# Patient Record
Sex: FEMALE | Race: WHITE | NOT HISPANIC OR LATINO | Employment: UNEMPLOYED | URBAN - METROPOLITAN AREA
[De-identification: names, ages, dates, MRNs, and addresses within clinical notes are randomized per-mention and may not be internally consistent; named-entity substitution may affect disease eponyms.]

---

## 2017-06-14 ENCOUNTER — OFFICE VISIT (OUTPATIENT)
Dept: FAMILY MEDICINE | Facility: CLINIC | Age: 52
End: 2017-06-14

## 2017-06-14 VITALS
DIASTOLIC BLOOD PRESSURE: 102 MMHG | HEART RATE: 75 BPM | BODY MASS INDEX: 37.5 KG/M2 | SYSTOLIC BLOOD PRESSURE: 146 MMHG | WEIGHT: 210 LBS | OXYGEN SATURATION: 97 %

## 2017-06-14 DIAGNOSIS — I10 BENIGN ESSENTIAL HYPERTENSION: ICD-10-CM

## 2017-06-14 DIAGNOSIS — F41.1 GAD (GENERALIZED ANXIETY DISORDER): ICD-10-CM

## 2017-06-14 DIAGNOSIS — F33.1 MODERATE EPISODE OF RECURRENT MAJOR DEPRESSIVE DISORDER (H): Primary | ICD-10-CM

## 2017-06-14 PROCEDURE — 99214 OFFICE O/P EST MOD 30 MIN: CPT | Performed by: FAMILY MEDICINE

## 2017-06-14 RX ORDER — VENLAFAXINE HYDROCHLORIDE 75 MG/1
75 CAPSULE, EXTENDED RELEASE ORAL DAILY
Qty: 30 CAPSULE | Refills: 1 | Status: SHIPPED | OUTPATIENT
Start: 2017-06-14 | End: 2017-08-10

## 2017-06-14 RX ORDER — LISINOPRIL 5 MG/1
5 TABLET ORAL DAILY
Qty: 30 TABLET | Refills: 1 | Status: SHIPPED | OUTPATIENT
Start: 2017-06-14 | End: 2017-06-30

## 2017-06-14 NOTE — PATIENT INSTRUCTIONS
After being on Venlafaxine for 1-2 weeks, then you may add:  Magnesium 400 mg daily  B complex vitamin daily  5- mg daily

## 2017-06-14 NOTE — MR AVS SNAPSHOT
"              After Visit Summary   6/14/2017    Salina Khan    MRN: [id number]           Patient Information     Date Of Birth          1965        Visit Information        Provider Department      6/14/2017 4:00 PM Melody Barnard MD [facility]        Today's Diagnoses     Moderate episode of recurrent major depressive disorder (H)    -  1    JASON (generalized anxiety disorder)        Benign essential hypertension          Care Instructions    After being on Venlafaxine for 1-2 weeks, then you may add:  Magnesium 400 mg daily  B complex vitamin daily  5- mg daily          Follow-ups after your visit        Your next 10 appointments already scheduled     Jun 30, 2017  8:30 AM CDT   PHYSICAL with Melody Barnard MD   [facility]    [address] [place] [address] [phone]              Who to contact     If you have questions or need follow up information about today's clinic visit or your schedule please contact [facility] directly at [phone].  Normal or non-critical lab and imaging results will be communicated to you by MobiKwikhart, letter or phone within 4 business days after the clinic has received the results. If you do not hear from us within 7 days, please contact the clinic through Lumenpulset or phone. If you have a critical or abnormal lab result, we will notify you by phone as soon as possible.  Submit refill requests through PEVESA or call your pharmacy and they will forward the refill request to us. Please allow 3 business days for your refill to be completed.          Additional Information About Your Visit        MobiKwikhart Information     PEVESA lets you send messages to your doctor, view your test results, renew your prescriptions, schedule appointments and more. To sign up, go to [email or web address] . Click on \"Log in\" on the left side of the screen, which will take you to the Welcome " "page. Then click on \"Sign up Now\" on the right side of the page.     You will be asked to enter the access code listed below, as well as some personal information. Please follow the directions to create your username and password.     Your access code is: [email or web address]  Expires: 2017  4:43 PM     Your access code will  in 90 days. If you need help or a new code, please call your [facility] clinic or [phone].        Care EveryWhere ID     This is your Care EveryWhere ID. This could be used by other organizations to access your [facility] medical records  [id number]        Your Vitals Were     Pulse Pulse Oximetry BMI (Body Mass Index)             75 97% 37.5 kg/m2          Blood Pressure from Last 3 Encounters:   17 (!) 140/115   10/27/15 122/82   06/03/15 121/86    Weight from Last 3 Encounters:   17 95.3 kg (210 lb)   10/27/15 87.4 kg (192 lb 9.6 oz)   06/03/15 88.5 kg (195 lb)              Today, you had the following     No orders found for display         Today's Medication Changes          These changes are accurate as of: 17  4:43 PM.  If you have any questions, ask your nurse or doctor.               Start taking these medicines.        Dose/Directions    lisinopril 5 MG tablet   Commonly known as:  PRINIVIL/ZESTRIL   Used for:  Benign essential hypertension   Started by:  Melody Barnard MD        Dose:  5 mg   Take 1 tablet (5 mg) by mouth daily   Quantity:  30 tablet   Refills:  1       venlafaxine 75 MG 24 hr capsule   Commonly known as:  EFFEXOR-XR   Used for:  Moderate episode of recurrent major depressive disorder (H), JASON (generalized anxiety disorder)   Started by:  Melody Barnard MD        Dose:  75 mg   Take 1 capsule (75 mg) by mouth daily   Quantity:  30 capsule   Refills:  1            Where to get your medicines      These medications were sent to [facility] - [place], [place] - [address]  [address] [place] [address]     Phone:  " [phone]     lisinopril 5 MG tablet    venlafaxine 75 MG 24 hr capsule                Primary Care Provider Office Phone # Fax #    Melody Barnard -[phone]       [facility] [address]  [facility] [place][note 2] [address]        Thank you!     Thank you for choosing [facility]  for your care. Our goal is always to provide you with excellent care. Hearing back from our patients is one way we can continue to improve our services. Please take a few minutes to complete the written survey that you may receive in the mail after your visit with us. Thank you!             Your Updated Medication List - Protect others around you: Learn how to safely use, store and throw away your medicines at www.disposemymeds.org.          This list is accurate as of: 6/14/17  4:43 PM.  Always use your most recent med list.                   Brand Name Dispense Instructions for use    calcium citrate 950 MG tablet    CALCITRATE     Take 1 tablet by mouth 3 times daily       cyanocobalamin 1000 MCG tablet    vitamin  B-12     Take 1,000 mcg by mouth daily       IRON SUPPLEMENT PO      Take by mouth daily       lisinopril 5 MG tablet    PRINIVIL/ZESTRIL    30 tablet    Take 1 tablet (5 mg) by mouth daily       MULTI-VITAMIN PO      Take 1 tablet by mouth       valACYclovir 1000 mg tablet    VALTREX    20 tablet    Take 1 tablet (1,000 mg) by mouth 2 times daily       venlafaxine 75 MG 24 hr capsule    EFFEXOR-XR    30 capsule    Take 1 capsule (75 mg) by mouth daily       vitamin D 2000 UNITS Caps      Take by mouth 2 times daily

## 2017-06-14 NOTE — PROGRESS NOTES
Problem(s) Oriented visit        SUBJECTIVE:                                                    Salina Khan is a 51 year old female who presents to clinic today for depression. She has taken Wellbutrin in the past. Right now she feels both depressed and anxious. She has occasional panic attacks. She is working full time as a . Her  recently quit his job and has not yet found another.They are going to marriage counseling, and she is calling to make an appointment with a new individual counselor. Her marriage counselor recommended that she be seen for anxiety and depression. She is not suicidal but feels as though if she were dead the stress would finally be gone. She has no plan for harming herself.      Problem list, Medication list, Allergies, and Medical/Social/Surgical histories reviewed in EPIC and updated as appropriate.   Additional history: as documented    ROS:  5 point ROS completed and negative except noted above, including Gen, CV, Resp, GI, MS  See PHQ and Burns forms for depression and anxiety related symptoms.    Histories:   Patient Active Problem List   Diagnosis     Gestational diabetes     H/O: depression     Health Care Home     Past Surgical History:   Procedure Laterality Date     C/SECTION, CLASSICAL      , Classical     TUBAL LIGATION         Social History   Substance Use Topics     Smoking status: Never Smoker     Smokeless tobacco: Never Used     Alcohol use 3.0 oz/week     5 Standard drinks or equivalent per week     Family History   Problem Relation Age of Onset     Congenital Anomalies Father      cystic fibrosis  age 70's     Hypertension Mother      DIABETES Father            OBJECTIVE:                                                    BP (!) 146/102  Pulse 75  Wt 95.3 kg (210 lb)  SpO2 97%  BMI 37.5 kg/m2  Body mass index is 37.5 kg/(m^2).   GENERAL APPEARANCE: Alert, no acute distress  NEURO: Alert, oriented, speech and mentation normal  PSYCH:  mentation appears normal, affect and mood normal   Labs Resulted Today:   Results for orders placed or performed in visit on 10/27/15   Pap IG HPV hr and lr ([facility])   Result Value Ref Range    DIAGNOSIS: Comment     Specimen adequacy: Comment     Clinician Provided ICD10 Comment     Performed by: Comment     QC reviewed by: Comment     . .     Note: Comment     Test Methodology: Comment     HPV High Risk Negative Negative    HPV, low-risk Negative Negative    Narrative    [facility]formed at:  [facility] - [facility]  [address] [place], [place]  [id number]  : Shama Scott MD, Phone:  [phone]  Performed at:  02 - [facility] [address] [place], [place]  [id number]  : Shama Scott MD, Phone:  [phone]  Specimen Comment: Source.............Cervical  Specimen Comment: LMP / Prev Treat...[id number];None  Specimen Comment: No. of containers..01 CYTYC Thin Prep Vial   Comp. Metabolic Panel (14) ([facility])   Result Value Ref Range    Glucose 92 65 - 99 mg/dL    Urea Nitrogen 13 6 - 24 mg/dL    Creatinine 0.65 0.57 - 1.00 mg/dL    eGFR If NonAfricn Am 103 >59 mL/min/1.73    eGFR If Africn Am 119 >59 mL/min/1.73    BUN/Creatinine Ratio 20 9 - 23    Sodium 140 134 - 144 mmol/L    Potassium 4.5 3.5 - 5.2 mmol/L    Chloride 98 97 - 108 mmol/L    Total CO2 28 18 - 28 mmol/L    Calcium 8.6 (L) 8.7 - 10.2 mg/dL    Protein Total 6.3 6.0 - 8.5 g/dL    Albumin 3.7 3.5 - 5.5 g/dL    Globulin, Total 2.6 1.5 - 4.5 g/dL    A/G Ratio 1.4 1.1 - 2.5    Bilirubin Total 0.4 0.0 - 1.2 mg/dL    Alkaline Phosphatase 89 39 - 117 IU/L    AST 18 0 - 40 IU/L    ALT 16 0 - 32 IU/L    Narrative    Performed at:  01 - [facility] [address], [place], [place]  [id number]  : Tomas Edmonds MD, Phone:  [phone]   Lipid Panel ([facility])   Result Value Ref Range    Cholesterol 149 100 - 199 mg/dL    Triglycerides 91 0 - 149 mg/dL    HDL Cholesterol 70 >39 mg/dL    VLDL  Cholesterol Drew 18 5 - 40 mg/dL    LDL Cholesterol Calculated 61 0 - 99 mg/dL    LDL/HDL Ratio 0.9 0.0 - 3.2 ratio units    Narrative    Performed at:  01 - [facility] [address], [place], [place]  [id number]  : Tomas Edmonds MD, Phone:  [phone]   CBC with Diff/Plt ([facility])   Result Value Ref Range    WBC x10/cmm 3.6 (A) 3.8 - 11.0 x10/cmm    % Lymphocytes 40.0 20.5 - 51.1 %    % Monocytes 7.2 1.7 - 9.3 %    % Granulocytes 52.8 42.2 - 75.2 %    RBC x10/cmm 4.13 3.7 - 5.2 x10/cmm    Hemoglobin 12.6 11.8 - 15.5 g/dl    Hematocrit 37.6 35 - 46 %    MCV 91.0 80 - 100 fL    MCH 30.5 27.0 - 31.0 pg    MCHC 33.5 33.0 - 37.0 g/dL    Platelet Count 205 140 - 450 K/uL     ASSESSMENT/PLAN:                                                        Salina was seen today for depression.    Diagnoses and all orders for this visit:    Moderate episode of recurrent major depressive disorder (H)  -     venlafaxine (EFFEXOR-XR) 75 MG 24 hr capsule; Take 1 capsule (75 mg) by mouth daily  Recheck in 3-4 weeks.    JASON (generalized anxiety disorder)  -     venlafaxine (EFFEXOR-XR) 75 MG 24 hr capsule; Take 1 capsule (75 mg) by mouth daily    Benign essential hypertension  -     lisinopril (PRINIVIL/ZESTRIL) 5 MG tablet; Take 1 tablet (5 mg) by mouth daily  She has been on lisinopril in the past prior to losing a significant amount of weight. She is put back on a small amount.  Recheck at her physical in 3 weeks.     Patient Instructions   After being on Venlafaxine for 1-2 weeks, then you may add:  Magnesium 400 mg daily  B complex vitamin daily  5- mg daily    25 minutes face to face time spent with patient, >50% in counseling.    The following health maintenance items are reviewed in Epic and correct as of today:  Health Maintenance   Topic Date Due     HEPATITIS C SCREENING  08/06/1982     MAMMO SCREEN Q2 YR (SYSTEM ASSIGNED)  08/06/2004     COLON CANCER SCREEN (SYSTEM ASSIGNED)  08/06/2014     JASON QUESTIONNAIRE  6 MONTHS  04/27/2016     INFLUENZA VACCINE (SYSTEM ASSIGNED)  09/01/2017     PAP SCREENING Q3 YR (SYSTEM ASSIGNED)  10/27/2018     TETANUS IMMUNIZATION (SYSTEM ASSIGNED)  01/26/2020     LIPID SCREEN Q5 YR FEMALE (SYSTEM ASSIGNED)  10/27/2020       Melody Barnard MD  [facility]  [facility]  [phone]    For any issues my office # is [phone]

## 2017-06-16 ASSESSMENT — PATIENT HEALTH QUESTIONNAIRE - PHQ9: SUM OF ALL RESPONSES TO PHQ QUESTIONS 1-9: 16

## 2017-06-30 ENCOUNTER — OFFICE VISIT (OUTPATIENT)
Dept: FAMILY MEDICINE | Facility: CLINIC | Age: 52
End: 2017-06-30

## 2017-06-30 VITALS
DIASTOLIC BLOOD PRESSURE: 88 MMHG | WEIGHT: 207 LBS | HEIGHT: 63 IN | BODY MASS INDEX: 36.68 KG/M2 | OXYGEN SATURATION: 96 % | SYSTOLIC BLOOD PRESSURE: 138 MMHG | HEART RATE: 77 BPM

## 2017-06-30 DIAGNOSIS — Z12.31 ENCOUNTER FOR SCREENING MAMMOGRAM FOR BREAST CANCER: ICD-10-CM

## 2017-06-30 DIAGNOSIS — Z00.00 ROUTINE GENERAL MEDICAL EXAMINATION AT A HEALTH CARE FACILITY: Primary | ICD-10-CM

## 2017-06-30 DIAGNOSIS — B37.31 VULVAR CANDIDIASIS: ICD-10-CM

## 2017-06-30 DIAGNOSIS — B00.9 HERPES SIMPLEX VIRUS INFECTION: ICD-10-CM

## 2017-06-30 DIAGNOSIS — I10 BENIGN ESSENTIAL HYPERTENSION: ICD-10-CM

## 2017-06-30 DIAGNOSIS — F33.41 RECURRENT MAJOR DEPRESSIVE DISORDER, IN PARTIAL REMISSION (H): ICD-10-CM

## 2017-06-30 DIAGNOSIS — E55.9 VITAMIN D DEFICIENCY: ICD-10-CM

## 2017-06-30 DIAGNOSIS — Z12.11 SPECIAL SCREENING FOR MALIGNANT NEOPLASMS, COLON: ICD-10-CM

## 2017-06-30 DIAGNOSIS — E66.09 NON MORBID OBESITY DUE TO EXCESS CALORIES: ICD-10-CM

## 2017-06-30 DIAGNOSIS — F41.1 GAD (GENERALIZED ANXIETY DISORDER): ICD-10-CM

## 2017-06-30 PROBLEM — E66.01 MORBID OBESITY DUE TO EXCESS CALORIES (H): Status: ACTIVE | Noted: 2017-06-30

## 2017-06-30 PROBLEM — E66.01 MORBID OBESITY DUE TO EXCESS CALORIES (H): Status: RESOLVED | Noted: 2017-06-30 | Resolved: 2017-06-30

## 2017-06-30 PROCEDURE — 36415 COLL VENOUS BLD VENIPUNCTURE: CPT | Performed by: FAMILY MEDICINE

## 2017-06-30 PROCEDURE — 80048 BASIC METABOLIC PNL TOTAL CA: CPT | Mod: 90 | Performed by: FAMILY MEDICINE

## 2017-06-30 PROCEDURE — 80061 LIPID PANEL: CPT | Mod: 90 | Performed by: FAMILY MEDICINE

## 2017-06-30 PROCEDURE — 82306 VITAMIN D 25 HYDROXY: CPT | Mod: 90 | Performed by: FAMILY MEDICINE

## 2017-06-30 PROCEDURE — 99396 PREV VISIT EST AGE 40-64: CPT | Performed by: FAMILY MEDICINE

## 2017-06-30 PROCEDURE — 99213 OFFICE O/P EST LOW 20 MIN: CPT | Mod: 25 | Performed by: FAMILY MEDICINE

## 2017-06-30 RX ORDER — FLUCONAZOLE 150 MG/1
TABLET ORAL
Qty: 2 TABLET | Refills: 0 | Status: SHIPPED | OUTPATIENT
Start: 2017-06-30 | End: 2018-12-05

## 2017-06-30 RX ORDER — LISINOPRIL 10 MG/1
10 TABLET ORAL DAILY
Qty: 90 TABLET | Refills: 3 | Status: SHIPPED | OUTPATIENT
Start: 2017-06-30 | End: 2018-07-18

## 2017-06-30 RX ORDER — ACETAMINOPHEN 500 MG
1000 TABLET ORAL EVERY 6 HOURS PRN
COMMUNITY
End: 2020-10-16

## 2017-06-30 RX ORDER — VALACYCLOVIR HYDROCHLORIDE 1 G/1
1000 TABLET, FILM COATED ORAL 2 TIMES DAILY
Qty: 20 TABLET | Refills: 5 | Status: SHIPPED | OUTPATIENT
Start: 2017-06-30 | End: 2017-09-28

## 2017-06-30 ASSESSMENT — ANXIETY QUESTIONNAIRES
2. NOT BEING ABLE TO STOP OR CONTROL WORRYING: SEVERAL DAYS
7. FEELING AFRAID AS IF SOMETHING AWFUL MIGHT HAPPEN: SEVERAL DAYS
6. BECOMING EASILY ANNOYED OR IRRITABLE: NOT AT ALL
IF YOU CHECKED OFF ANY PROBLEMS ON THIS QUESTIONNAIRE, HOW DIFFICULT HAVE THESE PROBLEMS MADE IT FOR YOU TO DO YOUR WORK, TAKE CARE OF THINGS AT HOME, OR GET ALONG WITH OTHER PEOPLE: SOMEWHAT DIFFICULT
3. WORRYING TOO MUCH ABOUT DIFFERENT THINGS: SEVERAL DAYS
5. BEING SO RESTLESS THAT IT IS HARD TO SIT STILL: NOT AT ALL
GAD7 TOTAL SCORE: 5
1. FEELING NERVOUS, ANXIOUS, OR ON EDGE: SEVERAL DAYS

## 2017-06-30 ASSESSMENT — PATIENT HEALTH QUESTIONNAIRE - PHQ9: 5. POOR APPETITE OR OVEREATING: SEVERAL DAYS

## 2017-06-30 NOTE — MR AVS SNAPSHOT
After Visit Summary   6/30/2017    Salina Khan    MRN: [id number]           Patient Information     Date Of Birth          1965        Visit Information        Provider Department      6/30/2017 8:30 AM Melody Barnard MD [facility]        Today's Diagnoses     Routine general medical examination at a health care facility    -  1    Recurrent major depressive disorder, in partial remission (H)        Benign essential hypertension        Vitamin D deficiency        Non morbid obesity due to excess calories        JASON (generalized anxiety disorder)        Vulvar candidiasis        Special screening for malignant neoplasms, colon        Encounter for screening mammogram for breast cancer          Care Instructions      Preventive Health Recommendations  Female Ages 50 - 64    Yearly exam: See your health care provider every year in order to  o Review health changes.   o Discuss preventive care.    o Review your medicines if your doctor has prescribed any.      Get a Pap test every three years (unless you have an abnormal result and your provider advises testing more often).    If you get Pap tests with HPV test, you only need to test every 5 years, unless you have an abnormal result.     You do not need a Pap test if your uterus was removed (hysterectomy) and you have not had cancer.    You should be tested each year for STDs (sexually transmitted diseases) if you're at risk.     Have a mammogram every 1 to 2 years.    Have a colonoscopy at age 50, or have a yearly FIT test (stool test). These exams screen for colon cancer.      Have a cholesterol test every 5 years, or more often if advised.    Have a diabetes test (fasting glucose) every three years. If you are at risk for diabetes, you should have this test more often.     If you are at risk for osteoporosis (brittle bone disease), think about having a bone density scan (DEXA).    Shots: Get a flu shot each year. Get a  tetanus shot every 10 years.    Nutrition:     Eat at least 5 servings of fruits and vegetables each day.    Eat whole-grain bread, whole-wheat pasta and brown rice instead of white grains and rice.    Talk to your provider about Calcium and Vitamin D.     Lifestyle    Exercise at least 150 minutes a week (30 minutes a day, 5 days a week). This will help you control your weight and prevent disease.    Limit alcohol to one drink per day.    No smoking.     Wear sunscreen to prevent skin cancer.     See your dentist every six months for an exam and cleaning.    See your eye doctor every 1 to 2 years.            Follow-ups after your visit        Additional Services     GASTROENTEROLOGY ADULT REF CONSULT ONLY       Preferred Location: [facility] [phone]      Please be aware that coverage of these services is subject to the terms and limitations of your health insurance plan.  Call member services at your health plan with any benefit or coverage questions.  Any procedures must be performed at a [facility] facility OR coordinated by your clinic's referral office.    Please bring the following with you to your appointment:    (1) Any X-Rays, CTs or MRIs which have been performed.  Contact the facility where they were done to arrange for  prior to your scheduled appointment.    (2) List of current medications   (3) This referral request   (4) Any documents/labs given to you for this referral                  Future tests that were ordered for you today     Open Future Orders        Priority Expected Expires Ordered    MAMMO -  Screening Digital Bilateral ([facility]) Routine  6/30/2018 6/30/2017            Who to contact     If you have questions or need follow up information about today's clinic visit or your schedule please contact [facility] directly at [phone].  Normal or non-critical lab and imaging results will be communicated to you by MyChart, letter or phone within 4 business  "days after the clinic has received the results. If you do not hear from us within 7 days, please contact the clinic through Vision Critical or phone. If you have a critical or abnormal lab result, we will notify you by phone as soon as possible.  Submit refill requests through Vision Critical or call your pharmacy and they will forward the refill request to us. Please allow 3 business days for your refill to be completed.          Additional Information About Your Visit        Vision Critical Information     Vision Critical lets you send messages to your doctor, view your test results, renew your prescriptions, schedule appointments and more. To sign up, go to [email or web address] . Click on \"Log in\" on the left side of the screen, which will take you to the Welcome page. Then click on \"Sign up Now\" on the right side of the page.     You will be asked to enter the access code listed below, as well as some personal information. Please follow the directions to create your username and password.     Your access code is: [email or web address]  Expires: 2017  4:43 PM     Your access code will  in 90 days. If you need help or a new code, please call your [facility] clinic or [phone].        Care EveryWhere ID     This is your Care EveryWhere ID. This could be used by other organizations to access your [facility] medical records  [id number]        Your Vitals Were     Pulse Height Last Period Pulse Oximetry BMI (Body Mass Index)       77 1.588 m (5' 2.5\") 10/09/2015 (Approximate) 96% 37.26 kg/m2        Blood Pressure from Last 3 Encounters:   17 138/88   17 (!) 146/102   10/27/15 122/82    Weight from Last 3 Encounters:   17 93.9 kg (207 lb)   17 95.3 kg (210 lb)   10/27/15 87.4 kg (192 lb 9.6 oz)              We Performed the Following     Basic Metabolic Panel (8) ([facility])     GASTROENTEROLOGY ADULT REF CONSULT ONLY     Lipid Panel ([facility])     Vitamin D  25-Hydroxy ([facility])          Today's Medication Changes        "   These changes are accurate as of: 6/30/17 11:57 AM.  If you have any questions, ask your nurse or doctor.               Start taking these medicines.        Dose/Directions    fluconazole 150 MG tablet   Commonly known as:  DIFLUCAN   Used for:  Vulvar candidiasis   Started by:  Melody Barnard MD        One po weekly as needed   Quantity:  2 tablet   Refills:  0         These medicines have changed or have updated prescriptions.        Dose/Directions    lisinopril 10 MG tablet   Commonly known as:  PRINIVIL/ZESTRIL   This may have changed:    - medication strength  - how much to take   Used for:  Benign essential hypertension   Changed by:  Melody Barnard MD        Dose:  10 mg   Take 1 tablet (10 mg) by mouth daily   Quantity:  90 tablet   Refills:  3            Where to get your medicines      These medications were sent to [facility] [address] [place] [address] - [address] AT [facility] [place]  [address], [facility] [address]     Phone:  [phone]     fluconazole 150 MG tablet    lisinopril 10 MG tablet                Primary Care Provider Office Phone # Fax #    Melody Barnard [phone]       [facility] [address], located within [facility] [address]        Equal Access to Services     FAHAD SOTOMAYOR AH: Hadii pascual ku hadasho Soomaali, waaxda luqadaha, qaybta kaalmada adeegyada, waxay idiin hayaan ambreen sanabria. So [facility] [phone].    ATENCIÓN: Si habla español, tiene a riley disposición servicios gratuitos de asistencia lingüística. Llame al [phone].    We comply with applicable federal civil rights laws and [place] laws. We do not discriminate on the basis of race, color, national origin, age, disability sex, sexual orientation or gender identity.            Thank you!     Thank you for choosing [facility]  for your care. Our goal is always to provide you with excellent care. Hearing back  from our patients is one way we can continue to improve our services. Please take a few minutes to complete the written survey that you may receive in the mail after your visit with us. Thank you!             Your Updated Medication List - Protect others around you: Learn how to safely use, store and throw away your medicines at www.disposemymeds.org.          This list is accurate as of: 6/30/17 11:57 AM.  Always use your most recent med list.                   Brand Name Dispense Instructions for use Diagnosis    acetaminophen 500 MG tablet    TYLENOL     Take 1,000 mg by mouth        calcium citrate 950 MG tablet    CALCITRATE     Take 1 tablet by mouth 3 times daily        cyanocobalamin 1000 MCG tablet    vitamin  B-12     Take 1,000 mcg by mouth daily        fluconazole 150 MG tablet    DIFLUCAN    2 tablet    One po weekly as needed    Vulvar candidiasis       IRON SUPPLEMENT PO      Take by mouth daily        lisinopril 10 MG tablet    PRINIVIL/ZESTRIL    90 tablet    Take 1 tablet (10 mg) by mouth daily    Benign essential hypertension       MULTI-VITAMIN PO      Take 1 tablet by mouth        valACYclovir 1000 mg tablet    VALTREX    20 tablet    Take 1 tablet (1,000 mg) by mouth 2 times daily    Herpes simplex virus infection       venlafaxine 75 MG 24 hr capsule    EFFEXOR-XR    30 capsule    Take 1 capsule (75 mg) by mouth daily    Moderate episode of recurrent major depressive disorder (H), JASON (generalized anxiety disorder)       vitamin D 2000 UNITS Caps      Take by mouth 2 times daily

## 2017-06-30 NOTE — LETTER
[facility] [address] [place]  [address]  Phone: [phone]    July 5, 2017      Salina Khan  [address] [facility] [address]              Dear Salina,    Results are great, please continue all same medications. Renew medications for one year.         Sincerely,     Melody Barnard M.D.    Results for orders placed or performed in visit on 06/30/17   Lipid Panel ([facility])   Result Value Ref Range    Cholesterol 169 100 - 199 mg/dL    Triglycerides 112 0 - 149 mg/dL    HDL Cholesterol 63 >39 mg/dL    VLDL Cholesterol Drew 22 5 - 40 mg/dL    LDL Cholesterol Calculated 84 0 - 99 mg/dL    LDL/HDL Ratio 1.3 0.0 - 3.2 ratio units    Narrative    Performed at:  01 - [facility] [address], [place], [place]  [id number]  : Tomas Edmonds MD, Phone:  [phone]   Basic Metabolic Panel (8) ([facility])   Result Value Ref Range    Glucose 91 65 - 99 mg/dL    Urea Nitrogen 13 6 - 24 mg/dL    Creatinine 0.74 0.57 - 1.00 mg/dL    eGFR If NonAfricn Am 93 >59 mL/min/1.73    eGFR If Africn Am 108 >59 mL/min/1.73    BUN/Creatinine Ratio 18 9 - 23    Sodium 144 134 - 144 mmol/L    Potassium 4.3 3.5 - 5.2 mmol/L    Chloride 100 96 - 106 mmol/L    Total CO2 30 (H) 18 - 28 mmol/L    Calcium 9.1 8.7 - 10.2 mg/dL    Narrative    Performed at:  01 - [facility] [address], [place], [place]  [id number]  : Tomas Edmonds MD, Phone:  [phone]   Vitamin D  25-Hydroxy ([facility])   Result Value Ref Range    Vitamin D,25-Hydroxy 37.0 30.0 - 100.0 ng/mL    Narrative    Performed at:  01 - [facility] [address], [place], [place]  [id number]  : Tomas Edmonds MD, Phone:  [phone]

## 2017-06-30 NOTE — PROGRESS NOTES
SUBJECTIVE:   CC: Salina Khan is an 51 year old woman who presents for preventive health visit.     Healthy Habits:    Do you get at least three servings of calcium containing foods daily (dairy, green leafy vegetables, etc.)? yes    Amount of exercise or daily activities, outside of work: 2 day(s) per week    Problems taking medications regularly No    Medication side effects: Yes , headaches    Have you had an eye exam in the past two years? yes    Do you see a dentist twice per year? yes    Do you have sleep apnea, excessive snoring or daytime drowsiness?no        Hypertension Follow-up      Outpatient blood pressures are not being checked.    Low Salt Diet: low salt    Depression and Anxiety Follow-Up    Status since last visit: Improved since starting medication 2 weeks ago.    Other associated symptoms:None    Complicating factors:     Significant life event: Yes-  Several job changes in the past few years, death of father-in-law     Current substance abuse: None      Recurrent Genital Herpes  Two outbreaks since diagnosis 2 years ago. Has used Valtrex with good effect.      PHQ-9 SCORE 10/27/2015 6/15/2017   Total Score 7 16     JASON-7 SCORE 10/27/2015   Total Score 15       PHQ-9  English  PHQ-9   Any Language  GAD7    Today's PHQ-2 Score:   PHQ-2 ( 1999 Pf[employer]) 6/30/2017 10/27/2015   Q1: Little interest or pleasure in doing things 1 1   Q2: Feeling down, depressed or hopeless 1 1   PHQ-2 Score 2 2       Abuse: Current or Past(Physical, Sexual or Emotional)- No  Do you feel safe in your environment - Yes    Social History   Substance Use Topics     Smoking status: Never Smoker     Smokeless tobacco: Never Used     Alcohol use 3.0 oz/week     5 Standard drinks or equivalent per week     The patient does not drink >3 drinks per day nor >7 drinks per week.    Reviewed orders with patient.  Reviewed health maintenance and updated orders accordingly - Yes  Labs reviewed in EPIC  BP Readings from Last 3  Encounters:   17 138/88   17 (!) 146/102   10/27/15 122/82    Wt Readings from Last 3 Encounters:   17 93.9 kg (207 lb)   17 95.3 kg (210 lb)   10/27/15 87.4 kg (192 lb 9.6 oz)                  Patient Active Problem List   Diagnosis     Gestational diabetes     H/O: depression     Health Care Home     Recurrent major depressive disorder, in partial remission (H)     Benign essential hypertension     Morbid obesity due to excess calories (H)     Past Surgical History:   Procedure Laterality Date     C/SECTION, CLASSICAL      , Classical     TUBAL LIGATION         Social History   Substance Use Topics     Smoking status: Never Smoker     Smokeless tobacco: Never Used     Alcohol use 3.0 oz/week     5 Standard drinks or equivalent per week     Family History   Problem Relation Age of Onset     Congenital Anomalies Father      cystic fibrosis  age 70's     Hypertension Mother      DIABETES Father          Current Outpatient Prescriptions   Medication Sig Dispense Refill     acetaminophen (TYLENOL) 500 MG tablet Take 1,000 mg by mouth       lisinopril (PRINIVIL/ZESTRIL) 10 MG tablet Take 1 tablet (10 mg) by mouth daily 90 tablet 3     venlafaxine (EFFEXOR-XR) 75 MG 24 hr capsule Take 1 capsule (75 mg) by mouth daily 30 capsule 1     valACYclovir (VALTREX) 1000 mg tablet Take 1 tablet (1,000 mg) by mouth 2 times daily 20 tablet 0     Cholecalciferol (VITAMIN D) 2000 UNITS CAPS Take by mouth 2 times daily       cyanocobalamin (VITAMIN  B-12) 1000 MCG tablet Take 1,000 mcg by mouth daily       Ferrous Sulfate (IRON SUPPLEMENT PO) Take by mouth daily       calcium citrate (CALCITRATE) 950 MG tablet Take 1 tablet by mouth 3 times daily       Multiple Vitamin (MULTI-VITAMIN PO) Take 1 tablet by mouth        [DISCONTINUED] lisinopril (PRINIVIL/ZESTRIL) 5 MG tablet Take 1 tablet (5 mg) by mouth daily 30 tablet 1     Allergies   Allergen Reactions     Erythromycin Swelling     Other  reaction(s): Edema  topical EES: eye swelling  Eye drops        Nsaids Other (See Comments)     Other reaction(s): Other - Describe In Comment Field  Patient had bariatric surgery. Should not ever take NSAIDS due to high risk for gastric ulcers.   Avoid because of gastric by pass surgery     Recent Labs   Lab Test  10/27/15   0939  14   1510  13   1456  10/23/12   1108  10/23/12   1106   LDL  61   --    --   99   --    HDL  70   --    --   42   --    TRIG  91   --    --   151*   --    ALT  16   --   17   --   23   CR  0.65  0.80  0.69   --   0.74   POTASSIUM  4.5  3.8  4.3   --   4.3        Patient over age 50, mutual decision to screen reflected in health maintenance.    Pertinent mammograms are reviewed under the imaging tab.  History of abnormal Pap smear: NO - age 30- 65 PAP every 3 years recommended    Reviewed and updated as needed this visit by clinical staff  Tobacco  Allergies  Meds  Problems         Reviewed and updated as needed this visit by Provider  Problems        Past Medical History:   Diagnosis Date     Gestational diabetes       x 1     H/O: depression 3789-6927      Past Surgical History:   Procedure Laterality Date     C/SECTION, CLASSICAL      , Classical     TUBAL LIGATION       Obstetric History       T0      L2     SAB0   TAB0   Ectopic0   Multiple0   Live Births0       # Outcome Date GA Lbr Dontrell/2nd Weight Sex Delivery Anes PTL Lv   2 Para            1 Para                   ROS:  C: NEGATIVE for fever, chills, change in weight  I: NEGATIVE for worrisome rashes, moles or lesions  E: NEGATIVE for vision changes or irritation  ENT: NEGATIVE for ear, mouth and throat problems  R: NEGATIVE for significant cough or SOB  B: NEGATIVE for masses, tenderness or discharge  CV: NEGATIVE for chest pain, palpitations or peripheral edema  GI: NEGATIVE for nausea, abdominal pain, heartburn, or change in bowel habits   menopausal female: complains of itching in the  "labial area, no vaginal discharge  M: NEGATIVE for significant arthralgias or myalgia  N: NEGATIVE for weakness, dizziness or paresthesias  P: NEGATIVE for changes in mood or affect     OBJECTIVE:   /88  Pulse 77  Ht 1.588 m (5' 2.5\")  Wt 93.9 kg (207 lb)  LMP 10/09/2015 (Approximate)  SpO2 96%  BMI 37.26 kg/m2  EXAM:  GENERAL APPEARANCE: healthy, alert and no distress  EYES: Eyes grossly normal to inspection, PERRL and conjunctivae and sclerae normal  HENT: ear canals and TM's normal, nose and mouth without ulcers or lesions, oropharynx clear and oral mucous membranes moist  NECK: no adenopathy, no asymmetry, masses, or scars and thyroid normal to palpation  RESP: lungs clear to auscultation - no rales, rhonchi or wheezes  BREAST: normal without masses, tenderness or nipple discharge and no palpable axillary masses or adenopathy  CV: regular rate and rhythm, normal S1 S2, no S3 or S4, no murmur, click or rub, no peripheral edema and peripheral pulses strong  ABDOMEN: soft, nontender, no hepatosplenomegaly, no masses and bowel sounds normal   (female): normal urethral meatus, vaginal mucosal atrophy noted, normal cervix, adnexae, and uterus without masses. Labia minora and majora are edematous and inflamed, no change to vaginal mucosa, no discharge.  MS: no musculoskeletal defects are noted and gait is age appropriate without ataxia  SKIN: no suspicious lesions or rashes  NEURO: Normal strength and tone, sensory exam grossly normal, mentation intact and speech normal  PSYCH: mentation appears normal and affect normal/bright    ASSESSMENT/PLAN:   Salina was seen today for physical.    Diagnoses and all orders for this visit:    Routine general medical examination at a health care facility    Recurrent major depressive disorder, in partial remission (H)/JASON (generalized anxiety disorder)  Improved on venlafaxine, recheck in 2-4 weeks at which time we should see the full response to the recently started " "medication.    Benign essential hypertension  -     lisinopril (PRINIVIL/ZESTRIL) 10 MG tablet; Take 1 tablet (10 mg) by mouth daily, dose is increased for better control. Recommend exercise, weight loss, and limit salt.  -     Basic Metabolic Panel (8) ([facility])    Vitamin D deficiency  -     Vitamin D  25-Hydroxy ([facility])  Continue Vitamin D supplement.    Non morbid obesity due to excess calories  -     Lipid Panel ([facility])    Vulvar candidiasis  -     fluconazole (DIFLUCAN) 150 MG tablet; One po weekly as needed  Recommend cotton underwear and keeping the area dry.    Special screening for malignant neoplasms, colon  -     GASTROENTEROLOGY ADULT REF CONSULT ONLY    Encounter for screening mammogram for breast cancer  -     MAMMO -  Screening Digital Bilateral (FUTURE/[facility]); Future        COUNSELING:   Reviewed preventive health counseling, as reflected in patient instructions       Regular exercise       Healthy diet/nutrition       Vision screening       Hearing screening       Osteoporosis Prevention/Bone Health       Colon cancer screening         reports that she has never smoked. She has never used smokeless tobacco.    Estimated body mass index is 37.26 kg/(m^2) as calculated from the following:    Height as of this encounter: 1.588 m (5' 2.5\").    Weight as of this encounter: 93.9 kg (207 lb).   Weight management plan: Discussed healthy diet and exercise guidelines and patient will follow up in 12 months in clinic to re-evaluate.    Counseling Resources:  ATP IV Guidelines  Pooled Cohorts Equation Calculator  Breast Cancer Risk Calculator  FRAX Risk Assessment  ICSI Preventive Guidelines  Dietary Guidelines for Americans, 2010  USDA's MyPlate  ASA Prophylaxis  Lung CA Screening    Melody Barnard MD  [facility]  "

## 2017-07-01 LAB
BUN SERPL-MCNC: 13 MG/DL (ref 6–24)
BUN/CREATININE RATIO: 18 (ref 9–23)
CALCIUM SERPL-MCNC: 9.1 MG/DL (ref 8.7–10.2)
CHLORIDE SERPLBLD-SCNC: 100 MMOL/L (ref 96–106)
CHOLEST SERPL-MCNC: 169 MG/DL (ref 100–199)
CREAT SERPL-MCNC: 0.74 MG/DL (ref 0.57–1)
EGFR IF AFRICN AM: 108 ML/MIN/1.73
EGFR IF NONAFRICN AM: 93 ML/MIN/1.73
GLUCOSE SERPL-MCNC: 91 MG/DL (ref 65–99)
HDLC SERPL-MCNC: 63 MG/DL
LDL/HDL RATIO: 1.3 RATIO UNITS (ref 0–3.2)
LDLC SERPL CALC-MCNC: 84 MG/DL (ref 0–99)
POTASSIUM SERPL-SCNC: 4.3 MMOL/L (ref 3.5–5.2)
SODIUM SERPL-SCNC: 144 MMOL/L (ref 134–144)
TOTAL CO2: 30 MMOL/L (ref 18–28)
TRIGL SERPL-MCNC: 112 MG/DL (ref 0–149)
VITAMIN D, 25-HYDROXY: 37 NG/ML (ref 30–100)
VLDLC SERPL CALC-MCNC: 22 MG/DL (ref 5–40)

## 2017-07-01 ASSESSMENT — ANXIETY QUESTIONNAIRES: GAD7 TOTAL SCORE: 5

## 2017-07-01 ASSESSMENT — PATIENT HEALTH QUESTIONNAIRE - PHQ9: SUM OF ALL RESPONSES TO PHQ QUESTIONS 1-9: 7

## 2017-08-10 DIAGNOSIS — F41.1 GAD (GENERALIZED ANXIETY DISORDER): ICD-10-CM

## 2017-08-10 DIAGNOSIS — F33.1 MODERATE EPISODE OF RECURRENT MAJOR DEPRESSIVE DISORDER (H): ICD-10-CM

## 2017-08-10 RX ORDER — VENLAFAXINE HYDROCHLORIDE 75 MG/1
75 CAPSULE, EXTENDED RELEASE ORAL DAILY
Qty: 30 CAPSULE | Refills: 0 | Status: SHIPPED | OUTPATIENT
Start: 2017-08-10 | End: 2017-09-07

## 2017-09-07 ENCOUNTER — TELEPHONE (OUTPATIENT)
Dept: FAMILY MEDICINE | Facility: CLINIC | Age: 52
End: 2017-09-07

## 2017-09-07 DIAGNOSIS — F33.1 MODERATE EPISODE OF RECURRENT MAJOR DEPRESSIVE DISORDER (H): ICD-10-CM

## 2017-09-07 DIAGNOSIS — F41.1 GAD (GENERALIZED ANXIETY DISORDER): ICD-10-CM

## 2017-09-07 RX ORDER — VENLAFAXINE HYDROCHLORIDE 75 MG/1
75 CAPSULE, EXTENDED RELEASE ORAL DAILY
Qty: 30 CAPSULE | Refills: 0 | OUTPATIENT
Start: 2017-09-07

## 2017-09-08 RX ORDER — VENLAFAXINE HYDROCHLORIDE 75 MG/1
75 CAPSULE, EXTENDED RELEASE ORAL DAILY
Qty: 30 CAPSULE | Refills: 0 | Status: SHIPPED | OUTPATIENT
Start: 2017-09-08 | End: 2017-09-28

## 2017-09-28 ENCOUNTER — OFFICE VISIT (OUTPATIENT)
Dept: FAMILY MEDICINE | Facility: CLINIC | Age: 52
End: 2017-09-28

## 2017-09-28 VITALS
WEIGHT: 216 LBS | BODY MASS INDEX: 38.88 KG/M2 | RESPIRATION RATE: 16 BRPM | OXYGEN SATURATION: 97 % | DIASTOLIC BLOOD PRESSURE: 84 MMHG | HEART RATE: 80 BPM | SYSTOLIC BLOOD PRESSURE: 160 MMHG

## 2017-09-28 DIAGNOSIS — B00.9 HERPES SIMPLEX VIRUS INFECTION: ICD-10-CM

## 2017-09-28 DIAGNOSIS — F41.1 GAD (GENERALIZED ANXIETY DISORDER): ICD-10-CM

## 2017-09-28 DIAGNOSIS — I10 BENIGN ESSENTIAL HYPERTENSION: ICD-10-CM

## 2017-09-28 DIAGNOSIS — F33.42 RECURRENT MAJOR DEPRESSIVE DISORDER, IN FULL REMISSION (H): Primary | ICD-10-CM

## 2017-09-28 DIAGNOSIS — F33.1 MODERATE EPISODE OF RECURRENT MAJOR DEPRESSIVE DISORDER (H): ICD-10-CM

## 2017-09-28 PROBLEM — F33.41 RECURRENT MAJOR DEPRESSIVE DISORDER, IN PARTIAL REMISSION (H): Status: RESOLVED | Noted: 2017-06-30 | Resolved: 2017-09-28

## 2017-09-28 PROCEDURE — 99214 OFFICE O/P EST MOD 30 MIN: CPT | Performed by: FAMILY MEDICINE

## 2017-09-28 RX ORDER — VENLAFAXINE HYDROCHLORIDE 75 MG/1
75 CAPSULE, EXTENDED RELEASE ORAL DAILY
Qty: 90 CAPSULE | Refills: 3 | Status: SHIPPED | OUTPATIENT
Start: 2017-09-28 | End: 2018-10-15

## 2017-09-28 RX ORDER — VALACYCLOVIR HYDROCHLORIDE 1 G/1
1000 TABLET, FILM COATED ORAL 2 TIMES DAILY
Qty: 20 TABLET | Refills: 5 | Status: ON HOLD | OUTPATIENT
Start: 2017-09-28 | End: 2018-06-25

## 2017-09-28 ASSESSMENT — ANXIETY QUESTIONNAIRES
1. FEELING NERVOUS, ANXIOUS, OR ON EDGE: NOT AT ALL
IF YOU CHECKED OFF ANY PROBLEMS ON THIS QUESTIONNAIRE, HOW DIFFICULT HAVE THESE PROBLEMS MADE IT FOR YOU TO DO YOUR WORK, TAKE CARE OF THINGS AT HOME, OR GET ALONG WITH OTHER PEOPLE: NOT DIFFICULT AT ALL
3. WORRYING TOO MUCH ABOUT DIFFERENT THINGS: NOT AT ALL
7. FEELING AFRAID AS IF SOMETHING AWFUL MIGHT HAPPEN: NOT AT ALL
6. BECOMING EASILY ANNOYED OR IRRITABLE: NOT AT ALL
2. NOT BEING ABLE TO STOP OR CONTROL WORRYING: NOT AT ALL
GAD7 TOTAL SCORE: 0
5. BEING SO RESTLESS THAT IT IS HARD TO SIT STILL: NOT AT ALL

## 2017-09-28 ASSESSMENT — PATIENT HEALTH QUESTIONNAIRE - PHQ9
SUM OF ALL RESPONSES TO PHQ QUESTIONS 1-9: 0
5. POOR APPETITE OR OVEREATING: NOT AT ALL

## 2017-09-28 NOTE — PROGRESS NOTES
Problem(s) Oriented visit        SUBJECTIVE:                                                    Salina Khan is a 52 year old female who presents to clinic today for recheck of moods. She notices improvement since being on venlafaxine. She does notice that it makes her sleepy so she does better if she takes it at night. She is very happy to just feel normal. She does not typically check her blood pressure at home, but does have a cuff. No headache. She feels the dose is adequate.       Problem list, Medication list, Allergies, and Medical/Social/Surgical histories reviewed in EPIC and updated as appropriate.   Additional history: as documented    ROS:  5 point ROS completed and negative except noted above, including Gen, CV, Resp, GI, MS  See [place] Anxiety Checklist and PHQ-9 for full details of mood related symptoms.    Histories:   Patient Active Problem List   Diagnosis     Gestational diabetes     Health Care Home     Benign essential hypertension     Non morbid obesity due to excess calories     Recurrent major depressive disorder, in full remission (H)     Past Surgical History:   Procedure Laterality Date     C/SECTION, CLASSICAL      , Classical     TUBAL LIGATION         Social History   Substance Use Topics     Smoking status: Never Smoker     Smokeless tobacco: Never Used     Alcohol use 3.0 oz/week     5 Standard drinks or equivalent per week     Family History   Problem Relation Age of Onset     Congenital Anomalies Father      cystic fibrosis  age 70's     Hypertension Mother      DIABETES Father            OBJECTIVE:                                                    /84  Pulse 80  Resp 16  Wt 98 kg (216 lb)  LMP 10/09/2015 (Approximate)  SpO2 97%  BMI 38.88 kg/m2  Body mass index is 38.88 kg/(m^2).   GENERAL APPEARANCE: Alert, no acute distress  HENT: Ears and TMs normal, oral mucosa and posterior oropharynx normal  NECK: No adenopathy,masses or thyromegaly  RESP: lungs  clear to auscultation   CV: normal rate, regular rhythm, no murmur or gallop  MS: extremities normal, no peripheral edema  NEURO: Alert, oriented, speech and mentation normal  PSYCH: mentation appears normal, affect and mood normal   Labs Resulted Today:   Results for orders placed or performed in visit on 06/30/17   Lipid Panel ([facility])   Result Value Ref Range    Cholesterol 169 100 - 199 mg/dL    Triglycerides 112 0 - 149 mg/dL    HDL Cholesterol 63 >39 mg/dL    VLDL Cholesterol Drew 22 5 - 40 mg/dL    LDL Cholesterol Calculated 84 0 - 99 mg/dL    LDL/HDL Ratio 1.3 0.0 - 3.2 ratio units    Narrative    Performed at:  01 - [facility] [address], [place], [place]  [id number]  : Tomas Edmonds MD, Phone:  [phone]   Basic Metabolic Panel (8) ([facility])   Result Value Ref Range    Glucose 91 65 - 99 mg/dL    Urea Nitrogen 13 6 - 24 mg/dL    Creatinine 0.74 0.57 - 1.00 mg/dL    eGFR If NonAfricn Am 93 >59 mL/min/1.73    eGFR If Africn Am 108 >59 mL/min/1.73    BUN/Creatinine Ratio 18 9 - 23    Sodium 144 134 - 144 mmol/L    Potassium 4.3 3.5 - 5.2 mmol/L    Chloride 100 96 - 106 mmol/L    Total CO2 30 (H) 18 - 28 mmol/L    Calcium 9.1 8.7 - 10.2 mg/dL    Narrative    Performed at:  01 - [facility] [address], [place], [place]  [id number]  : Tomas Edmonds MD, Phone:  [phone]   Vitamin D  25-Hydroxy ([facility])   Result Value Ref Range    Vitamin D,25-Hydroxy 37.0 30.0 - 100.0 ng/mL    Narrative    Performed at:  01 - [facility] [address], [place], [place]  [id number]  : Tomas Edmonds MD, Phone:  [phone]     ASSESSMENT/PLAN:                                                        Salina was seen today for depression.    Diagnoses and all orders for this visit:    Recurrent major depressive disorder, in full remission (H)  -     venlafaxine (EFFEXOR-XR) 75 MG 24 hr capsule; Take 1 capsule (75 mg) by mouth daily  Doing great, continue venlafaxine 57 mg  daily.    JASON (generalized anxiety disorder)  -     venlafaxine (EFFEXOR-XR) 75 MG 24 hr capsule; Take 1 capsule (75 mg) by mouth daily    Benign essential hypertension  She will continue her typical daily dose of lisinopril, but also check her blood pressures over the next few weeks and report the readings to us. Need to make sure the venlafaxine did not cause blood pressure elevation.    Herpes simplex virus infection  -     valACYclovir (VALTREX) 1000 mg tablet; Take 1 tablet (1,000 mg) by mouth 2 times daily      There are no Patient Instructions on file for this visit.    The following health maintenance items are reviewed in Epic and correct as of today:  Health Maintenance   Topic Date Due     HEPATITIS C SCREENING  08/06/1982     MAMMO SCREEN Q2 YR (SYSTEM ASSIGNED)  08/06/2014     COLON CANCER SCREEN (SYSTEM ASSIGNED)  08/06/2014     INFLUENZA VACCINE (SYSTEM ASSIGNED)  09/01/2017     JASON QUESTIONNAIRE 6 MONTHS  12/30/2017     PAP SCREENING Q3 YR (SYSTEM ASSIGNED)  10/27/2018     TETANUS IMMUNIZATION (SYSTEM ASSIGNED)  01/26/2020     LIPID SCREEN Q5 YR FEMALE (SYSTEM ASSIGNED)  06/30/2022       Melody Barnard MD  [facility]  Family Practice  [facility]  [phone]    For any issues my office # is [phone]

## 2017-09-28 NOTE — MR AVS SNAPSHOT
"              After Visit Summary   9/28/2017    Salina Khan    MRN: [id number]           Patient Information     Date Of Birth          1965        Visit Information        Provider Department      9/28/2017 3:30 PM Melody Barnard MD [facility]        Today's Diagnoses     Recurrent major depressive disorder, in full remission (H)    -  1    Moderate episode of recurrent major depressive disorder (H)        JASON (generalized anxiety disorder)        Benign essential hypertension        Herpes simplex virus infection           Follow-ups after your visit        Who to contact     If you have questions or need follow up information about today's clinic visit or your schedule please contact [facility] directly at [phone].  Normal or non-critical lab and imaging results will be communicated to you by [email or web address], letter or phone within 4 business days after the clinic has received the results. If you do not hear from us within 7 days, please contact the clinic through [email or web address] or phone. If you have a critical or abnormal lab result, we will notify you by phone as soon as possible.  Submit refill requests through [facility] or call your pharmacy and they will forward the refill request to us. Please allow 3 business days for your refill to be completed.          Additional Information About Your Visit        MyChart Information     [facility] lets you send messages to your doctor, view your test results, renew your prescriptions, schedule appointments and more. To sign up, go to [email or web address] . Click on \"Log in\" on the left side of the screen, which will take you to the Welcome page. Then click on \"Sign up Now\" on the right side of the page.     You will be asked to enter the access code listed below, as well as some personal information. Please follow the directions to create your username and password.     Your access code is: [email or web address]  Expires: 12/27/2017 11:15 PM   "   Your access code will  in 90 days. If you need help or a new code, please call your [facility] clinic or [phone].        Care EveryWhere ID     This is your Care EveryWhere ID. This could be used by other organizations to access your [facility] medical records  [id number]        Your Vitals Were     Pulse Respirations Last Period Pulse Oximetry BMI (Body Mass Index)       80 16 10/09/2015 (Approximate) 97% 38.88 kg/m2        Blood Pressure from Last 3 Encounters:   17 160/84   17 138/88   17 (!) 146/102    Weight from Last 3 Encounters:   17 98 kg (216 lb)   17 93.9 kg (207 lb)   17 95.3 kg (210 lb)              Today, you had the following     No orders found for display         Where to get your medicines      These medications were sent to [facility] [address] [place] [address] [address] AT [facility] [place]  [address], [facility] [address]     Phone:  [phone]     valACYclovir 1000 mg tablet    venlafaxine 75 MG 24 hr capsule          Primary Care Provider Office Phone # Fax #    Melody Gracie Barnard [phone]       [facility] [address]  [facility] [address]        Equal Access to Services     PAULINE SOTOMAYOR AH: Hadii aad ku hadasho Soomaali, waaxda luqadaha, qaybta kaalmada adeegyada, waxay jack haydonaldo sanabria. So [facility] [phone].    ATENCIÓN: Si habla español, tiene a riley disposición servicios gratuitos de asistencia lingüística. Llame al [phone].    We comply with applicable federal civil rights laws and [place] laws. We do not discriminate on the basis of race, color, national origin, age, disability sex, sexual orientation or gender identity.            Thank you!     Thank you for choosing [facility]  for your care. Our goal is always to provide you with excellent care. Hearing back from our patients is one way we can continue to  improve our services. Please take a few minutes to complete the written survey that you may receive in the mail after your visit with us. Thank you!             Your Updated Medication List - Protect others around you: Learn how to safely use, store and throw away your medicines at www.disposemymeds.org.          This list is accurate as of: 9/28/17 11:15 PM.  Always use your most recent med list.                   Brand Name Dispense Instructions for use Diagnosis    acetaminophen 500 MG tablet    TYLENOL     Take 1,000 mg by mouth        calcium citrate 950 MG tablet    CALCITRATE     Take 1 tablet by mouth 3 times daily        cyanocobalamin 1000 MCG tablet    vitamin  B-12     Take 1,000 mcg by mouth daily        fluconazole 150 MG tablet    DIFLUCAN    2 tablet    One po weekly as needed    Vulvar candidiasis       IRON SUPPLEMENT PO      Take by mouth daily        lisinopril 10 MG tablet    PRINIVIL/ZESTRIL    90 tablet    Take 1 tablet (10 mg) by mouth daily    Benign essential hypertension       MULTI-VITAMIN PO      Take 1 tablet by mouth        valACYclovir 1000 mg tablet    VALTREX    20 tablet    Take 1 tablet (1,000 mg) by mouth 2 times daily    Herpes simplex virus infection       venlafaxine 75 MG 24 hr capsule    EFFEXOR-XR    90 capsule    Take 1 capsule (75 mg) by mouth daily    JASON (generalized anxiety disorder), Recurrent major depressive disorder, in full remission (H)       vitamin D 2000 UNITS Caps      Take by mouth 2 times daily

## 2017-09-29 ASSESSMENT — ANXIETY QUESTIONNAIRES: GAD7 TOTAL SCORE: 0

## 2018-06-25 ENCOUNTER — APPOINTMENT (OUTPATIENT)
Dept: CT IMAGING | Facility: CLINIC | Age: 53
End: 2018-06-25
Attending: EMERGENCY MEDICINE
Payer: COMMERCIAL

## 2018-06-25 ENCOUNTER — HOSPITAL ENCOUNTER (INPATIENT)
Facility: CLINIC | Age: 53
LOS: 1 days | Discharge: HOME | End: 2018-06-26
Attending: EMERGENCY MEDICINE | Admitting: INTERNAL MEDICINE
Payer: COMMERCIAL

## 2018-06-25 DIAGNOSIS — K56.609 SMALL BOWEL OBSTRUCTION (H): ICD-10-CM

## 2018-06-25 LAB
ALBUMIN SERPL-MCNC: 3.5 G/DL (ref 3.4–5)
ALP SERPL-CCNC: 120 U/L (ref 40–150)
ALT SERPL W P-5'-P-CCNC: 24 U/L (ref 0–50)
ANION GAP SERPL CALCULATED.3IONS-SCNC: 14 MMOL/L (ref 3–14)
AST SERPL W P-5'-P-CCNC: 20 U/L (ref 0–45)
BASOPHILS # BLD AUTO: 0 10E9/L (ref 0–0.2)
BASOPHILS NFR BLD AUTO: 0.1 %
BILIRUB SERPL-MCNC: 0.6 MG/DL (ref 0.2–1.3)
BUN SERPL-MCNC: 15 MG/DL (ref 7–30)
CALCIUM SERPL-MCNC: 9.1 MG/DL (ref 8.5–10.1)
CHLORIDE SERPL-SCNC: 107 MMOL/L (ref 94–109)
CO2 SERPL-SCNC: 19 MMOL/L (ref 20–32)
CREAT SERPL-MCNC: 0.62 MG/DL (ref 0.52–1.04)
DIFFERENTIAL METHOD BLD: NORMAL
EOSINOPHIL # BLD AUTO: 0.1 10E9/L (ref 0–0.7)
EOSINOPHIL NFR BLD AUTO: 0.9 %
ERYTHROCYTE [DISTWIDTH] IN BLOOD BY AUTOMATED COUNT: 12.5 % (ref 10–15)
GFR SERPL CREATININE-BSD FRML MDRD: >90 ML/MIN/1.7M2
GLUCOSE SERPL-MCNC: 140 MG/DL (ref 70–99)
HCT VFR BLD AUTO: 43.1 % (ref 35–47)
HGB BLD-MCNC: 14.9 G/DL (ref 11.7–15.7)
IMM GRANULOCYTES # BLD: 0 10E9/L (ref 0–0.4)
IMM GRANULOCYTES NFR BLD: 0.5 %
LIPASE SERPL-CCNC: 276 U/L (ref 73–393)
LYMPHOCYTES # BLD AUTO: 1.6 10E9/L (ref 0.8–5.3)
LYMPHOCYTES NFR BLD AUTO: 18.5 %
MAGNESIUM SERPL-MCNC: 1.7 MG/DL (ref 1.6–2.3)
MCH RBC QN AUTO: 32.5 PG (ref 26.5–33)
MCHC RBC AUTO-ENTMCNC: 34.6 G/DL (ref 31.5–36.5)
MCV RBC AUTO: 94 FL (ref 78–100)
MONOCYTES # BLD AUTO: 0.6 10E9/L (ref 0–1.3)
MONOCYTES NFR BLD AUTO: 7.5 %
NEUTROPHILS # BLD AUTO: 6.2 10E9/L (ref 1.6–8.3)
NEUTROPHILS NFR BLD AUTO: 72.5 %
NRBC # BLD AUTO: 0 10*3/UL
NRBC BLD AUTO-RTO: 0 /100
PLATELET # BLD AUTO: 278 10E9/L (ref 150–450)
POTASSIUM SERPL-SCNC: 4 MMOL/L (ref 3.4–5.3)
PROT SERPL-MCNC: 8.2 G/DL (ref 6.8–8.8)
RBC # BLD AUTO: 4.59 10E12/L (ref 3.8–5.2)
SODIUM SERPL-SCNC: 140 MMOL/L (ref 133–144)
WBC # BLD AUTO: 8.5 10E9/L (ref 4–11)

## 2018-06-25 PROCEDURE — 25000125 ZZHC RX 250: Performed by: PHYSICIAN ASSISTANT

## 2018-06-25 PROCEDURE — 25000125 ZZHC RX 250: Performed by: EMERGENCY MEDICINE

## 2018-06-25 PROCEDURE — 99223 1ST HOSP IP/OBS HIGH 75: CPT | Mod: AI | Performed by: INTERNAL MEDICINE

## 2018-06-25 PROCEDURE — 99207 ZZC NON-BILLABLE SERV PER CHARTING: CPT | Performed by: HOSPITALIST

## 2018-06-25 PROCEDURE — 96376 TX/PRO/DX INJ SAME DRUG ADON: CPT

## 2018-06-25 PROCEDURE — 99285 EMERGENCY DEPT VISIT HI MDM: CPT | Mod: 25

## 2018-06-25 PROCEDURE — 96375 TX/PRO/DX INJ NEW DRUG ADDON: CPT

## 2018-06-25 PROCEDURE — 83735 ASSAY OF MAGNESIUM: CPT | Performed by: EMERGENCY MEDICINE

## 2018-06-25 PROCEDURE — 25000128 H RX IP 250 OP 636: Performed by: INTERNAL MEDICINE

## 2018-06-25 PROCEDURE — 25000128 H RX IP 250 OP 636

## 2018-06-25 PROCEDURE — 99221 1ST HOSP IP/OBS SF/LOW 40: CPT | Performed by: SURGERY

## 2018-06-25 PROCEDURE — 85025 COMPLETE CBC W/AUTO DIFF WBC: CPT | Performed by: EMERGENCY MEDICINE

## 2018-06-25 PROCEDURE — 80053 COMPREHEN METABOLIC PANEL: CPT | Performed by: EMERGENCY MEDICINE

## 2018-06-25 PROCEDURE — 25000128 H RX IP 250 OP 636: Performed by: EMERGENCY MEDICINE

## 2018-06-25 PROCEDURE — 96374 THER/PROPH/DIAG INJ IV PUSH: CPT | Mod: 59

## 2018-06-25 PROCEDURE — 12000000 ZZH R&B MED SURG/OB

## 2018-06-25 PROCEDURE — 83690 ASSAY OF LIPASE: CPT | Performed by: EMERGENCY MEDICINE

## 2018-06-25 PROCEDURE — 74177 CT ABD & PELVIS W/CONTRAST: CPT

## 2018-06-25 PROCEDURE — 25000132 ZZH RX MED GY IP 250 OP 250 PS 637: Performed by: INTERNAL MEDICINE

## 2018-06-25 RX ORDER — HYDROMORPHONE HYDROCHLORIDE 1 MG/ML
0.5 INJECTION, SOLUTION INTRAMUSCULAR; INTRAVENOUS; SUBCUTANEOUS
Status: DISCONTINUED | OUTPATIENT
Start: 2018-06-25 | End: 2018-06-25

## 2018-06-25 RX ORDER — VENLAFAXINE HYDROCHLORIDE 75 MG/1
75 CAPSULE, EXTENDED RELEASE ORAL DAILY
Status: DISCONTINUED | OUTPATIENT
Start: 2018-06-25 | End: 2018-06-26 | Stop reason: HOSPADM

## 2018-06-25 RX ORDER — NALOXONE HYDROCHLORIDE 0.4 MG/ML
.1-.4 INJECTION, SOLUTION INTRAMUSCULAR; INTRAVENOUS; SUBCUTANEOUS
Status: DISCONTINUED | OUTPATIENT
Start: 2018-06-25 | End: 2018-06-26 | Stop reason: HOSPADM

## 2018-06-25 RX ORDER — SODIUM CHLORIDE 9 MG/ML
INJECTION, SOLUTION INTRAVENOUS CONTINUOUS
Status: DISCONTINUED | OUTPATIENT
Start: 2018-06-25 | End: 2018-06-25

## 2018-06-25 RX ORDER — QUETIAPINE FUMARATE 25 MG/1
25-50 TABLET, FILM COATED ORAL EVERY 6 HOURS PRN
Status: DISCONTINUED | OUTPATIENT
Start: 2018-06-25 | End: 2018-06-26 | Stop reason: HOSPADM

## 2018-06-25 RX ORDER — HYDROMORPHONE HYDROCHLORIDE 1 MG/ML
.3-.5 INJECTION, SOLUTION INTRAMUSCULAR; INTRAVENOUS; SUBCUTANEOUS
Status: DISCONTINUED | OUTPATIENT
Start: 2018-06-25 | End: 2018-06-26 | Stop reason: HOSPADM

## 2018-06-25 RX ORDER — LISINOPRIL 10 MG/1
10 TABLET ORAL DAILY
Status: DISCONTINUED | OUTPATIENT
Start: 2018-06-25 | End: 2018-06-26 | Stop reason: HOSPADM

## 2018-06-25 RX ORDER — DIAZEPAM 5 MG
10 TABLET ORAL EVERY 30 MIN PRN
Status: DISCONTINUED | OUTPATIENT
Start: 2018-06-25 | End: 2018-06-26 | Stop reason: HOSPADM

## 2018-06-25 RX ORDER — ONDANSETRON 4 MG/1
4 TABLET, ORALLY DISINTEGRATING ORAL EVERY 6 HOURS PRN
Status: DISCONTINUED | OUTPATIENT
Start: 2018-06-25 | End: 2018-06-26 | Stop reason: HOSPADM

## 2018-06-25 RX ORDER — DIAZEPAM 10 MG/2ML
5-10 INJECTION, SOLUTION INTRAMUSCULAR; INTRAVENOUS EVERY 30 MIN PRN
Status: DISCONTINUED | OUTPATIENT
Start: 2018-06-25 | End: 2018-06-26 | Stop reason: HOSPADM

## 2018-06-25 RX ORDER — CLONIDINE HYDROCHLORIDE 0.1 MG/1
0.1 TABLET ORAL 2 TIMES DAILY
Status: DISCONTINUED | OUTPATIENT
Start: 2018-06-25 | End: 2018-06-26 | Stop reason: HOSPADM

## 2018-06-25 RX ORDER — HYDROMORPHONE HYDROCHLORIDE 1 MG/ML
0.2 INJECTION, SOLUTION INTRAMUSCULAR; INTRAVENOUS; SUBCUTANEOUS ONCE
Status: COMPLETED | OUTPATIENT
Start: 2018-06-25 | End: 2018-06-25

## 2018-06-25 RX ORDER — HYDROCODONE BITARTRATE AND ACETAMINOPHEN 5; 325 MG/1; MG/1
1-2 TABLET ORAL EVERY 6 HOURS PRN
Status: DISCONTINUED | OUTPATIENT
Start: 2018-06-25 | End: 2018-06-26 | Stop reason: HOSPADM

## 2018-06-25 RX ORDER — PROCHLORPERAZINE 25 MG
25 SUPPOSITORY, RECTAL RECTAL EVERY 12 HOURS PRN
Status: DISCONTINUED | OUTPATIENT
Start: 2018-06-25 | End: 2018-06-26 | Stop reason: HOSPADM

## 2018-06-25 RX ORDER — ATENOLOL 25 MG/1
25 TABLET ORAL DAILY
Status: DISCONTINUED | OUTPATIENT
Start: 2018-06-25 | End: 2018-06-26 | Stop reason: HOSPADM

## 2018-06-25 RX ORDER — PROCHLORPERAZINE MALEATE 10 MG
10 TABLET ORAL EVERY 6 HOURS PRN
Status: DISCONTINUED | OUTPATIENT
Start: 2018-06-25 | End: 2018-06-26 | Stop reason: HOSPADM

## 2018-06-25 RX ORDER — ONDANSETRON 2 MG/ML
4 INJECTION INTRAMUSCULAR; INTRAVENOUS EVERY 6 HOURS PRN
Status: DISCONTINUED | OUTPATIENT
Start: 2018-06-25 | End: 2018-06-26 | Stop reason: HOSPADM

## 2018-06-25 RX ORDER — ONDANSETRON 2 MG/ML
INJECTION INTRAMUSCULAR; INTRAVENOUS
Status: COMPLETED
Start: 2018-06-25 | End: 2018-06-25

## 2018-06-25 RX ORDER — ONDANSETRON 2 MG/ML
4 INJECTION INTRAMUSCULAR; INTRAVENOUS EVERY 30 MIN PRN
Status: COMPLETED | OUTPATIENT
Start: 2018-06-25 | End: 2018-06-25

## 2018-06-25 RX ORDER — IOPAMIDOL 755 MG/ML
113 INJECTION, SOLUTION INTRAVASCULAR ONCE
Status: COMPLETED | OUTPATIENT
Start: 2018-06-25 | End: 2018-06-25

## 2018-06-25 RX ADMIN — IOPAMIDOL 113 ML: 755 INJECTION, SOLUTION INTRAVENOUS at 04:50

## 2018-06-25 RX ADMIN — SODIUM CHLORIDE: 9 INJECTION, SOLUTION INTRAVENOUS at 06:07

## 2018-06-25 RX ADMIN — Medication 0.2 MG: at 05:07

## 2018-06-25 RX ADMIN — LISINOPRIL 10 MG: 10 TABLET ORAL at 20:19

## 2018-06-25 RX ADMIN — ATENOLOL 25 MG: 25 TABLET ORAL at 09:24

## 2018-06-25 RX ADMIN — ONDANSETRON 4 MG: 2 INJECTION INTRAMUSCULAR; INTRAVENOUS at 03:45

## 2018-06-25 RX ADMIN — CLONIDINE HYDROCHLORIDE 0.1 MG: 0.1 TABLET ORAL at 09:24

## 2018-06-25 RX ADMIN — ONDANSETRON 4 MG: 2 INJECTION INTRAMUSCULAR; INTRAVENOUS at 06:17

## 2018-06-25 RX ADMIN — FAMOTIDINE 20 MG: 10 INJECTION INTRAVENOUS at 09:38

## 2018-06-25 RX ADMIN — SODIUM CHLORIDE 1000 ML: 9 INJECTION, SOLUTION INTRAVENOUS at 03:45

## 2018-06-25 RX ADMIN — HYDROCODONE BITARTRATE AND ACETAMINOPHEN 1 TABLET: 5; 325 TABLET ORAL at 22:08

## 2018-06-25 RX ADMIN — VENLAFAXINE HYDROCHLORIDE 75 MG: 75 CAPSULE, EXTENDED RELEASE ORAL at 20:19

## 2018-06-25 RX ADMIN — ONDANSETRON 4 MG: 2 INJECTION INTRAMUSCULAR; INTRAVENOUS at 05:07

## 2018-06-25 RX ADMIN — DIATRIZOATE MEGLUMINE AND DIATRIZOATE SODIUM 90 ML: 660; 100 SOLUTION ORAL; RECTAL at 11:37

## 2018-06-25 RX ADMIN — FAMOTIDINE 20 MG: 10 INJECTION INTRAVENOUS at 20:20

## 2018-06-25 RX ADMIN — HYDROMORPHONE HYDROCHLORIDE 0.5 MG: 1 INJECTION, SOLUTION INTRAMUSCULAR; INTRAVENOUS; SUBCUTANEOUS at 03:46

## 2018-06-25 RX ADMIN — CLONIDINE HYDROCHLORIDE 0.1 MG: 0.1 TABLET ORAL at 20:19

## 2018-06-25 RX ADMIN — HYDROCODONE BITARTRATE AND ACETAMINOPHEN 1 TABLET: 5; 325 TABLET ORAL at 06:55

## 2018-06-25 RX ADMIN — SODIUM CHLORIDE 74 ML: 9 INJECTION, SOLUTION INTRAVENOUS at 04:50

## 2018-06-25 ASSESSMENT — ACTIVITIES OF DAILY LIVING (ADL)
AMBULATION: 0-->INDEPENDENT
TOILETING: 0-->INDEPENDENT
AMBULATION: 0 - INDEPENDENT
SWALLOWING: 0 - SWALLOWS FOODS/LIQUIDS WITHOUT DIFFICULTY
TRANSFERRING: 0-->INDEPENDENT
RETIRED_COMMUNICATION: 0-->UNDERSTANDS/COMMUNICATES WITHOUT DIFFICULTY
COGNITION: 0 - NO COGNITION ISSUES REPORTED
DRESS: 0-->INDEPENDENT
SWALLOWING: 0-->SWALLOWS FOODS/LIQUIDS WITHOUT DIFFICULTY
TOILETING: 0 - INDEPENDENT
COMMUNICATION: 0 - UNDERSTANDS/COMMUNICATES WITHOUT DIFFICULTY
BATHING: 0-->INDEPENDENT
BATHING: 0 - INDEPENDENT
TRANSFERRING: 0 - INDEPENDENT
FALL_HISTORY_WITHIN_LAST_SIX_MONTHS: NO
DRESS: 0 - INDEPENDENT
RETIRED_EATING: 0-->INDEPENDENT
EATING: 0 - INDEPENDENT

## 2018-06-25 NOTE — PLAN OF CARE
Problem: Patient Care Overview  Goal: Plan of Care/Patient Progress Review  Outcome: No Change  Pt arrived from ED at 0600am. A&OX4. VSS except BP elevated. IV infusing. Zofran given for nausea- some relieved. Norco given for pain. Complain of pain on mid right upper abdominal quadrant, tender to palpation, active bowel sound. Up w/ SBA. NPO, except for med. Continue to monitor

## 2018-06-25 NOTE — PHARMACY-ADMISSION MEDICATION HISTORY
Admission medication history interview status for the 6/25/2018  admission is complete. See EPIC admission navigator for prior to admission medications     Medication history source reliability:Good    Actions taken by pharmacist (provider contacted, etc):None     Additional medication history information not noted on PTA med list :None    Medication reconciliation/reorder completed by provider prior to medication history? Yes    Time spent in this activity: 15 minutes    Prior to Admission medications    Medication Sig Last Dose Taking? Auth Provider   acetaminophen (TYLENOL) 500 MG tablet Take 1,000 mg by mouth every 6 hours as needed  6/23/2018 Yes Reported, Patient   calcium citrate (CALCITRATE) 950 MG tablet Take 1 tablet by mouth 3 times daily 6/23/2018 Yes Reported, Patient   CYANOCOBALAMIN SL Place 2,500 mcg under the tongue daily Patient is unsure of dose but 2500 mcg is listed in Care Everywhere 6/23/2018 Yes Unknown, Entered By History   fluconazole (DIFLUCAN) 150 MG tablet One po weekly as needed unknown Yes Melody Barnard MD   Iron-Vitamin C (VITRON-C PO) Take 1 tablet by mouth daily 6/24/2018 at pm Yes Unknown, Entered By History   lisinopril (PRINIVIL/ZESTRIL) 10 MG tablet Take 1 tablet (10 mg) by mouth daily 6/24/2018 at pm Yes Melody Barnard MD   multivitamin  peds with C and FA (FLINTSTONES/MY FIRST) CHEW Take 1 tablet by mouth 2 times daily 6/23/2018 Yes Unknown, Entered By History   VALACYCLOVIR HCL PO Take 1,000 mg by mouth 2 times daily as needed unknown Yes Unknown, Entered By History   venlafaxine (EFFEXOR-XR) 75 MG 24 hr capsule Take 1 capsule (75 mg) by mouth daily 6/24/2018 at pm Yes Melody Barnard MD   VITAMIN D, CHOLECALCIFEROL, PO Take 1,000 Units by mouth 2 times daily 6/23/2018 Yes Unknown, Entered By History

## 2018-06-25 NOTE — IP AVS SNAPSHOT
[address]    [facility] [address]    Phone:  [phone]                                       After Visit Summary   6/25/2018    Salina Khan    MRN: [id number]           After Visit Summary Signature Page     I have received my discharge instructions, and my questions have been answered. I have discussed any challenges I see with this plan with the nurse or doctor.    ..........................................................................................................................................  Patient/Patient Representative Signature      ..........................................................................................................................................  Patient Representative Print Name and Relationship to Patient    ..................................................               ................................................  Date                                            Time    ..........................................................................................................................................  Reviewed by Signature/Title    ...................................................              ..............................................  Date                                                            Time

## 2018-06-25 NOTE — CONSULTS
General Surgery Consultation    Salina Khan MRN#: [id number]   Age: 52 year old YOB: 1965     The surgery service by consulted by Dr. Chavez to evaluate and/or treat this patient for possible SBO.    Date of Admission:          2018  Surgeon:      Dr. Arenas          Chief Complaint:   Abdominal pain         History of Present Illness:   This patient is a 52 year old female who presented to the [facility] ER with abdominal pain and associated body aches, nausea and diarrhea x 1 day.  She denies fever, chills, vomiting, change in urination.   She underwent a gastric bypass and cholecystectomy in  at [facility].  She reports daily alcohol use. The history is obtained from the patient and chart.     Overall, feeling better today. She denies flatus.          Past Medical History:   Post Gastric bypass, HTN, alcohol dependence, Vit d deficiency, Anxiety, Depressive disorder, Gestational diabetes.          Past Surgical History:   Gastric bypass with cholecystectomy  Past Surgical History:   Procedure Laterality Date     C/SECTION, CLASSICAL      , Classical     TUBAL LIGATION            Medications:     Prior to Admission medications    Medication Sig Start Date End Date Taking? Authorizing Provider   acetaminophen (TYLENOL) 500 MG tablet Take 1,000 mg by mouth every 6 hours as needed    Yes Reported, Patient   calcium citrate (CALCITRATE) 950 MG tablet Take 1 tablet by mouth 3 times daily   Yes Reported, Patient   CYANOCOBALAMIN SL Place 2,500 mcg under the tongue daily Patient is unsure of dose but 2500 mcg is listed in Care Everywhere   Yes Unknown, Entered By History   fluconazole (DIFLUCAN) 150 MG tablet One po weekly as needed 17  Yes Melody Barnard MD   Iron-Vitamin C (VITRON-C PO) Take 1 tablet by mouth daily   Yes Unknown, Entered By History   lisinopril (PRINIVIL/ZESTRIL) 10 MG tablet Take 1 tablet (10 mg) by mouth daily 17  Yes Melody Barnard  "MD Gracie   multivitamin  peds with C and FA (FLINTSTONES/MY FIRST) CHEW Take 1 tablet by mouth 2 times daily   Yes Unknown, Entered By History   VALACYCLOVIR HCL PO Take 1,000 mg by mouth 2 times daily as needed   Yes Unknown, Entered By History   venlafaxine (EFFEXOR-XR) 75 MG 24 hr capsule Take 1 capsule (75 mg) by mouth daily 9/28/17  Yes Melody Barnard MD   VITAMIN D, CHOLECALCIFEROL, PO Take 1,000 Units by mouth 2 times daily   Yes Unknown, Entered By History            Allergies:     Allergies   Allergen Reactions     Erythromycin Swelling     Other reaction(s): Edema  topical EES: eye swelling  Eye drops        Nsaids Other (See Comments)     Other reaction(s): Other - Describe In Comment Field  Patient had bariatric surgery. Should not ever take NSAIDS due to high risk for gastric ulcers.   Avoid because of gastric by pass surgery            Social History:     Social History   Substance Use Topics     Smoking status: Never Smoker     Smokeless tobacco: Never Used     Alcohol use 3.0 oz/week     5 Standard drinks or equivalent per week             Family History:   POSITIVE for congenital anomalies, diabetes, and HTN.             Review of Systems:   Brief ROS is negative other than noted in the HPI.  C: NEGATIVE for fever, chills, change in weight  R: NEGATIVE for significant cough or SOB  CV: NEGATIVE for chest pain, palpitations or peripheral edema  GI: POSITIVE for abdominal pain, nausea, diarrhea; NEGATIVE for vomiting, heartburn  H: NEGATIVE for bleeding problems         Physical Exam:   Blood pressure 142/87, pulse 96, temperature 98.4  F (36.9  C), temperature source Axillary, resp. rate 16, height 5' 2\" (1.575 m), weight 221 lb (100.2 kg), last menstrual period 10/09/2015, SpO2 94 %, not currently breastfeeding.       General - This is a well developed, well nourished female in no apparent distress.  HEENT - Normocephalic. Atraumatic. Moist mucous membranes. Pupils equal.  No scleral " icterus.  Neck - Supple without masses.  Lungs - Clear to ascultation bilaterally without crackles or wheezing.    Heart - Regular rate & rhythm without murmur.  Abdomen - Soft, ttp RLQ, nondistended with +bowel sounds, no masses or organomegaly.  Extremities - Moves all extremities. Warm without edema.  Neurologic - Nonfocal.          Data:   Labs:  Recent Labs   Lab Test  06/25/18   0343  10/27/15   0937  02/21/14   1425   WBC  8.5  3.6*  5.6   HGB  14.9  12.6  12.9   HCT  43.1  37.6  37.9   PLT  278  205  260     Recent Labs   Lab Test  06/25/18   0343  06/30/17   0931  10/27/15   0939   POTASSIUM  4.0  4.3  4.5   CHLORIDE  107  100  98   CO2  19*   --    --    BUN  15  13  18  13  20   CR  0.62  0.74  0.65     Recent Labs   Lab Test  06/25/18   0343  10/27/15   0939  08/16/13   1456   BILITOTAL  0.6  0.4  0.4   ALT  24  16  17   AST  20  18  14   ALKPHOS  120  89  94   LIPASE  276   --    --    CT scan of the abdomen:   1. Mechanical small bowel obstruction. Moderate fecal material impacted in the distal ileum. This could be secondary to the obstruction or the cause of obstruction.  2. Slight wall thickening/edema of the distal ileum.  3. Small amount of ascites.         Assessment:   SBO         Plan:   - NPO, IVF, pain control and anti-emetics  - CT reviewed with Dr. Arenas  - Gastrografin challenge, if BM hold on XR tomorrow am  - Pepcid    Thank you very much for this consult.     I have discussed the history, physical, and plan with Dr. Arenas and who has independently interviewed and examined the patient and agrees with the plan as stated.      JOSELUIS GarciaC  [facility]  [phone]

## 2018-06-25 NOTE — ED PROVIDER NOTES
"  History     Chief Complaint:  Abdominal pain    HPI   Salina Khan is a 52 year old female who presents with abdominal pain.  Patient had an episode of diarrhea earlier today.  Ate dinner which was pizza and after this developed mid abdominal pain.  Does feel nauseous but no vomiting.  No urinary symptoms or fever. No chest pain.    Allergies:  Erythromycin  Nsaids     Medications:      lisinopril (PRINIVIL/ZESTRIL) 10 MG tablet   venlafaxine (EFFEXOR-XR) 75 MG 24 hr capsule   acetaminophen (TYLENOL) 500 MG tablet   calcium citrate (CALCITRATE) 950 MG tablet   Cholecalciferol (VITAMIN D) 2000 UNITS CAPS   cyanocobalamin (VITAMIN  B-12) 1000 MCG tablet   Ferrous Sulfate (IRON SUPPLEMENT PO)   fluconazole (DIFLUCAN) 150 MG tablet   Multiple Vitamin (MULTI-VITAMIN PO)   valACYclovir (VALTREX) 1000 mg tablet       Past Medical History:    Past Medical History:   Diagnosis Date     Anxiety      Depressive disorder      Gestational diabetes      H/O: depression [id number]     Past Surgical History:    Past Surgical History:   Procedure Laterality Date     C/SECTION, CLASSICAL      , Classical     TUBAL LIGATION          Family History:    family history includes Congenital Anomalies in her father; Diabetes in her father; Hypertension in her mother.    Social History:   reports that she has never smoked. She has never used smokeless tobacco. She reports that she drinks about 3.0 oz of alcohol per week  She reports that she does not use illicit drugs.    PCP: Melody Barnard     Review of Systems  10 point review of systems was obtained and negative other than mentioned above.      Physical Exam   Patient Vitals for the past 24 hrs:   BP Temp Temp src Pulse Resp SpO2 Height Weight   18 0330 (!) 167/109 98.3  F (36.8  C) Oral 105 16 98 % - -   18 0327 - - - - - - 1.575 m (5' 2\") 100.2 kg (221 lb)        Physical Exam  General: Appears uncomfortable  Eyes:  The pupils are equal and " round    Conjunctivae and sclerae are normal  ENT:    Dry mucous membranes  Neck:  Normal range of motion  CV:  Tachycardic rate and rhythm    Skin warm and well perfused   Resp:  Lungs are clear    Non-labored    No rales    No wheezing   GI:  Abdomen is soft, there is no rigidity    No distension    No rebound tenderness     Mid abdominal tenderness    No guarding  MS:  Normal muscular tone  Skin:  No rash or acute skin lesions noted  Neuro:   Awake, alert.      Speech is normal and fluent.    Face is symmetric.     Moves all extremities  Psych: Normal affect.  Appropriate interactions.    Emergency Department Course     Imaging:  CT Abdomen Pelvis w Contrast   Final Result   IMPRESSION:   1. Mechanical small bowel obstruction. Moderate fecal material   impacted in the distal ileum. This could be secondary to the   obstruction or the cause of obstruction.   2. Slight wall thickening/edema of the distal ileum.   3. Small amount of ascites.      TERESA DIXON MD         All imaging results were discussed with the patient who voiced understanding of the findings.    Laboratory:  CBC wnl  Lipase wnl  CMP Na 140, K 4.0, CO 19, Glucose 140, Cr 0.62    Interventions:    Medications   ondansetron (ZOFRAN) injection 4 mg (4 mg Intravenous Given 6/25/18 0507)   0.9% sodium chloride BOLUS (1,000 mLs Intravenous New Bag 6/25/18 0345)   iopamidol (ISOVUE-370) solution 113 mL (113 mLs Intravenous Given 6/25/18 0450)   CT scan flush (74 mLs Intravenous Given 6/25/18 0450)   HYDROmorphone (PF) (DILAUDID) injection 0.2 mg (0.2 mg Intravenous Given 6/25/18 0507)      Emergency Department Course:  Past medical records, nursing notes, and vitals reviewed.  I performed an exam of the patient and obtained history, as documented above.    0500: Patient feeling slightly better though still having pain and nausea. Did become mildly hypoxic after dilaudid    0515: I rechecked the patient. Findings and plan explained to the Patient.  Patient feeling better  0525: Spoke with Dr. Chavez for admission    Impression & Plan      Medical Decision Making:  Salina Khan is a 52-year-old female who presented emergency department with abdominal pain.  Mildly tachycardic.  Has mid abdominal tenderness on exam.  Symptoms treated with antiemetics, fluids and pain medication.  Laboratory values obtained as well as CT abdomen. Results show small bowel obstruction. Symptoms well controlled in ED so held off on NG tube. Discussed with hospitalist Dr. Chavez for admission. Will likely obtain surgical consult in morning, no peritoneal signs on abdomen - does not require surgical consult emergently.    Diagnosis:    ICD-10-CM    1. Small bowel obstruction K56.609       6/25/2018   Clary Oliveira MD Goertz, Maria Kristine, MD  06/25/18 0527

## 2018-06-25 NOTE — ED NOTES
[facility]  ED Nurse Handoff Report    ED Chief complaint: Abdominal Pain (abd pain with nausea and diarrhea this am, hx gastric bypass.  3 alcoholic drinks per night)      ED Diagnosis:   Final diagnoses:   None       Code Status: Full Code    Allergies:   Allergies   Allergen Reactions     Erythromycin Swelling     Other reaction(s): Edema  topical EES: eye swelling  Eye drops        Nsaids Other (See Comments)     Other reaction(s): Other - Describe In Comment Field  Patient had bariatric surgery. Should not ever take NSAIDS due to high risk for gastric ulcers.   Avoid because of gastric by pass surgery       Activity level - Baseline/Home:  Independent    Activity Level - Current:   Stand with Assist     Needed?: No    Isolation: No  Infection: Not Applicable  Bariatric?: No    Vital Signs:   Vitals:    06/25/18 0400 06/25/18 0415 06/25/18 0430 06/25/18 0500   BP: (!) 168/104 (!) 162/108 (!) 173/102 (!) 169/105   Pulse:       Resp:       Temp:       TempSrc:       SpO2:  98% 96% 98%   Weight:       Height:           Cardiac Rhythm:    Pain level: 0-10 Pain Scale: 6    Is this patient confused?: No   [place] - Suicide Severity Rating Scale Completed?  Yes  If yes, what color did the patient score?  White    Patient Report: Initial Complaint: Patient present to ED tonight after being woke up approx 9:30 p.m.; abdominal pain and nausea.  + dry heaving.  Patient reported feeling fine prior to eating pizza at 8:00p.m.  Patient initially present to ED with a constant burning pain horizontal at umbilicus however reports pain is now more of a dull ache localized to umbilical area.  H/o gastric bypass surgery 2015.  Focused Assessment:   NEURO:  WDL  CARDS:  WDL  RESP:  Requiring oxygen at 2L NC after Dilaudid  GI/:  Nausea controlled with Zofran.  Mid abdominal tenderness.  No abdominal distention noted.  ED provider denies need for NG unless patient's nausea is not controlled with Zofran  or abdominal distention is noted.  SKIN:  WDL  Tests Performed: Dilaudid, Zofran  Abnormal Results:   Labs Ordered and Resulted from Time of ED Arrival Up to the Time of Departure from the ED   COMPREHENSIVE METABOLIC PANEL - Abnormal; Notable for the following:        Result Value    Carbon Dioxide 19 (*)     Glucose 140 (*)     All other components within normal limits   CBC WITH PLATELETS DIFFERENTIAL   LIPASE     Radiology Results:  IMPRESSION:  1. Mechanical small bowel obstruction. Moderate fecal material  impacted in the distal ileum. This could be secondary to the  obstruction or the cause of obstruction.  2. Slight wall thickening/edema of the distal ileum.  3. Small amount of ascites.    ED Medications:   Medications   ondansetron (ZOFRAN) injection 4 mg (4 mg Intravenous Given 6/25/18 0507)   0.9% sodium chloride BOLUS (1,000 mLs Intravenous New Bag 6/25/18 0345)   iopamidol (ISOVUE-370) solution 113 mL (113 mLs Intravenous Given 6/25/18 0450)   CT scan flush (74 mLs Intravenous Given 6/25/18 0450)   HYDROmorphone (PF) (DILAUDID) injection 0.2 mg (0.2 mg Intravenous Given 6/25/18 0507)     Family Comments: Patient is  with two children still living at home.  Patient works full time.    OBS brochure/video discussed/provided to patient: N/A      Drips infusing?:  No    For the majority of the shift this patient was Green.   Interventions performed were .    Severe Sepsis OR Septic Shock Diagnosis Present: No      ED NURSE PHONE NUMBER: [phone]

## 2018-06-25 NOTE — IP AVS SNAPSHOT
MRN:[id number]                      After Visit Summary   6/25/2018    Salina Khan    MRN: [id number]           Thank you!     Thank you for choosing [facility] for your care. Our goal is always to provide you with excellent care. Hearing back from our patients is one way we can continue to improve our services. Please take a few minutes to complete the written survey that you may receive in the mail after you visit with us. Thank you!        Patient Information     Date Of Birth          1965        Designated Caregiver       Most Recent Value    Caregiver    Will someone help with your care after discharge? yes    Name of designated caregiver JJ    Phone number of caregiver [phone]    Caregiver address [address] [place] [address]      About your hospital stay     You were admitted on:  June 25, 2018 You last received care in the:  [facility] [address] [facility]    You were discharged on:  June 26, 2018        Reason for your hospital stay       Bowel obstruction.                  Who to Call     For medical emergencies, please call 911.  For non-urgent questions about your medical care, please call your primary care provider or clinic, [phone]          Attending Provider     Provider Specialty    Clary Oliveira MD Emergency Medicine    Dewart, Kaitlyn Wang MD Internal Medicine       Primary Care Provider Office Phone # Fax #    Melody Barnard [phone]      After Care Instructions     Activity       Your activity upon discharge: activity as tolerated            Diet       Follow this diet upon discharge: Orders Placed This Encounter      Low Fiber Diet                  Follow-up Appointments     Follow-up and recommended labs and tests        Follow up with primary care provider, Melody Barnard, within 7 days for hospital follow- up.  The following labs/tests are recommended: cbc/bmp.    Follow up with Surgery as directed.       "            Pending Results     No orders found for last 3 day(s).            Statement of Approval     Ordered          18 0955  I have reviewed and agree with all the recommendations and orders detailed in this document.  EFFECTIVE NOW     Approved and electronically signed by:  Isra Sigala DO             Admission Information     Date & Time Provider Department Dept. Phone    2018 Kaitlyn Chavez MD [facility] [address] [facility] [phone]      Your Vitals Were     Blood Pressure Pulse Temperature Respirations Height Weight    145/87 (BP Location: Left arm) 62 97.2  F (36.2  C) (Oral) 16 1.575 m (5' 2\") 100.2 kg (221 lb)    Last Period Pulse Oximetry BMI (Body Mass Index)             10/09/2015 (Approximate) 95% 40.42 kg/m2         MyChart Information     [employer] lets you send messages to your doctor, view your test results, renew your prescriptions, schedule appointments and more. To sign up, go to [email or web address] . Click on \"Log in\" on the left side of the screen, which will take you to the Welcome page. Then click on \"Sign up Now\" on the right side of the page.     You will be asked to enter the access code listed below, as well as some personal information. Please follow the directions to create your username and password.     Your access code is: [email or web address]  Expires: 2018 12:44 PM     Your access code will  in 90 days. If you need help or a new code, please call your [facility] clinic or [phone].        Care EveryWhere ID     This is your Care EveryWhere ID. This could be used by other organizations to access your [facility] medical records  [id number]        Equal Access to Services     [facility] : Hadii pascual Cunningham, walaurada luqadaha, qaybta kaalmada adenataliayada, sandy sanabria. So [facility] [phone].    ATENCIÓN: Si habla español, tiene a riley disposición servicios gratuitos de asistencia lingüística. Llame al " [phone].    We comply with applicable federal civil rights laws and [place] laws. We do not discriminate on the basis of race, color, national origin, age, disability, sex, sexual orientation, or gender identity.               Review of your medicines      CONTINUE these medicines which have NOT CHANGED        Dose / Directions    acetaminophen 500 MG tablet   Commonly known as:  TYLENOL        Dose:  1000 mg   Take 1,000 mg by mouth every 6 hours as needed   Refills:  0       calcium citrate 950 MG tablet   Commonly known as:  CALCITRATE        Dose:  1 tablet   Take 1 tablet by mouth 3 times daily   Refills:  0       CYANOCOBALAMIN SL        Dose:  2500 mcg   Place 2,500 mcg under the tongue daily Patient is unsure of dose but 2500 mcg is listed in Care Everywhere   Refills:  0       fluconazole 150 MG tablet   Commonly known as:  DIFLUCAN   Used for:  Vulvar candidiasis        One po weekly as needed   Quantity:  2 tablet   Refills:  0       lisinopril 10 MG tablet   Commonly known as:  PRINIVIL/ZESTRIL   Used for:  Benign essential hypertension        Dose:  10 mg   Take 1 tablet (10 mg) by mouth daily   Quantity:  90 tablet   Refills:  3       multivitamin  peds with C and FA Chew        Dose:  1 tablet   Take 1 tablet by mouth 2 times daily   Refills:  0       VALACYCLOVIR HCL PO        Dose:  1000 mg   Take 1,000 mg by mouth 2 times daily as needed   Refills:  0       venlafaxine 75 MG 24 hr capsule   Commonly known as:  EFFEXOR-XR   Used for:  JASON (generalized anxiety disorder), Recurrent major depressive disorder, in full remission (H)        Dose:  75 mg   Take 1 capsule (75 mg) by mouth daily   Quantity:  90 capsule   Refills:  3       VITAMIN D (CHOLECALCIFEROL) PO        Dose:  1000 Units   Take 1,000 Units by mouth 2 times daily   Refills:  0       VITRON-C PO        Dose:  1 tablet   Take 1 tablet by mouth daily   Refills:  0                Protect others around you: Learn how to safely use,  store and throw away your medicines at www.disposemymeds.org.        ANTIBIOTIC INSTRUCTION     You've Been Prescribed an Antibiotic - Now What?  Your healthcare team thinks that you or your loved one might have an infection. Some infections can be treated with antibiotics, which are powerful, life-saving drugs. Like all medications, antibiotics have side effects and should only be used when necessary. There are some important things you should know about your antibiotic treatment.      Your healthcare team may run tests before you start taking an antibiotic.    Your team may take samples (e.g., from your blood, urine or other areas) to run tests to look for bacteria. These test can be important to determine if you need an antibiotic at all and, if you do, which antibiotic will work best.      Within a few days, your healthcare team might change or even stop your antibiotic.    Your team may start you on an antibiotic while they are working to find out what is making you sick.    Your team might change your antibiotic because test results show that a different antibiotic would be better to treat your infection.    In some cases, once your team has more information, they learn that you do not need an antibiotic at all. They may find out that you don't have an infection, or that the antibiotic you're taking won't work against your infection. For example, an infection caused by a virus can't be treated with antibiotics. Staying on an antibiotic when you don't need it is more likely to be harmful than helpful.      You may experience side effects from your antibiotic.    Like all medications, antibiotics have side effects. Some of these can be serious.    Let you healthcare team know if you have any known allergies when you are admitted to the hospital.    One significant side effect of nearly all antibiotics is the risk of severe and sometimes deadly diarrhea caused by Clostridium difficile (C. Difficile). This occurs  when a person takes antibiotics because some good germs are destroyed. Antibiotic use allows C. diificile to take over, putting patients at high risk for this serious infection.    As a patient or caregiver, it is important to understand your or your loved one's antibiotic treatment. It is especially important for caregivers to speak up when patients can't speak for themselves. Here are some important questions to ask your healthcare team.    What infection is this antibiotic treating and how do you know I have that infection?    What side effects might occur from this antibiotic?    How long will I need to take this antibiotic?    Is it safe to take this antibiotic with other medications or supplements (e.g., vitamins) that I am taking?     Are there any special directions I need to know about taking this antibiotic? For example, should I take it with food?    How will I be monitored to know whether my infection is responding to the antibiotic?    What tests may help to make sure the right antibiotic is prescribed for me?      Information provided by:  www.cdc.gov/getsmart  U.S. Department of Health and Human Services  Centers for disease Control and Prevention  National Center for Emerging and Zoonotic Infectious Diseases  Division of Healthcare Quality Promotion             Medication List: This is a list of all your medications and when to take them. Check marks below indicate your daily home schedule. Keep this list as a reference.      Medications           Morning Afternoon Evening Bedtime As Needed    acetaminophen 500 MG tablet   Commonly known as:  TYLENOL   Take 1,000 mg by mouth every 6 hours as needed                                calcium citrate 950 MG tablet   Commonly known as:  CALCITRATE   Take 1 tablet by mouth 3 times daily                                CYANOCOBALAMIN SL   Place 2,500 mcg under the tongue daily Patient is unsure of dose but 2500 mcg is listed in Care Everywhere                                 fluconazole 150 MG tablet   Commonly known as:  DIFLUCAN   One po weekly as needed                                lisinopril 10 MG tablet   Commonly known as:  PRINIVIL/ZESTRIL   Take 1 tablet (10 mg) by mouth daily   Last time this was given:  10 mg on 6/25/2018  8:19 PM                                multivitamin  peds with C and FA Chew   Take 1 tablet by mouth 2 times daily                                VALACYCLOVIR HCL PO   Take 1,000 mg by mouth 2 times daily as needed                                venlafaxine 75 MG 24 hr capsule   Commonly known as:  EFFEXOR-XR   Take 1 capsule (75 mg) by mouth daily   Last time this was given:  75 mg on 6/25/2018  8:19 PM                                VITAMIN D (CHOLECALCIFEROL) PO   Take 1,000 Units by mouth 2 times daily                                VITRON-C PO   Take 1 tablet by mouth daily

## 2018-06-25 NOTE — PROGRESS NOTES
RECEIVING UNIT ED HANDOFF REVIEW    ED Nurse Handoff Report was reviewed by: MAHSA PEARSON on June 25, 2018 at 5:36 AM

## 2018-06-25 NOTE — PROGRESS NOTES
"[facility]  Hospitalist Progress Note        Isra Sigala, DO  06/25/2018        Interval History:      Patient states that she is doing much better today.          Assessment and Plan:        Salina Khan is a very pleasant 52 year old female with obesity, s/p gastric bypass surgery, depression, anxiety, alcohol dependence who was admitted on 6/25/2018 for abdominal pain due to SBO.     Small Bowel Obstruction, H/o gastric bypass surgery in 2014 Known abdominal surgery history (c section x 2, gastric bypass) but no SBOs in the past. Fecal impaction present on CT scan but unclear if it is the cause or the effect of the SBO.  Lipase normal.  LFTs normal.  - Surgery following.   - Improved.   - Pain control.      Alcohol dependence Reports 3-5 alcoholic drinks per day for many months. Reported, \"I know I need to stop.\" Likely not helping her depression symptoms and exceeds recommended alcohol intake for post gastric bypass patients. Denies h/o DTs, seizures.   - Monitor for withdrawal with CIWA  - Encouraged cessation of alcohol      Depression, Anxiety Chronic, stable. Takes venlafaxine daily.  - Continue venlafaxine     Hypertension Managed PTA with lisinopril.   - Hold lisinopril for now.     Pruritus No recent medication changes.  No elevation in bilirubin.  Could represent a contact dermatitis.  - Trial topical Sarna lotion     DVT prophylaxis: Pneumatic Compression Devices     Code: FULL     Disposition: Expected discharge in 1-2 days pending resolution of obstruction symptoms.     Pain: # Pain Assessment:  Current Pain Score 6/25/2018   Pain score (0-10) 6   - Salina is experiencing pain. Pain management was discussed and the plan was created in a collaborative fashion.  Salina's response to the current recommendations: mixed response  - Please see the plan for pain management as documented above                   Physical Exam:           Blood pressure 142/87, pulse 96, temperature " "98.4  F (36.9  C), temperature source Axillary, resp. rate 16, height 1.575 m (5' 2\"), weight 100.2 kg (221 lb), last menstrual period 10/09/2015, SpO2 94 %, not currently breastfeeding.    Vitals:    18 0327   Weight: 100.2 kg (221 lb)       Vital Sign Ranges  Temperature Temp  Av  F (36.7  C)  Min: 97.2  F (36.2  C)  Max: 98.4  F (36.9  C)   Blood pressure Systolic (24hrs), Av , Min:142 , Max:173        Diastolic (24hrs), Av, Min:87, Max:109      Pulse Pulse  Av.7  Min: 95  Max: 105   Respirations Resp  Av  Min: 16  Max: 16   Pulse oximetry SpO2  Av.4 %  Min: 92 %  Max: 99 %     Vital Signs with Ranges  Temp:  [97.2  F (36.2  C)-98.4  F (36.9  C)] 98.4  F (36.9  C)  Pulse:  [] 96  Resp:  [16] 16  BP: (142-173)/() 142/87  SpO2:  [92 %-99 %] 94 %    I/O Last 3 Shifts:        I/O past 24 hours:   No intake or output data in the 24 hours ending 18 0849    GENERAL: Alert and oriented. NAD. Conversational, appropriate.   HEENT: Normocephalic. EOMI. No icterus or injection. Nares normal.   LUNGS: Clear to auscultation. No dyspnea at rest.   HEART: Regular rate. Extremities perfused.   ABDOMEN: Soft, nontender, and nondistended. Quiet bowel sounds.   EXTREMITIES: No LE edema noted.   NEUROLOGIC: Moves extremities x4 on command. No acute focal neurologic abnormalities noted.          Prior to Admission Medications:        Prescriptions Prior to Admission   Medication Sig Dispense Refill Last Dose     acetaminophen (TYLENOL) 500 MG tablet Take 1,000 mg by mouth every 6 hours as needed    2018     calcium citrate (CALCITRATE) 950 MG tablet Take 1 tablet by mouth 3 times daily   2018     CYANOCOBALAMIN SL Place 2,500 mcg under the tongue daily Patient is unsure of dose.    2018     fluconazole (DIFLUCAN) 150 MG tablet One po weekly as needed 2 tablet 0 unknown     Iron-Vitamin C (VITRON-C PO) Take 1 tablet by mouth daily   2018 at pm     lisinopril " (PRINIVIL/ZESTRIL) 10 MG tablet Take 1 tablet (10 mg) by mouth daily 90 tablet 3 6/24/2018 at pm     multivitamin  peds with C and FA (FLINTSTONES/MY FIRST) CHEW Take 1 tablet by mouth 2 times daily   6/23/2018     VALACYCLOVIR HCL PO Take 1,000 mg by mouth 2 times daily as needed   unknown     venlafaxine (EFFEXOR-XR) 75 MG 24 hr capsule Take 1 capsule (75 mg) by mouth daily 90 capsule 3 6/24/2018 at pm     VITAMIN D, CHOLECALCIFEROL, PO Take 1,000 Units by mouth 2 times daily   6/23/2018            Medications:        Current Facility-Administered Medications   Medication Last Rate     sodium chloride 100 mL/hr at 06/25/18 0607     Current Facility-Administered Medications   Medication Dose Route Frequency     atenolol  25 mg Oral Daily     cloNIDine  0.1 mg Oral BID     lisinopril  10 mg Oral Daily     venlafaxine  75 mg Oral Daily     Current Facility-Administered Medications   Medication Dose Route Frequency     camphor-menthol   Topical Q6H PRN     diazepam  10 mg Oral Q30 Min PRN    Or     diazepam  5-10 mg Intravenous Q30 Min PRN     HYDROcodone-acetaminophen  1-2 tablet Oral Q6H PRN     HYDROmorphone  0.3-0.5 mg Intravenous Q2H PRN     melatonin  1 mg Oral At Bedtime PRN     naloxone  0.1-0.4 mg Intravenous Q2 Min PRN     ondansetron  4 mg Oral Q6H PRN    Or     ondansetron  4 mg Intravenous Q6H PRN     prochlorperazine  10 mg Intravenous Q6H PRN    Or     prochlorperazine  10 mg Oral Q6H PRN    Or     prochlorperazine  25 mg Rectal Q12H PRN     QUEtiapine  25-50 mg Oral Q6H PRN            Data:      Lab data reviewed.     Recent Labs  Lab 06/25/18  0343   HGB 14.9   MCV 94         POTASSIUM 4.0   CHLORIDE 107   CO2 19*   BUN 15   CR 0.62   ANIONGAP 14   ROSAURA 9.1   *           Imaging:      Imaging data reviewed.     Dr. Isra Sigala D.O.  [facility]  Pager [phone]

## 2018-06-25 NOTE — PLAN OF CARE
Problem: Bowel Obstruction (Adult)  Goal: Signs and Symptoms of Listed Potential Problems Will be Absent, Minimized or Managed (Bowel Obstruction)  Signs and symptoms of listed potential problems will be absent, minimized or managed by discharge/transition of care (reference Bowel Obstruction (Adult) CPG).  Outcome: Improving  ED admit @ 0600 today.  VSS, continuous pulse ox.  A&Ox4.  Up with SBA.  NPO, only ice chips and meds.  CIWA negative.  C/o mild abd pain this AM, relieved with prn [place].  2 BM this shift.  Denies nausea.  Room air.  Plan for gastrografin challenge today, however per surgery note, if BM then hold XR til tomorrow AM.  No orders, PA paged.  Will continue to monitor.

## 2018-06-26 VITALS
HEART RATE: 62 BPM | SYSTOLIC BLOOD PRESSURE: 145 MMHG | DIASTOLIC BLOOD PRESSURE: 87 MMHG | RESPIRATION RATE: 16 BRPM | TEMPERATURE: 97.2 F | HEIGHT: 62 IN | BODY MASS INDEX: 40.67 KG/M2 | OXYGEN SATURATION: 95 % | WEIGHT: 221 LBS

## 2018-06-26 LAB
ANION GAP SERPL CALCULATED.3IONS-SCNC: 9 MMOL/L (ref 3–14)
BUN SERPL-MCNC: 9 MG/DL (ref 7–30)
CALCIUM SERPL-MCNC: 8.3 MG/DL (ref 8.5–10.1)
CHLORIDE SERPL-SCNC: 106 MMOL/L (ref 94–109)
CO2 SERPL-SCNC: 26 MMOL/L (ref 20–32)
CREAT SERPL-MCNC: 0.68 MG/DL (ref 0.52–1.04)
ERYTHROCYTE [DISTWIDTH] IN BLOOD BY AUTOMATED COUNT: 12.5 % (ref 10–15)
GFR SERPL CREATININE-BSD FRML MDRD: >90 ML/MIN/1.7M2
GLUCOSE SERPL-MCNC: 75 MG/DL (ref 70–99)
HCT VFR BLD AUTO: 35.6 % (ref 35–47)
HGB BLD-MCNC: 11.7 G/DL (ref 11.7–15.7)
MCH RBC QN AUTO: 32 PG (ref 26.5–33)
MCHC RBC AUTO-ENTMCNC: 32.9 G/DL (ref 31.5–36.5)
MCV RBC AUTO: 97 FL (ref 78–100)
PLATELET # BLD AUTO: 180 10E9/L (ref 150–450)
POTASSIUM SERPL-SCNC: 3.5 MMOL/L (ref 3.4–5.3)
RBC # BLD AUTO: 3.66 10E12/L (ref 3.8–5.2)
SODIUM SERPL-SCNC: 141 MMOL/L (ref 133–144)
WBC # BLD AUTO: 3.6 10E9/L (ref 4–11)

## 2018-06-26 PROCEDURE — 25000125 ZZHC RX 250: Performed by: PHYSICIAN ASSISTANT

## 2018-06-26 PROCEDURE — 80048 BASIC METABOLIC PNL TOTAL CA: CPT | Performed by: HOSPITALIST

## 2018-06-26 PROCEDURE — 85027 COMPLETE CBC AUTOMATED: CPT | Performed by: HOSPITALIST

## 2018-06-26 PROCEDURE — 99239 HOSP IP/OBS DSCHRG MGMT >30: CPT | Performed by: HOSPITALIST

## 2018-06-26 PROCEDURE — 36415 COLL VENOUS BLD VENIPUNCTURE: CPT | Performed by: HOSPITALIST

## 2018-06-26 PROCEDURE — 99231 SBSQ HOSP IP/OBS SF/LOW 25: CPT | Performed by: SURGERY

## 2018-06-26 PROCEDURE — 25000132 ZZH RX MED GY IP 250 OP 250 PS 637: Performed by: INTERNAL MEDICINE

## 2018-06-26 RX ADMIN — CLONIDINE HYDROCHLORIDE 0.1 MG: 0.1 TABLET ORAL at 08:01

## 2018-06-26 RX ADMIN — FAMOTIDINE 20 MG: 10 INJECTION INTRAVENOUS at 08:55

## 2018-06-26 RX ADMIN — ATENOLOL 25 MG: 25 TABLET ORAL at 08:01

## 2018-06-26 NOTE — DISCHARGE SUMMARY
[facility]    Discharge Summary  Hospitalist    Date of Admission:  6/25/2018  Date of Discharge:  6/26/2018  Discharging Provider: Isra Sigala  Date of Service (when I saw the patient): 06/26/18    History of Present Illness   Salina Khan is a very pleasant 52 year old female with obesity, s/p gastric bypass surgery, depression, anxiety, alcohol dependence who presents with 1 day history of central abdominal pain.  She had an episode of diarrhea approximately 24 hours ago then diminished appetite throughout the day.  She did have pizza for dinner along with 3-4 alcoholic beverages.  She then felt nauseous and that is when the pain started.  Described as 9/10 intensity, constant, nonradiating.  She has not had this type of pain in the past.  Her gallbladder is surgically absent and was removed during her bypass surgery.  She does not take any regular bowel medications.     She does note that her alcohol intake has escalated over the last months.  No change in her psychiatric medications.  Has attended a few AA meetings.  No history of alcohol withdrawal symptoms.  Last drink was around 8 PM on 6/24/18.  Denies headache, vision changes, shortness of breath, chest pain, palpitations.  Does endorse diffuse itchiness of her skin and there is some light excoriations present on her chest.  No hematemesis, melena, bright red blood per rectum.     Discussed with Dr. Oliveira from the ED. CT of the abdomen was obtained which showed a mechanical small bowel obstruction with moderate fecal material impacted in the distal ileum.  Her pain was controlled with IV Dilaudid.    Hospital Course   Salina Khan was admitted on 6/25/2018.  The following problems were addressed during her hospitalization:    Salina Khan is a very pleasant 52 year old female with obesity, s/p gastric bypass surgery, depression, anxiety, alcohol dependence who was admitted on 6/25/2018 for abdominal pain due to  "SBO.      Small Bowel Obstruction, H/o gastric bypass surgery in 2014 Known abdominal surgery history (c section x 2, gastric bypass) but no SBOs in the past. Fecal impaction present on CT scan but unclear if it is the cause or the effect of the SBO.  Lipase normal.  LFTs normal.  - Surgery follow up.   - Improved.       Alcohol dependence Reports 3-5 alcoholic drinks per day for many months. Reported, \"I know I need to stop.\" Likely not helping her depression symptoms and exceeds recommended alcohol intake for post gastric bypass patients. Denies h/o DTs, seizures.   - Encouraged cessation of alcohol       Depression, Anxiety Chronic, stable. Takes venlafaxine daily.  - Continue venlafaxine      Hypertension Managed PTA with lisinopril.   - lisinopril.      Pruritus No recent medication changes.  No elevation in bilirubin.  Could represent a contact dermatitis.  - Trial topical Sarna lotion      Code Status   Full Code       Primary Care Physician   Melody Barnard    Physical Exam   Temp: 97.6  F (36.4  C) Temp src: Oral BP: 137/75 Pulse: 66   Resp: 16 SpO2: 96 % O2 Device: None (Room air)    Vitals:    06/25/18 0327   Weight: 100.2 kg (221 lb)     Vital Signs with Ranges  Temp:  [97.6  F (36.4  C)-98.6  F (37  C)] 97.6  F (36.4  C)  Pulse:  [66-75] 66  Resp:  [16] 16  BP: (136-143)/(75-84) 137/75  SpO2:  [95 %-96 %] 96 %  I/O last 3 completed shifts:  In: 520 [P.O.:120; I.V.:400]  Out: -     GENERAL: Alert and oriented. NAD. Conversational, appropriate.   HEENT: Normocephalic. EOMI. No icterus or injection. Nares normal.   LUNGS: Clear to auscultation. No dyspnea at rest.   HEART: Regular rate. Extremities perfused.   ABDOMEN: Soft, nontender, and nondistended. Present bowel sounds.   EXTREMITIES: No LE edema noted.   NEUROLOGIC: Moves extremities x4 on command. No acute focal neurologic abnormalities noted.     Discharge Disposition   Discharged to home  Condition at discharge: Stable    Consultations This " Hospital Stay   SURGERY GENERAL IP CONSULT  CARE TRANSITION RN/SW IP CONSULT    Time Spent on this Encounter   I, Isra Sigala, personally saw the patient today and spent greater than 30 minutes discharging this patient.    Discharge Orders     Reason for your hospital stay   Bowel obstruction.     Follow-up and recommended labs and tests    Follow up with primary care provider, Melody Barnard, within 7 days for hospital follow- up.  The following labs/tests are recommended: cbc/bmp.    Follow up with Surgery as directed.     Activity   Your activity upon discharge: activity as tolerated     Full Code     Diet   Follow this diet upon discharge: Orders Placed This Encounter     Low Fiber Diet       Discharge Medications   Current Discharge Medication List      CONTINUE these medications which have NOT CHANGED    Details   acetaminophen (TYLENOL) 500 MG tablet Take 1,000 mg by mouth every 6 hours as needed       calcium citrate (CALCITRATE) 950 MG tablet Take 1 tablet by mouth 3 times daily      CYANOCOBALAMIN SL Place 2,500 mcg under the tongue daily Patient is unsure of dose but 2500 mcg is listed in Care Everywhere      fluconazole (DIFLUCAN) 150 MG tablet One po weekly as needed  Qty: 2 tablet, Refills: 0    Associated Diagnoses: Vulvar candidiasis      Iron-Vitamin C (VITRON-C PO) Take 1 tablet by mouth daily      lisinopril (PRINIVIL/ZESTRIL) 10 MG tablet Take 1 tablet (10 mg) by mouth daily  Qty: 90 tablet, Refills: 3    Associated Diagnoses: Benign essential hypertension      multivitamin  peds with C and FA (FLINTSTONES/MY FIRST) CHEW Take 1 tablet by mouth 2 times daily      VALACYCLOVIR HCL PO Take 1,000 mg by mouth 2 times daily as needed      venlafaxine (EFFEXOR-XR) 75 MG 24 hr capsule Take 1 capsule (75 mg) by mouth daily  Qty: 90 capsule, Refills: 3    Associated Diagnoses: JASON (generalized anxiety disorder); Recurrent major depressive disorder, in full remission (H)      VITAMIN D,  CHOLECALCIFEROL, PO Take 1,000 Units by mouth 2 times daily           Allergies   Allergies   Allergen Reactions     Erythromycin Swelling     Other reaction(s): Edema  topical EES: eye swelling  Eye drops        Nsaids Other (See Comments)     Other reaction(s): Other - Describe In Comment Field  Patient had bariatric surgery. Should not ever take NSAIDS due to high risk for gastric ulcers.   Avoid because of gastric by pass surgery     Data   Most Recent 3 CBC's:  Recent Labs   Lab Test  06/26/18   0705  06/25/18   0343  10/27/15   0937   WBC  3.6*  8.5  3.6*   HGB  11.7  14.9  12.6   MCV  97  94  91.0   PLT  180  278  205      Most Recent 3 BMP's:  Recent Labs   Lab Test  06/26/18   0705  06/25/18   0343  06/30/17   0931   NA  141  140  144   POTASSIUM  3.5  4.0  4.3   CHLORIDE  106  107  100   CO2  26  19*   --    BUN  9  15  13  18   CR  0.68  0.62  0.74   ANIONGAP  9  14   --    ROSAURA  8.3*  9.1  9.1   GLC  75  140*  91     Most Recent 2 LFT's:  Recent Labs   Lab Test  06/25/18   0343  10/27/15   0939   AST  20  18   ALT  24  16   ALKPHOS  120  89   BILITOTAL  0.6  0.4     Most Recent INR's and Anticoagulation Dosing History:  Anticoagulation Dose History     Recent Dosing and Labs Latest Ref Rng & Units 8/16/2013 2/21/2014    INR 0.8 - 1.2 0.9 0.9        Most Recent 3 Troponin's:No lab results found.  Most Recent Cholesterol Panel:  Recent Labs   Lab Test  06/30/17   0931   CHOL  169   LDL  84   HDL  63   TRIG  112     Most Recent 6 Bacteria Isolates From Any Culture (See EPIC Reports for Culture Details):No lab results found.  Most Recent TSH, T4 and A1c Labs:No lab results found.  Results for orders placed or performed during the hospital encounter of 06/25/18   CT Abdomen Pelvis w Contrast    Narrative    CT ABDOMEN/PELVIS WITH CONTRAST 6/25/2018 4:54 AM     HISTORY: Midabdominal pain, nausea. diarrhea. History of gastric  bypass.     TECHNIQUE: Volumetric acquisition through abdomen and pelvis with   IV  contrast,  113 mL Isovue-370.  Radiation dose for this scan was  reduced using automated exposure control, adjustment of the mA and/or  kV according to patient size, or iterative reconstruction technique.    COMPARISON: None.    FINDINGS: Gallbladder is absent. Liver, spleen, pancreas, adrenal  glands and kidneys demonstrate no worrisome findings. Gastric bypass.    Multiple dilated mid to distal small bowel loops extending to the  level of the terminal ileum. The terminal ileum is slightly  thick-walled and there is moderate impacted fecal material in the  distal ileum at the level of the ileocecal junction. No other cause  for obstruction is seen. Small amount of ascites. Mild mesenteric  stranding and edema in the lower abdomen and pelvis. Multiple proximal  small bowel loops are nondilated.    Uterus is present. No suspicious pelvic masses. No pneumatosis or free  air.      Impression    IMPRESSION:  1. Mechanical small bowel obstruction. Moderate fecal material  impacted in the distal ileum. This could be secondary to the  obstruction or the cause of obstruction.  2. Slight wall thickening/edema of the distal ileum.  3. Small amount of ascites.    TERESA DIXON MD

## 2018-06-26 NOTE — PLAN OF CARE
Problem: Bowel Obstruction (Adult)  Goal: Signs and Symptoms of Listed Potential Problems Will be Absent, Minimized or Managed (Bowel Obstruction)  Signs and symptoms of listed potential problems will be absent, minimized or managed by discharge/transition of care (reference Bowel Obstruction (Adult) CPG).   Outcome: No Change  Pt A&Ox4, up with SBA. Tolerating clear liquid diet, PIV SL'd. BS active, (+) flatus, 2 loose stools on shift pt reported as mostly clear. C/o 5/10 abdominal pain, gave Norco x1 with relief. Per surgery pt will not need invasive procedure. Passed Gastrografin Challenge- DC date pending.

## 2018-06-26 NOTE — PROGRESS NOTES
"General Surgery Progress Note    Subjective: feels good, tolerating solid foods, bowel function proper.    Objective: /75 (BP Location: Left arm)  Pulse 66  Temp 97.6  F (36.4  C) (Oral)  Resp 16  Ht 5' 2\" (1.575 m)  Wt 221 lb (100.2 kg)  LMP 10/09/2015 (Approximate)  SpO2 96%  BMI 40.42 kg/m2  Gen: comfortable  Mouth: mucosa well hydrated  Eyes: anicteric   Pulm: nonlabored breathing  Abd: soft, benign  Ext: WWP    Assessment/Plan:   Salina Khan  is a 52 year old female resolved pSBO  - proper hydration  - appropriate mastication  - no surgical intervention needed, she is looking forward to going home today.     will sign off  Call if questions      Jeff Arenas MD  [facility].  [phone]        Total encounter time 15 minutes more than half spent in counseling, review of data and coordination of care.    "

## 2018-06-26 NOTE — PLAN OF CARE
Problem: Patient Care Overview  Goal: Plan of Care/Patient Progress Review  Discharge instructions gone over with patient. Belongings and instructions sent with patient. IV removed. Patient walked out discharge home with .

## 2018-06-26 NOTE — PLAN OF CARE
Problem: Patient Care Overview  Goal: Plan of Care/Patient Progress Review  Outcome: No Change  A/O. VSS on RA. Denied pain. CIWA's intact. LS clear. BS active, +flatus. No BM overnight. However evening nurse report-pt had multiple loose stools. IV-SL. Up with SBA. Continue to monitor.

## 2018-06-27 ENCOUNTER — PATIENT OUTREACH (OUTPATIENT)
Dept: CARE COORDINATION | Facility: CLINIC | Age: 53
End: 2018-06-27

## 2018-06-27 ASSESSMENT — ACTIVITIES OF DAILY LIVING (ADL): DEPENDENT_IADLS:: INDEPENDENT

## 2018-06-27 NOTE — LETTER
[place] CARE COORDINATION  [facility]     June 27, 2018    Salina Khan  [id number]  [place] [address]      Dear Salina,    I am a clinic care coordinator who works with Melody Barnard MD at [facility]. I wanted to thank you for spending the time to talk with me.  I wanted to provide you with my contact information so that you can call me with questions or concerns about your health care. Below is a description of clinic care coordination and how I can further assist you.     The clinic care coordinator is a registered nurse and/or  who understand the health care system. The goal of clinic care coordination is to help you manage your health and improve access to the [facility] system in the most efficient manner. The registered nurse can assist you in meeting your health care goals by providing education, coordinating services, and strengthening the communication among your providers. The  can assist you with financial, behavioral, psychosocial, chemical dependency, counseling, and/or psychiatric resources.    Please feel free to contact me at [phone] or [phone], with any questions or concerns. We at [facility] are focused on providing you with the highest-quality healthcare experience possible and that all starts with you.     Sincerely,     Kim Crowell RN Care Coordinator

## 2018-06-27 NOTE — PROGRESS NOTES
"Clinic Care Coordination Contact    Clinic Care Coordination Contact  OUTREACH    Referral Information:  Referral Source: IP Report    Primary Diagnosis: GI Disorders    Chief Complaint   Patient presents with     Clinic Care Coordination - Post Hospital     Discharged to Home         Universal Utilization: Proper Utilization at this time.   Difficulty keeping appointments:: No  Utilization    Last refreshed: 6/27/2018 10:56 AM:  No Show Count (past year) 0       Last refreshed: 6/27/2018 10:56 AM:  ED visits 0       Last refreshed: 6/27/2018 10:56 AM:  Hospital admissions 1          Current as of: 6/27/2018 10:56 AM             Clinical Concerns:  Current Medical Concerns: Patient was recently in the hospital due to a small bowel obstruction. Patient presented to the ED after having increased pain that was constant and nonradiating after eating pizza and having some alcohol beverages. CT was completed in the ED which showed a mechanical small bowel obstruction with moderate fecal material impacted in the distal ileum. Pain was controlled with Dilaudid.  Surgery consulted and no intervention needed.   Spoke with the patient today who stated she continues to have some loose stool but the pain is diminished. Patient stated she feels better. Patient did state that she has been having increase skin itching. Patient was going to try Sarna that was recommended by the hospitalist.   Patient was going to follow up with her PCP. She was going to call to make an appointment. Patient denied any concerns at this time.     Current Behavioral Concerns: Patient did report in the hospital that she drinks 3-5 alcoholic drinks per day for many months. She stated \"I know I need to stop\" in the hospital. RN CC discuss if the patient would be open to resources and she denied. Will route message to PCP. Patient does have a history of depression. Hospital provided education in regards to drinking and depression. Again patient denied any " resources. Patient taking venlafaxine which she denied concerns with.     Education Provided to patient: Care Coordination. Medication side effects. Sarna Lotion.      Pain  Chronic pain (GOAL):: No  Health Maintenance Reviewed: Due/Overdue (Will route message to PCP)    Medication Management:  Medications Reviewed during this phone call. Patient asked if her itching could be related to her Effexor. Patient has been on Effexor for a year and itching is fairly new. No indication that they are related. Encouraged patient to try over the counter Sarna.      Functional Status:  Dependent ADLs:: Independent  Dependent IADLs:: Independent  Bed or wheelchair confined:: No  Mobility Status: Independent  Fallen 2 or more times in the past year?: No  Any fall with injury in the past year?: No    Living Situation:  Current living arrangement:: I live in a private home with family    Diet/Exercise/Sleep:  Diet:: Regular  Inadequate nutrition (GOAL):: No  Food Insecurity: No  Tube Feeding: No  Inadequate activity/exercise (GOAL):: No  Significant changes in sleep pattern (GOAL): No    Transportation:  Transportation concerns (GOAL):: No  Transportation means:: Regular car     Psychosocial:  Taoist or spiritual beliefs that impact treatment:: No  Mental health DX:: Yes  Mental health DX how managed:: Medication  Mental health management concern (GOAL):: No  Informal Support system:: Family     Financial/Insurance: [employer] [place]   Financial/Insurance concerns (GOAL):: No       Resources and Interventions:  Equipment Currently Used at Home: none    Advance Care Plan/Directive  Advanced Care Plans/Directives on file:: No  Advanced Care Plan/Directive Status: Declined Further Information      Patient/Caregiver understanding: Patient verbalized understanding, engaged in AIDET communication behavior during encounter.           Plan:  1. RN CC will do no further outreaches at this time. Patient denied the need for Care Coordination  Services.   Patient will follow up with her provider. RN CC will be available should another referral be made.     Edilia Martin RN  Clinic Care Coordinator  [phone]  [email or web address]

## 2018-06-27 NOTE — PROGRESS NOTES
Clinic Care Coordination Contact  [facility] [[email or web address]       Clinical Data: Care Coordinator Outreach  Outreach attempted x 1.  Left message on voicemail with call back information and requested return call.  Plan: Care Coordinator will try to reach patient again in 1-2 business days.    Edilia Martin RN  Clinic Care Coordinator  [phone]  [email or web address]

## 2018-07-18 ENCOUNTER — OFFICE VISIT (OUTPATIENT)
Dept: FAMILY MEDICINE | Facility: CLINIC | Age: 53
End: 2018-07-18

## 2018-07-18 VITALS
WEIGHT: 221 LBS | TEMPERATURE: 98.3 F | SYSTOLIC BLOOD PRESSURE: 152 MMHG | BODY MASS INDEX: 40.42 KG/M2 | HEART RATE: 88 BPM | DIASTOLIC BLOOD PRESSURE: 84 MMHG

## 2018-07-18 DIAGNOSIS — Z87.19 HISTORY OF SMALL BOWEL OBSTRUCTION: Primary | ICD-10-CM

## 2018-07-18 DIAGNOSIS — E66.01 MORBID OBESITY (H): ICD-10-CM

## 2018-07-18 DIAGNOSIS — I10 BENIGN ESSENTIAL HYPERTENSION: ICD-10-CM

## 2018-07-18 DIAGNOSIS — R71.0 DECREASED HEMOGLOBIN: ICD-10-CM

## 2018-07-18 LAB
% GRANULOCYTES: 68.6 % (ref 42.2–75.2)
HCT VFR BLD AUTO: 40 % (ref 35–46)
HEMOGLOBIN: 13.1 G/DL (ref 11.8–15.5)
LYMPHOCYTES NFR BLD AUTO: 23.6 % (ref 20.5–51.1)
MCH RBC QN AUTO: 31.4 PG (ref 27–31)
MCHC RBC AUTO-ENTMCNC: 32.7 G/DL (ref 33–37)
MCV RBC AUTO: 95.9 FL (ref 80–100)
MONOCYTES NFR BLD AUTO: 7.8 % (ref 1.7–9.3)
PLATELET # BLD AUTO: 237 K/UL (ref 140–450)
RBC # BLD AUTO: 4.17 X10/CMM (ref 3.7–5.2)
WBC # BLD AUTO: 5.3 X10/CMM (ref 3.8–11)

## 2018-07-18 PROCEDURE — 99214 OFFICE O/P EST MOD 30 MIN: CPT | Performed by: FAMILY MEDICINE

## 2018-07-18 PROCEDURE — 83550 IRON BINDING TEST: CPT | Mod: 90 | Performed by: FAMILY MEDICINE

## 2018-07-18 PROCEDURE — 36415 COLL VENOUS BLD VENIPUNCTURE: CPT | Performed by: FAMILY MEDICINE

## 2018-07-18 PROCEDURE — 85025 COMPLETE CBC W/AUTO DIFF WBC: CPT | Performed by: FAMILY MEDICINE

## 2018-07-18 PROCEDURE — 83540 ASSAY OF IRON: CPT | Mod: 90 | Performed by: FAMILY MEDICINE

## 2018-07-18 RX ORDER — LISINOPRIL 20 MG/1
20 TABLET ORAL DAILY
Qty: 90 TABLET | Refills: 3 | Status: SHIPPED | OUTPATIENT
Start: 2018-07-18 | End: 2019-08-17

## 2018-07-18 NOTE — MR AVS SNAPSHOT
"              After Visit Summary   2018    Salina Khan    MRN: [id number]           Patient Information     Date Of Birth          1965        Visit Information        Provider Department      2018 12:30 PM Melody Barnard MD [facility]        Today's Diagnoses     History of small bowel obstruction    -  1    Benign essential hypertension        Morbid obesity (H)        Decreased hemoglobin           Follow-ups after your visit        Who to contact     If you have questions or need follow up information about today's clinic visit or your schedule please contact [facility] directly at [phone].  Normal or non-critical lab and imaging results will be communicated to you by [employer], letter or phone within 4 business days after the clinic has received the results. If you do not hear from us within 7 days, please contact the clinic through E[employer]el[employer] or phone. If you have a critical or abnormal lab result, we will notify you by phone as soon as possible.  Submit refill requests through [employer] or call your pharmacy and they will forward the refill request to us. Please allow 3 business days for your refill to be completed.          Additional Information About Your Visit        MyChart Information     [employer] lets you send messages to your doctor, view your test results, renew your prescriptions, schedule appointments and more. To sign up, go to [email or web address] . Click on \"Log in\" on the left side of the screen, which will take you to the Welcome page. Then click on \"Sign up Now\" on the right side of the page.     You will be asked to enter the access code listed below, as well as some personal information. Please follow the directions to create your username and password.     Your access code is: [email or web address]  Expires: 2018 12:44 PM     Your access code will  in 90 days. If you need help or a new code, please call your [facility] clinic or " [phone].        Care EveryWhere ID     This is your Care EveryWhere ID. This could be used by other organizations to access your [facility] medical records  [id number]        Your Vitals Were     Pulse Temperature Last Period BMI (Body Mass Index)          88 98.3  F (36.8  C) 10/09/2015 (Approximate) 40.42 kg/m2         Blood Pressure from Last 3 Encounters:   07/18/18 152/84   06/26/18 145/87   09/28/17 160/84    Weight from Last 3 Encounters:   07/18/18 100.2 kg (221 lb)   06/25/18 100.2 kg (221 lb)   09/28/17 98 kg (216 lb)              We Performed the Following     CBC with Diff/Plt ([facility])     Iron and TIBC ([facility])          Today's Medication Changes          These changes are accurate as of 7/18/18  1:12 PM.  If you have any questions, ask your nurse or doctor.               These medicines have changed or have updated prescriptions.        Dose/Directions    lisinopril 20 MG tablet   Commonly known as:  PRINIVIL/ZESTRIL   This may have changed:    - medication strength  - how much to take   Used for:  Benign essential hypertension   Changed by:  Melody Barnard MD        Dose:  20 mg   Take 1 tablet (20 mg) by mouth daily   Quantity:  90 tablet   Refills:  3            Where to get your medicines      These medications were sent to [facility] [address] [place] [address] [address] AT [facility] [address] [place]  [address], [facility] [address]     Phone:  [phone]     lisinopril 20 MG tablet                Primary Care Provider Office Phone # Fax #    Melody Barnard [phone] [address] [place] [address]        Equal Access to Services     FAHAD SOTOMAYOR AH: Namita kemp Socece, waaxda luqadaha, qaybta kaalmada adeegyada, sandy sanabria. So [facility] [phone].    ATENCIÓN: Si habla español, tiene a riley disposición servicios gratuitos de asistencia lingüística. Llame al  [phone].    We comply with applicable federal civil rights laws and [place] laws. We do not discriminate on the basis of race, color, national origin, age, disability, sex, sexual orientation, or gender identity.            Thank you!     Thank you for choosing [facility]  for your care. Our goal is always to provide you with excellent care. Hearing back from our patients is one way we can continue to improve our services. Please take a few minutes to complete the written survey that you may receive in the mail after your visit with us. Thank you!             Your Updated Medication List - Protect others around you: Learn how to safely use, store and throw away your medicines at www.disposemymeds.org.          This list is accurate as of 7/18/18  1:12 PM.  Always use your most recent med list.                   Brand Name Dispense Instructions for use Diagnosis    acetaminophen 500 MG tablet    TYLENOL     Take 1,000 mg by mouth every 6 hours as needed        calcium citrate 950 MG tablet    CALCITRATE     Take 1 tablet by mouth 3 times daily        CYANOCOBALAMIN SL      Place 2,500 mcg under the tongue daily Patient is unsure of dose but 2500 mcg is listed in Care Everywhere        fluconazole 150 MG tablet    DIFLUCAN    2 tablet    One po weekly as needed    Vulvar candidiasis       lisinopril 20 MG tablet    PRINIVIL/ZESTRIL    90 tablet    Take 1 tablet (20 mg) by mouth daily    Benign essential hypertension       multivitamin  peds with C and FA Chew      Take 1 tablet by mouth 2 times daily        VALACYCLOVIR HCL PO      Take 1,000 mg by mouth 2 times daily as needed        venlafaxine 75 MG 24 hr capsule    EFFEXOR-XR    90 capsule    Take 1 capsule (75 mg) by mouth daily    JASON (generalized anxiety disorder), Recurrent major depressive disorder, in full remission (H)       VITAMIN D (CHOLECALCIFEROL) PO      Take 1,000 Units by mouth 2 times daily        VITRON-C PO      Take 1 tablet  by mouth daily

## 2018-07-18 NOTE — PROGRESS NOTES
SUBJECTIVE:                                                      Patient presents for Hospital Followup Visit:    Hospital:  [facility]      Date of Admission: 6/25/18  Date of Discharge: 6/26/18  Reason(s) for Admission: Bowel obstruction            Problems taking medications regularly:  None       Medication changes since discharge: None       Problems adhering to non-medication therapy:  None    Summary of hospitalization:  [facility] discharge summary reviewed  Diagnostic Tests/Treatments reviewed.  Follow up needed: none  Other Healthcare Providers Involved in Patient s Care:         None  Update since discharge: improved. Patient has history of C/S x 2 and gastric bypass. She had small bowel obstruction believed to be related to C/S scar. Her symptoms came on fairly quickly, required about 36 hours of nothing by mouth, did not require NG tube. She has been afraid to restart her iron supplement as she sometimes gets constipated with this. She has been better about hydration.     ROS:  Constitutional, HEENT, cardiovascular, pulmonary, gi and gu systems are negative, except as otherwise noted.    OBJECTIVE:                                                      GENERAL APPEARANCE: healthy, alert and no distress  EYES: Eyes grossly normal to inspection, PERRL and conjunctivae and sclerae normal  NECK: no adenopathy, no asymmetry, masses, or scars and thyroid normal to palpation  RESP: lungs clear to auscultation - no rales, rhonchi or wheezes  CV: regular rates and rhythm, normal S1 S2, no S3 or S4 and no murmur, click or rub  ABDOMEN: soft, nontender, without hepatosplenomegaly or masses and bowel sounds normal  MS: extremities normal- no gross deformities noted  NEURO: Normal strength and tone, mentation intact and speech normal  PSYCH: mentation appears normal and affect normal/bright    ASSESSMENT/PLAN:                                                      Assessment/Plan:  Salina was seen  today for hospital f/u.    Diagnoses and all orders for this visit:    History of small bowel obstruction  -     CBC with Diff/Plt ([facility])  Resolved. If this becomes recurrent, we will need surgery consult to discuss KEZIA.    Benign essential hypertension  -     lisinopril (PRINIVIL/ZESTRIL) 20 MG tablet; Take 1 tablet (20 mg) by mouth daily  Lisinopril dose is increased to 20 mg daily. She will report back blood pressures in a few weeks.    Morbid obesity (H)    Decreased hemoglobin  -     CBC with Diff/Plt ([facility])  -     Iron and TIBC ([facility])  Need to verify blood counts and iron level. She will stop iron until studies are back. To avoid constipation, recommend dietary fiber and 64 ounces of water daily.      Melody Barnard MD  [facility]  [phone]          Post Discharge Medication Reconciliation: discharge medications reconciled and changed, per note/orders (see AVS).  Issues to address: Issues identified were:   elevated blood pressure.  Plan of care communicated with patient      Type of Medical Decision Making Face-to-Face Visit within 7 Days Face-to-Face Visit within 14 days   Moderate Complexity [id number]   High Complexity [id number]

## 2018-07-18 NOTE — LETTER
[facility] [address] [place]  [address]  Phone: [phone]    July 20, 2018      Salina Khan  [address] [facility] [address]              Dear Salina,    The results from your recent visit showed Iron studies and blood counts are normal. You do not need to continue daily iron supplement.        Sincerely,     Melody Barnard M.D.    Results for orders placed or performed in visit on 07/18/18   CBC with Diff/Plt ([facility])   Result Value Ref Range    WBC x10/cmm 5.3 3.8 - 11.0 x10/cmm    % Lymphocytes 23.6 20.5 - 51.1 %    % Monocytes 7.8 1.7 - 9.3 %    % Granulocytes 68.6 42.2 - 75.2 %    RBC x10/cmm 4.17 3.7 - 5.2 x10/cmm    Hemoglobin 13.1 11.8 - 15.5 g/dl    Hematocrit 40.0 35 - 46 %    MCV 95.9 80 - 100 fL    MCH 31.4 (A) 27.0 - 31.0 pg    MCHC 32.7 (A) 33.0 - 37.0 g/dL    Platelet Count 237 140 - 450 K/uL   Iron and TIBC ([facility])   Result Value Ref Range    Iron Binding Cap 260 250 - 450 ug/dL    UIBC 171 131 - 425 ug/dL    Iron 89 27 - 159 ug/dL    Iron Saturation 34 15 - 55 %    Narrative    Performed at:  01 - [facility] [address], [place], [place]  [id number]  : Tomas Edmonds MD, Phone:  [phone]

## 2018-07-19 LAB
IRON BINDING CAP: 260 UG/DL (ref 250–450)
IRON SATURATION: 34 % (ref 15–55)
IRON: 89 UG/DL (ref 27–159)
UIBC: 171 UG/DL (ref 131–425)

## 2018-10-15 DIAGNOSIS — F41.1 GAD (GENERALIZED ANXIETY DISORDER): ICD-10-CM

## 2018-10-15 DIAGNOSIS — F33.42 RECURRENT MAJOR DEPRESSIVE DISORDER, IN FULL REMISSION (H): ICD-10-CM

## 2018-10-15 RX ORDER — VENLAFAXINE HYDROCHLORIDE 75 MG/1
75 CAPSULE, EXTENDED RELEASE ORAL DAILY
Qty: 90 CAPSULE | Refills: 0 | Status: SHIPPED | OUTPATIENT
Start: 2018-10-15 | End: 2018-12-27

## 2018-12-05 ENCOUNTER — TELEPHONE (OUTPATIENT)
Dept: FAMILY MEDICINE | Facility: CLINIC | Age: 53
End: 2018-12-05

## 2018-12-05 DIAGNOSIS — F10.20 ALCOHOLISM (H): Primary | ICD-10-CM

## 2018-12-05 RX ORDER — NALTREXONE HYDROCHLORIDE 50 MG/1
50 TABLET, FILM COATED ORAL DAILY
Qty: 30 TABLET | Refills: 0 | Status: SHIPPED | OUTPATIENT
Start: 2018-12-05 | End: 2018-12-27

## 2018-12-05 NOTE — TELEPHONE ENCOUNTER
Patient had left message on triage voicemail 11/19/18 requesting refill on naltrexone -- recently released from [facility] treatment facility and is on this med but will run out soon. Patient did have appt scheduled for this day with Dr. Barnard but stated had to cancel this and will reschedule but hoped Dr. Barnard could refill med for her.   We had left message for her to call nurse with more details - dose and strength of med as well as discharge records from facility.     Patient called again  11/26/18 with same request. stating had been off med for 1 week at this point. Reported then that facility she was at was [facility] in [facility] [phone]. Discussed need more information before can rx this med without seeing her. Offered appt with MD.     Patient calls today requesting refill on naltrexone to last until her cpx appt with Dr. Barnard 1/15/19. Patient states faxed us records a week ago - we don't have them. Patient has records and reports naltrexone dosing was 50mg QD.   We decided necessary to see Dr. Barnard sooner than 1/15/19 cpx and appt scheduled for 12/10/18. Patient will bring in records to this appt.   With this plan in place, Dr. Barnard agrees to order naltrexone 50mg QD #30. Rx sent to pharmacy.  Linda Larsen RN

## 2018-12-18 ENCOUNTER — TRANSFERRED RECORDS (OUTPATIENT)
Dept: FAMILY MEDICINE | Facility: CLINIC | Age: 53
End: 2018-12-18

## 2018-12-27 ENCOUNTER — OFFICE VISIT (OUTPATIENT)
Dept: FAMILY MEDICINE | Facility: CLINIC | Age: 53
End: 2018-12-27

## 2018-12-27 VITALS
DIASTOLIC BLOOD PRESSURE: 88 MMHG | OXYGEN SATURATION: 97 % | BODY MASS INDEX: 41.34 KG/M2 | WEIGHT: 226 LBS | SYSTOLIC BLOOD PRESSURE: 152 MMHG | HEART RATE: 81 BPM

## 2018-12-27 DIAGNOSIS — Z23 NEED FOR PROPHYLACTIC VACCINATION AND INOCULATION AGAINST INFLUENZA: Primary | ICD-10-CM

## 2018-12-27 DIAGNOSIS — Z98.84 HISTORY OF ROUX-EN-Y GASTRIC BYPASS: ICD-10-CM

## 2018-12-27 DIAGNOSIS — E55.9 VITAMIN D DEFICIENCY: ICD-10-CM

## 2018-12-27 DIAGNOSIS — Z13.6 SCREENING FOR CARDIOVASCULAR CONDITION: ICD-10-CM

## 2018-12-27 DIAGNOSIS — I10 BENIGN ESSENTIAL HYPERTENSION: ICD-10-CM

## 2018-12-27 DIAGNOSIS — F33.42 RECURRENT MAJOR DEPRESSIVE DISORDER, IN FULL REMISSION (H): ICD-10-CM

## 2018-12-27 DIAGNOSIS — F10.20 ALCOHOLISM (H): ICD-10-CM

## 2018-12-27 DIAGNOSIS — F41.1 GAD (GENERALIZED ANXIETY DISORDER): ICD-10-CM

## 2018-12-27 PROCEDURE — 90686 IIV4 VACC NO PRSV 0.5 ML IM: CPT | Performed by: FAMILY MEDICINE

## 2018-12-27 PROCEDURE — 36415 COLL VENOUS BLD VENIPUNCTURE: CPT | Performed by: FAMILY MEDICINE

## 2018-12-27 PROCEDURE — 90471 IMMUNIZATION ADMIN: CPT | Performed by: FAMILY MEDICINE

## 2018-12-27 PROCEDURE — 80061 LIPID PANEL: CPT | Mod: 90 | Performed by: FAMILY MEDICINE

## 2018-12-27 PROCEDURE — 80053 COMPREHEN METABOLIC PANEL: CPT | Mod: 90 | Performed by: FAMILY MEDICINE

## 2018-12-27 PROCEDURE — 82607 VITAMIN B-12: CPT | Mod: 90 | Performed by: FAMILY MEDICINE

## 2018-12-27 PROCEDURE — 82306 VITAMIN D 25 HYDROXY: CPT | Mod: 90 | Performed by: FAMILY MEDICINE

## 2018-12-27 PROCEDURE — 99214 OFFICE O/P EST MOD 30 MIN: CPT | Mod: 25 | Performed by: FAMILY MEDICINE

## 2018-12-27 RX ORDER — VENLAFAXINE HYDROCHLORIDE 75 MG/1
75 CAPSULE, EXTENDED RELEASE ORAL DAILY
Qty: 90 CAPSULE | Refills: 3 | Status: SHIPPED | OUTPATIENT
Start: 2018-12-27 | End: 2020-02-06

## 2018-12-27 RX ORDER — NALTREXONE HYDROCHLORIDE 50 MG/1
50 TABLET, FILM COATED ORAL DAILY
Qty: 90 TABLET | Refills: 3 | Status: SHIPPED | OUTPATIENT
Start: 2018-12-27 | End: 2019-08-19

## 2018-12-27 ASSESSMENT — PATIENT HEALTH QUESTIONNAIRE - PHQ9
5. POOR APPETITE OR OVEREATING: NOT AT ALL
SUM OF ALL RESPONSES TO PHQ QUESTIONS 1-9: 4

## 2018-12-27 ASSESSMENT — ANXIETY QUESTIONNAIRES
7. FEELING AFRAID AS IF SOMETHING AWFUL MIGHT HAPPEN: SEVERAL DAYS
5. BEING SO RESTLESS THAT IT IS HARD TO SIT STILL: NOT AT ALL
3. WORRYING TOO MUCH ABOUT DIFFERENT THINGS: NOT AT ALL
2. NOT BEING ABLE TO STOP OR CONTROL WORRYING: NOT AT ALL
6. BECOMING EASILY ANNOYED OR IRRITABLE: NOT AT ALL
GAD7 TOTAL SCORE: 2
1. FEELING NERVOUS, ANXIOUS, OR ON EDGE: SEVERAL DAYS

## 2018-12-27 NOTE — LETTER
[facility] [address] [place]  [address]  Phone: [phone]    January 2, 2019      Salina Khan  [address] [facility] [address]              Dear Salina,    The results from your recent visit showed Vitamin D level is too low, please increase supplement by 2,000 international unit(s) daily.   B12 level is low normal. If you are using subcutaneous supplement, increase the interval between dosing.   Cholesterol is great.   Kidney and liver functions are good. Sodium and bicarb are slightly elevated but not worrisome.         Sincerely,     Melody Barnard M.D.    Results for orders placed or performed in visit on 12/27/18   Vitamin D  25-Hydroxy ([facility])   Result Value Ref Range    Vitamin D,25-Hydroxy 24.1 (L) 30.0 - 100.0 ng/mL    Narrative    Performed at:  01 - [facility] [address], [place], [place]  [id number]  : Adilson Thakkar MD, Phone:  [phone]   Vitamin B12 ([facility])   Result Value Ref Range    Vitamin B12 256 232 - 1,245 pg/mL    Narrative    Performed at:  01 - [facility] [address], [place], [place]  [id number]  : Adilson Thakkar MD, Phone:  [phone]   Lipid Panel ([facility])   Result Value Ref Range    Cholesterol 171 100 - 199 mg/dL    Triglycerides 74 0 - 149 mg/dL    HDL Cholesterol 88 >39 mg/dL    VLDL Cholesterol Drew 15 5 - 40 mg/dL    LDL Cholesterol Calculated 68 0 - 99 mg/dL    LDL/HDL Ratio 0.8 0.0 - 3.2 ratio    Narrative    Performed at:  01 - [facility] [address], [place], [place]  [id number]  : Adilson Thakkar MD, Phone:  [phone]   Comp. Metabolic Panel (14) ([facility])   Result Value Ref Range    Glucose 97 65 - 99 mg/dL    Urea Nitrogen 14 6 - 24 mg/dL    Creatinine 0.69 0.57 - 1.00 mg/dL    eGFR If NonAfricn Am 99 >59 mL/min/1.73    eGFR If Africn Am 114 >59 mL/min/1.73    BUN/Creatinine Ratio 20 9 - 23    Sodium 145 (H) 134 - 144 mmol/L    Potassium 4.5 3.5 - 5.2 mmol/L    Chloride 104  96 - 106 mmol/L    Total CO2 31 (H) 20 - 29 mmol/L    Calcium 8.9 8.7 - 10.2 mg/dL    Protein Total 6.4 6.0 - 8.5 g/dL    Albumin 4.0 3.5 - 5.5 g/dL    Globulin, Total 2.4 1.5 - 4.5 g/dL    A/G Ratio 1.7 1.2 - 2.2    Bilirubin Total 0.2 0.0 - 1.2 mg/dL    Alkaline Phosphatase 94 39 - 117 IU/L    AST 16 0 - 40 IU/L    ALT 15 0 - 32 IU/L    Narrative    Performed at:  01 - [facility] [address], [place], [place]  [id number]  : Adilson Thakkar MD, Phone:  [phone]

## 2018-12-27 NOTE — PROGRESS NOTES
Problem(s) Oriented visit        SUBJECTIVE:                                                    Salina Khan is a 53 year old female who presents to clinic today for medication check. She is s/p gastric bypass almost 5 years ago. Following that she had issue with alcohol. She completed a 30 day inpatient alcohol treatment starting with detox, then the program. She had 2 months of sobriety, had a brief lapse in November, now has another month of sobriety under her belt again. She is understanding that the only amount of alcohol that is okay is NONE. She has outpatient treatment 3 nights weekly. She has a therapist that she sees weekly. She is getting good support at home as well. She has found naltrexone to be helpful. Her moods feel stable, much better on the venlafaxine and off the alcohol.     She is tolerating lisinopril for HTN. She has a home cuff but hasn't used it recently. She denies chest pain or pressure with exertion. She has a [employer] membership and tries to go regularly.       Problem list, Medication list, Allergies, and Medical/Social/Surgical histories reviewed in EPIC and updated as appropriate.   Additional history: as documented    ROS:  5 point ROS completed and negative except noted above, including Gen, CV, Resp, GI, MS      Histories:   Patient Active Problem List   Diagnosis     Gestational diabetes     Health Care Home     Benign essential hypertension     Non morbid obesity due to excess calories     Recurrent major depressive disorder, in full remission (H)     Small bowel obstruction (H)     Morbid obesity (H)     Past Surgical History:   Procedure Laterality Date     C/SECTION, CLASSICAL      , Classical     TUBAL LIGATION         Social History     Tobacco Use     Smoking status: Never Smoker     Smokeless tobacco: Never Used   Substance Use Topics     Alcohol use: Yes     Alcohol/week: 3.0 oz     Types: 5 Standard drinks or equivalent per week     Family History   Problem  Relation Age of Onset     Hypertension Mother      Congenital Anomalies Father         cystic fibrosis  age 70's     Diabetes Father            OBJECTIVE:                                                    /88   Pulse 81   Wt 102.5 kg (226 lb)   LMP 10/09/2015 (Approximate)   SpO2 97%   BMI 41.34 kg/m    Body mass index is 41.34 kg/m .   GENERAL APPEARANCE: Alert, no acute distress  NECK: No adenopathy,masses or thyromegaly  RESP: lungs clear to auscultation   CV: normal rate, regular rhythm, no murmur or gallop  MS: extremities normal, no peripheral edema  NEURO: Alert, oriented, speech and mentation normal  PSYCH: mentation appears normal, affect and mood normal   Labs Resulted Today:   Results for orders placed or performed in visit on 18   CBC with Diff/Plt ([facility])   Result Value Ref Range    WBC x10/cmm 5.3 3.8 - 11.0 x10/cmm    % Lymphocytes 23.6 20.5 - 51.1 %    % Monocytes 7.8 1.7 - 9.3 %    % Granulocytes 68.6 42.2 - 75.2 %    RBC x10/cmm 4.17 3.7 - 5.2 x10/cmm    Hemoglobin 13.1 11.8 - 15.5 g/dl    Hematocrit 40.0 35 - 46 %    MCV 95.9 80 - 100 fL    MCH 31.4 (A) 27.0 - 31.0 pg    MCHC 32.7 (A) 33.0 - 37.0 g/dL    Platelet Count 237 140 - 450 K/uL   Iron and TIBC ([facility])   Result Value Ref Range    Iron Binding Cap 260 250 - 450 ug/dL    UIBC 171 131 - 425 ug/dL    Iron 89 27 - 159 ug/dL    Iron Saturation 34 15 - 55 %    Narrative    Performed at:  01 - [facility]  [address] [place], [place]  [id number]  : Tomas Edmonds MD, Phone:  [phone]     ASSESSMENT/PLAN:                                                        Salina was seen today for follow up.    Diagnoses and all orders for this visit:    Need for prophylactic vaccination and inoculation against influenza  -     FLU VACCINE, SPLIT VIRUS, IM (QUADRIVALENT) [41496]- >3 YRS  -     Vaccine Administration, Initial [37262]    Alcoholism (H)  -     naltrexone (DEPADE/REVIA) 50 MG tablet; Take 1 tablet (50 mg)  by mouth daily  -     Comp. Metabolic Panel (14) ([facility])  Doing well, getting benefit from naltrexone which is continued.    JASON (generalized anxiety disorder) / Recurrent major depressive disorder, in full remission (H)  -     venlafaxine (EFFEXOR-XR) 75 MG 24 hr capsule; Take 1 capsule (75 mg) by mouth daily  Doing well, continue same dose. Encouraged to continue talk therapy.    Benign essential hypertension  -     Comp. Metabolic Panel (14) ([facility])  Blood pressure is high today but I would prefer to have her gather a few home readings before deciding on her dose of lisinopril She will call in 2-3 weeks with readings and decision will be made then. Recommend low salt diet.     History of Emmy-en-Y gastric bypass  -     Vitamin D  25-Hydroxy ([facility])  -     Vitamin B12 ([facility])  Verify vitamin levels are normal.    Screening for cardiovascular condition  -     Lipid Panel ([facility])    Vitamin D deficiency  -     Vitamin D  25-Hydroxy ([facility])        There are no Patient Instructions on file for this visit.    The following health maintenance items are reviewed in Epic and correct as of today:  Health Maintenance   Topic Date Due     DEPRESSION ACTION PLAN  08/06/1982     HIV SCREEN (SYSTEM ASSIGNED)  08/06/1982     HEPATITIS C SCREENING  08/06/1982     DTAP/TDAP/TD IMMUNIZATION (1 - Tdap) 08/06/1989     MAMMO SCREEN Q2 YR (SYSTEM ASSIGNED)  08/06/2004     COLON CANCER SCREEN (SYSTEM ASSIGNED)  08/06/2014     ZOSTER IMMUNIZATION (1 of 2) 08/06/2014     JASON QUESTIONNAIRE 6 MONTHS  03/28/2018     PHQ-9 Q6 MONTHS  03/28/2018     INFLUENZA VACCINE (1) 09/01/2018     PAP SCREENING Q3 YR (SYSTEM ASSIGNED)  10/27/2018     LIPID SCREEN Q5 YR FEMALE (SYSTEM ASSIGNED)  06/30/2022     IPV IMMUNIZATION  Aged Out     MENINGITIS IMMUNIZATION  Aged Out       Melody Barnard MD  [facility]  [facility]  [phone]    For any issues my office # is [phone]

## 2018-12-27 NOTE — PROGRESS NOTES

## 2018-12-28 LAB
ALBUMIN SERPL-MCNC: 4 G/DL (ref 3.5–5.5)
ALBUMIN/GLOB SERPL: 1.7 {RATIO} (ref 1.2–2.2)
ALP SERPL-CCNC: 94 IU/L (ref 39–117)
ALT SERPL-CCNC: 15 IU/L (ref 0–32)
AST SERPL-CCNC: 16 IU/L (ref 0–40)
BILIRUB SERPL-MCNC: 0.2 MG/DL (ref 0–1.2)
BUN SERPL-MCNC: 14 MG/DL (ref 6–24)
BUN/CREATININE RATIO: 20 (ref 9–23)
CALCIUM SERPL-MCNC: 8.9 MG/DL (ref 8.7–10.2)
CHLORIDE SERPLBLD-SCNC: 104 MMOL/L (ref 96–106)
CHOLEST SERPL-MCNC: 171 MG/DL (ref 100–199)
CREAT SERPL-MCNC: 0.69 MG/DL (ref 0.57–1)
EGFR IF AFRICN AM: 114 ML/MIN/1.73
EGFR IF NONAFRICN AM: 99 ML/MIN/1.73
GLOBULIN, TOTAL: 2.4 G/DL (ref 1.5–4.5)
GLUCOSE SERPL-MCNC: 97 MG/DL (ref 65–99)
HDLC SERPL-MCNC: 88 MG/DL
LDL/HDL RATIO: 0.8 RATIO (ref 0–3.2)
LDLC SERPL CALC-MCNC: 68 MG/DL (ref 0–99)
POTASSIUM SERPL-SCNC: 4.5 MMOL/L (ref 3.5–5.2)
PROT SERPL-MCNC: 6.4 G/DL (ref 6–8.5)
SODIUM SERPL-SCNC: 145 MMOL/L (ref 134–144)
TOTAL CO2: 31 MMOL/L (ref 20–29)
TRIGL SERPL-MCNC: 74 MG/DL (ref 0–149)
VIT B12 SERPL-MCNC: 256 PG/ML (ref 232–1245)
VITAMIN D, 25-HYDROXY: 24.1 NG/ML (ref 30–100)
VLDLC SERPL CALC-MCNC: 15 MG/DL (ref 5–40)

## 2018-12-29 ASSESSMENT — ANXIETY QUESTIONNAIRES: GAD7 TOTAL SCORE: 2

## 2019-01-15 ENCOUNTER — OFFICE VISIT (OUTPATIENT)
Dept: FAMILY MEDICINE | Facility: CLINIC | Age: 54
End: 2019-01-15

## 2019-01-15 VITALS
RESPIRATION RATE: 16 BRPM | DIASTOLIC BLOOD PRESSURE: 84 MMHG | SYSTOLIC BLOOD PRESSURE: 118 MMHG | HEART RATE: 60 BPM | OXYGEN SATURATION: 95 % | HEIGHT: 63 IN | WEIGHT: 224.4 LBS | BODY MASS INDEX: 39.76 KG/M2

## 2019-01-15 DIAGNOSIS — F10.21 ALCOHOLISM IN REMISSION (H): ICD-10-CM

## 2019-01-15 DIAGNOSIS — F33.42 RECURRENT MAJOR DEPRESSIVE DISORDER, IN FULL REMISSION (H): ICD-10-CM

## 2019-01-15 DIAGNOSIS — Z00.00 ROUTINE GENERAL MEDICAL EXAMINATION AT A HEALTH CARE FACILITY: ICD-10-CM

## 2019-01-15 DIAGNOSIS — Z12.31 ENCOUNTER FOR SCREENING MAMMOGRAM FOR BREAST CANCER: ICD-10-CM

## 2019-01-15 DIAGNOSIS — E66.01 MORBID OBESITY (H): ICD-10-CM

## 2019-01-15 DIAGNOSIS — I10 BENIGN ESSENTIAL HYPERTENSION: ICD-10-CM

## 2019-01-15 PROCEDURE — 99396 PREV VISIT EST AGE 40-64: CPT | Performed by: FAMILY MEDICINE

## 2019-01-15 SDOH — HEALTH STABILITY: MENTAL HEALTH: HOW OFTEN DO YOU HAVE A DRINK CONTAINING ALCOHOL?: NEVER

## 2019-01-15 ASSESSMENT — ANXIETY QUESTIONNAIRES
7. FEELING AFRAID AS IF SOMETHING AWFUL MIGHT HAPPEN: NOT AT ALL
6. BECOMING EASILY ANNOYED OR IRRITABLE: SEVERAL DAYS
GAD7 TOTAL SCORE: 1
3. WORRYING TOO MUCH ABOUT DIFFERENT THINGS: NOT AT ALL
1. FEELING NERVOUS, ANXIOUS, OR ON EDGE: NOT AT ALL
2. NOT BEING ABLE TO STOP OR CONTROL WORRYING: NOT AT ALL
5. BEING SO RESTLESS THAT IT IS HARD TO SIT STILL: NOT AT ALL

## 2019-01-15 ASSESSMENT — MIFFLIN-ST. JEOR: SCORE: 1579.06

## 2019-01-15 ASSESSMENT — PATIENT HEALTH QUESTIONNAIRE - PHQ9
SUM OF ALL RESPONSES TO PHQ QUESTIONS 1-9: 0
5. POOR APPETITE OR OVEREATING: NOT AT ALL

## 2019-01-15 NOTE — LETTER
My Depression Action Plan  Name: Salina Khan   Date of Birth 1965  Date: 1/15/2019    My doctor: Melody Barnard   My clinic: [facility] [address] [place] [address] [phone]          GREEN    ZONE   Good Control    What it looks like:     Things are going generally well. You have normal up s and down s. You may even feel depressed from time to time, but bad moods usually last less than a day.   What you need to do:  1. Continue to care for yourself (see self care plan)  2. Check your depression survival kit and update it as needed  3. Follow your physician s recommendations including any medication.  4. Do not stop taking medication unless you consult with your physician first.           YELLOW         ZONE Getting Worse    What it looks like:     Depression is starting to interfere with your life.     It may be hard to get out of bed; you may be starting to isolate yourself from others.    Symptoms of depression are starting to last most all day and this has happened for several days.     You may have suicidal thoughts but they are not constant.   What you need to do:     1. Call your care team, your response to treatment will improve if you keep your care team informed of your progress. Yellow periods are signs an adjustment may need to be made.     2. Continue your self-care, even if you have to fake it!    3. Talk to someone in your support network    4. Open up your depression survival kit           RED    ZONE Medical Alert - Get Help    What it looks like:     Depression is seriously interfering with your life.     You may experience these or other symptoms: You can t get out of bed most days, can t work or engage in other necessary activities, you have trouble taking care of basic hygiene, or basic responsibilities, thoughts of suicide or death that will not go away, self-injurious behavior.     What you need to do:  1. Call your care team and  request a same-day appointment. If they are not available (weekends or after hours) call your local crisis line, emergency room or 911.            Depression Self Care Plan / Survival Kit    Self-Care for Depression  Here s the deal. Your body and mind are really not as separate as most people think.  What you do and think affects how you feel and how you feel influences what you do and think. This means if you do things that people who feel good do, it will help you feel better.  Sometimes this is all it takes.  There is also a place for medication and therapy depending on how severe your depression is, so be sure to consult with your medical provider and/ or Behavioral Health Consultant if your symptoms are worsening or not improving.     In order to better manage my stress, I will:    Exercise  Get some form of exercise, every day. This will help reduce pain and release endorphins, the  feel good  chemicals in your brain. This is almost as good as taking antidepressants!  This is not the same as joining a gym and then never going! (they count on that by the way ) It can be as simple as just going for a walk or doing some gardening, anything that will get you moving.      Hygiene   Maintain good hygiene (Get out of bed in the morning, Make your bed, Brush your teeth, Take a shower, and Get dressed like you were going to work, even if you are unemployed).  If your clothes don't fit try to get ones that do.    Diet  I will strive to eat foods that are good for me, drink plenty of water, and avoid excessive sugar, caffeine, alcohol, and other mood-altering substances.  Some foods that are helpful in depression are: complex carbohydrates, B vitamins, flaxseed, fish or fish oil, fresh fruits and vegetables.    Psychotherapy  I agree to participate in Individual Therapy (if recommended).    Medication  If prescribed medications, I agree to take them.  Missing doses can result in serious side effects.  I understand that  drinking alcohol, or other illicit drug use, may cause potential side effects.  I will not stop my medication abruptly without first discussing it with my provider.    Staying Connected With Others  I will stay in touch with my friends, family members, and my primary care provider/team.    Use your imagination  Be creative.  We all have a creative side; it doesn t matter if it s oil painting, sand castles, or mud pies! This will also kick up the endorphins.    Witness Beauty  (AKA stop and smell the roses) Take a look outside, even in mid-winter. Notice colors, textures. Watch the squirrels and birds.     Service to others  Be of service to others.  There is always someone else in need.  By helping others we can  get out of ourselves  and remember the really important things.  This also provides opportunities for practicing all the other parts of the program.    Humor  Laugh and be silly!  Adjust your TV habits for less news and crime-drama and more comedy.    Control your stress  Try breathing deep, massage therapy, biofeedback, and meditation. Find time to relax each day.     My support system    Clinic Contact:  Phone number:    Contact 1:  Phone number:    Contact 2:  Phone number:    Orthodoxy/:  Phone number:    Therapist:  Phone number:    Local crisis center:    Phone number:    Other community support:  Phone number:

## 2019-01-15 NOTE — PROGRESS NOTES
SUBJECTIVE:   CC: Salina Khan is an 53 year old woman who presents for preventive health visit.     Healthy Habits:    Do you get at least three servings of calcium containing foods daily (dairy, green leafy vegetables, etc.)? yes    Amount of exercise or daily activities, outside of work: 2 day(s) per week, belongs to [employer], working on getting more activity    Problems taking medications regularly No    Medication side effects: No    Have you had an eye exam in the past two years? no    Do you see a dentist twice per year? no    Do you have sleep apnea, excessive snoring or daytime drowsiness?no          Today's PHQ-2 Score:   PHQ-2 ( 1999 Pfizer) 1/15/2019 6/30/2017   Q1: Little interest or pleasure in doing things 0 1   Q2: Feeling down, depressed or hopeless 0 1   PHQ-2 Score 0 2       Abuse: Current or Past(Physical, Sexual or Emotional)- No  Do you feel safe in your environment? Yes    Social History     Tobacco Use     Smoking status: Never Smoker     Smokeless tobacco: Never Used   Substance Use Topics     Alcohol use: No     Alcohol/week: 0.0 oz     Frequency: Never     Comment: in recovery from alcoholism, attending AA daily and in an outpatient  evening program 3 nights weekly.     If you drink alcohol do you typically have >3 drinks per day or >7 drinks per week? Not Applicable                     Reviewed orders with patient.  Reviewed health maintenance and updated orders accordingly - Yes  Labs reviewed in EPIC  BP Readings from Last 3 Encounters:   01/15/19 128/86   12/27/18 152/88   07/18/18 152/84    Wt Readings from Last 3 Encounters:   01/15/19 101.8 kg (224 lb 6.4 oz)   12/27/18 102.5 kg (226 lb)   07/18/18 100.2 kg (221 lb)                  Patient Active Problem List   Diagnosis     Gestational diabetes     Health Care Home     Benign essential hypertension     Non morbid obesity due to excess calories     Recurrent major depressive disorder, in full remission (H)     Small  bowel obstruction (H)     Morbid obesity (H)     Alcoholism in remission (H)     Past Surgical History:   Procedure Laterality Date     C/SECTION, CLASSICAL      , Classical     TUBAL LIGATION         Social History     Tobacco Use     Smoking status: Never Smoker     Smokeless tobacco: Never Used   Substance Use Topics     Alcohol use: No     Alcohol/week: 0.0 oz     Frequency: Never     Comment: in recovery from alcoholism, attending AA daily and in an outpatient  evening program 3 nights weekly.     Family History   Problem Relation Age of Onset     Hypertension Mother      Congenital Anomalies Father         cystic fibrosis  age 70's     Diabetes Father          Current Outpatient Medications   Medication Sig Dispense Refill     acetaminophen (TYLENOL) 500 MG tablet Take 1,000 mg by mouth every 6 hours as needed        calcium citrate (CALCITRATE) 950 MG tablet Take 1 tablet by mouth 3 times daily       CYANOCOBALAMIN SL Place 2,500 mcg under the tongue daily Patient is unsure of dose but 2500 mcg is listed in Care Everywhere       Iron-Vitamin C (VITRON-C PO) Take 1 tablet by mouth daily       lisinopril (PRINIVIL/ZESTRIL) 20 MG tablet Take 1 tablet (20 mg) by mouth daily 90 tablet 3     multivitamin  peds with C and FA (FLINTSTONES/MY FIRST) CHEW Take 1 tablet by mouth 2 times daily       naltrexone (DEPADE/REVIA) 50 MG tablet Take 1 tablet (50 mg) by mouth daily 90 tablet 3     VALACYCLOVIR HCL PO Take 1,000 mg by mouth 2 times daily as needed       venlafaxine (EFFEXOR-XR) 75 MG 24 hr capsule Take 1 capsule (75 mg) by mouth daily 90 capsule 3     VITAMIN D, CHOLECALCIFEROL, PO Take 1,000 Units by mouth 2 times daily       Allergies   Allergen Reactions     Erythromycin Swelling     Other reaction(s): Edema  topical EES: eye swelling  Eye drops        Nsaids Other (See Comments)     Other reaction(s): Other - Describe In Comment Field  Patient had bariatric surgery. Should not ever take NSAIDS  due to high risk for gastric ulcers.   Avoid because of gastric by pass surgery     Recent Labs   Lab Test 18  0850 18  0705 18  0343 17  0931 10/27/15  0939   LDL 68  --   --  84 61   HDL 88  --   --  63 70   TRIG 74  --   --  112 91   ALT 15  --  24  --  16   CR 0.69 0.68 0.62 0.74 0.65   GFRESTIMATED  --  >90 >90  --   --    GFRESTBLACK  --  >90 >90  --   --    POTASSIUM 4.5 3.5 4.0 4.3 4.5        Mammogram Screening: Patient over age 50, mutual decision to screen reflected in health maintenance.    Pertinent mammograms are reviewed under the imaging tab.  History of abnormal Pap smear: NO - age 30-65 PAP every 5 years with negative HPV co-testing recommended  PAP / HPV Latest Ref Rng & Units 10/27/2015   HPV HIGH RISK Negative Negative   HPV, LOW RISK Negative Negative     Reviewed and updated as needed this visit by clinical staff  Tobacco  Allergies  Meds  Soc Hx        Reviewed and updated as needed this visit by Provider  Tobacco  Soc Hx       Past Medical History:   Diagnosis Date     Anxiety      Depressive disorder      Gestational diabetes       x 1     H/O: depression [id number]      Past Surgical History:   Procedure Laterality Date     C/SECTION, CLASSICAL      , Classical     TUBAL LIGATION       Obstetric History       T0      L2     SAB0   TAB0   Ectopic0   Multiple0   Live Births0       # Outcome Date GA Lbr Dontrell/2nd Weight Sex Delivery Anes PTL Lv   2 Para            1 Para                   ROS:  CONSTITUTIONAL: NEGATIVE for fever, chills, change in weight  INTEGUMENTARY/SKIN: NEGATIVE for worrisome rashes, moles or lesions  EYES: NEGATIVE for vision changes or irritation  ENT: NEGATIVE for ear, mouth and throat problems  RESP: NEGATIVE for significant cough or SOB  BREAST: NEGATIVE for masses, tenderness or discharge  CV: NEGATIVE for chest pain, palpitations or peripheral edema  GI: NEGATIVE for nausea, abdominal pain, heartburn, or change in  "bowel habits  : NEGATIVE for unusual urinary or vaginal symptoms. No vaginal bleeding.  MUSCULOSKELETAL: NEGATIVE for significant arthralgias or myalgia  NEURO: NEGATIVE for weakness, dizziness or paresthesias  PSYCHIATRIC: NEGATIVE for changes in mood or affect     OBJECTIVE:   /86   Pulse 60   Resp 16   Ht 1.588 m (5' 2.5\")   Wt 101.8 kg (224 lb 6.4 oz)   LMP 10/09/2015 (Approximate)   SpO2 95%   BMI 40.39 kg/m    EXAM:  GENERAL APPEARANCE: healthy, alert and no distress  EYES: Eyes grossly normal to inspection, PERRL and conjunctivae and sclerae normal  HENT: ear canals and TM's normal, nose and mouth without ulcers or lesions, oropharynx clear and oral mucous membranes moist  NECK: no adenopathy, no asymmetry, masses, or scars and thyroid normal to palpation  RESP: lungs clear to auscultation - no rales, rhonchi or wheezes  BREAST: normal without masses, tenderness or nipple discharge and no palpable axillary masses or adenopathy  CV: regular rate and rhythm, normal S1 S2, no S3 or S4, no murmur, click or rub, no peripheral edema and peripheral pulses strong  ABDOMEN: soft, nontender, no hepatosplenomegaly, no masses and bowel sounds normal  MS: no musculoskeletal defects are noted and gait is age appropriate without ataxia  SKIN: no suspicious lesions or rashes  NEURO: Normal strength and tone, sensory exam grossly normal, mentation intact and speech normal  PSYCH: mentation appears normal and affect normal/bright        ASSESSMENT/PLAN:   Salina was seen today for physical and recheck medication.    Diagnoses and all orders for this visit:    Routine general medical examination at a health care facility  Salina is a healthy appearing 53 year old female. Imms are UTD, recommend Shigrix, BSE reviewed, mammo referral given.     Encounter for screening mammogram for breast cancer  -     MAMMO -  Screening Digital Bilateral (FUTURE/[facility]); Future    Morbid obesity (H)  Continue healthy eating " "and regular exercise. She remains a stable weight post gastric bypass surgery.    Benign essential hypertension  Continue lisinopril.    Recurrent major depressive disorder, in full remission (H)  Continue venlafaxine.    Alcoholism in remission (H)    Other orders  -     DEPRESSION ACTION PLAN (DAP)        COUNSELING:   Reviewed preventive health counseling, as reflected in patient instructions       Regular exercise       Healthy diet/nutrition       Vision screening       Hearing screening       Osteoporosis Prevention/Bone Health       Colon cancer screening    BP Readings from Last 1 Encounters:   01/15/19 128/86     Estimated body mass index is 40.39 kg/m  as calculated from the following:    Height as of this encounter: 1.588 m (5' 2.5\").    Weight as of this encounter: 101.8 kg (224 lb 6.4 oz).      Weight management plan: Discussed healthy diet and exercise guidelines     reports that  has never smoked. she has never used smokeless tobacco.      Counseling Resources:  ATP IV Guidelines  Pooled Cohorts Equation Calculator  Breast Cancer Risk Calculator  FRAX Risk Assessment  ICSI Preventive Guidelines  Dietary Guidelines for Americans, 2010  USDA's MyPlate  ASA Prophylaxis  Lung CA Screening    Melody Barnard MD  [facility]  "

## 2019-01-16 ASSESSMENT — ANXIETY QUESTIONNAIRES: GAD7 TOTAL SCORE: 1

## 2019-04-19 ENCOUNTER — HEALTH MAINTENANCE LETTER (OUTPATIENT)
Age: 54
End: 2019-04-19

## 2019-08-19 DIAGNOSIS — F10.20 ALCOHOLISM (H): ICD-10-CM

## 2019-08-19 RX ORDER — NALTREXONE HYDROCHLORIDE 50 MG/1
50 TABLET, FILM COATED ORAL DAILY
Qty: 90 TABLET | Refills: 1 | Status: SHIPPED | OUTPATIENT
Start: 2019-08-19 | End: 2020-07-16

## 2019-09-27 ENCOUNTER — HEALTH MAINTENANCE LETTER (OUTPATIENT)
Age: 54
End: 2019-09-27

## 2020-02-05 DIAGNOSIS — F33.42 RECURRENT MAJOR DEPRESSIVE DISORDER, IN FULL REMISSION (H): ICD-10-CM

## 2020-02-05 DIAGNOSIS — F41.1 GAD (GENERALIZED ANXIETY DISORDER): ICD-10-CM

## 2020-02-06 RX ORDER — VENLAFAXINE HYDROCHLORIDE 75 MG/1
75 CAPSULE, EXTENDED RELEASE ORAL DAILY
Qty: 30 CAPSULE | Refills: 1 | Status: SHIPPED | OUTPATIENT
Start: 2020-02-06 | End: 2020-04-03

## 2020-03-15 ENCOUNTER — HEALTH MAINTENANCE LETTER (OUTPATIENT)
Age: 55
End: 2020-03-15

## 2020-03-16 ENCOUNTER — VIRTUAL VISIT (OUTPATIENT)
Dept: FAMILY MEDICINE | Facility: OTHER | Age: 55
End: 2020-03-16

## 2020-03-17 ENCOUNTER — OFFICE VISIT (OUTPATIENT)
Dept: FAMILY MEDICINE | Facility: CLINIC | Age: 55
End: 2020-03-17

## 2020-03-17 VITALS
OXYGEN SATURATION: 98 % | SYSTOLIC BLOOD PRESSURE: 134 MMHG | HEIGHT: 62 IN | WEIGHT: 219 LBS | RESPIRATION RATE: 18 BRPM | BODY MASS INDEX: 40.3 KG/M2 | DIASTOLIC BLOOD PRESSURE: 92 MMHG | HEART RATE: 109 BPM | TEMPERATURE: 98.8 F

## 2020-03-17 DIAGNOSIS — I10 BENIGN ESSENTIAL HYPERTENSION: ICD-10-CM

## 2020-03-17 DIAGNOSIS — F33.42 RECURRENT MAJOR DEPRESSIVE DISORDER, IN FULL REMISSION (H): ICD-10-CM

## 2020-03-17 DIAGNOSIS — J45.21 MILD INTERMITTENT REACTIVE AIRWAY DISEASE WITH ACUTE EXACERBATION: Primary | ICD-10-CM

## 2020-03-17 PROCEDURE — 99214 OFFICE O/P EST MOD 30 MIN: CPT | Performed by: FAMILY MEDICINE

## 2020-03-17 RX ORDER — LISINOPRIL 20 MG/1
20 TABLET ORAL DAILY
Qty: 90 TABLET | Refills: 1 | Status: SHIPPED | OUTPATIENT
Start: 2020-03-17 | End: 2020-07-16

## 2020-03-17 RX ORDER — SULFAMETHOXAZOLE/TRIMETHOPRIM 800-160 MG
1 TABLET ORAL 2 TIMES DAILY
Qty: 20 TABLET | Refills: 0 | Status: SHIPPED | OUTPATIENT
Start: 2020-03-17 | End: 2020-10-16

## 2020-03-17 ASSESSMENT — ANXIETY QUESTIONNAIRES
IF YOU CHECKED OFF ANY PROBLEMS ON THIS QUESTIONNAIRE, HOW DIFFICULT HAVE THESE PROBLEMS MADE IT FOR YOU TO DO YOUR WORK, TAKE CARE OF THINGS AT HOME, OR GET ALONG WITH OTHER PEOPLE: SOMEWHAT DIFFICULT
6. BECOMING EASILY ANNOYED OR IRRITABLE: SEVERAL DAYS
3. WORRYING TOO MUCH ABOUT DIFFERENT THINGS: SEVERAL DAYS
7. FEELING AFRAID AS IF SOMETHING AWFUL MIGHT HAPPEN: SEVERAL DAYS
2. NOT BEING ABLE TO STOP OR CONTROL WORRYING: NOT AT ALL
5. BEING SO RESTLESS THAT IT IS HARD TO SIT STILL: SEVERAL DAYS
1. FEELING NERVOUS, ANXIOUS, OR ON EDGE: SEVERAL DAYS
GAD7 TOTAL SCORE: 5

## 2020-03-17 ASSESSMENT — MIFFLIN-ST. JEOR: SCORE: 1541.63

## 2020-03-17 ASSESSMENT — PATIENT HEALTH QUESTIONNAIRE - PHQ9
5. POOR APPETITE OR OVEREATING: NOT AT ALL
SUM OF ALL RESPONSES TO PHQ QUESTIONS 1-9: 9

## 2020-03-17 NOTE — PATIENT INSTRUCTIONS
Please stay at home until no fever for 48 hours and no fever reducing medicine and no cough for same time frame.

## 2020-03-17 NOTE — PROGRESS NOTES
"Date: 2020 18:29:42  Clinician: Rosa Maria Ulloa  Clinician NPI: [id number]  Patient: Salina Khan  Patient : 1965  Patient Address: [address] , [place] [address]  Patient Phone: [phone]  Visit Protocol: URI  Patient Summary:  Salina is a 54 year old ( : 1965 ) female who initiated a Visit for cold, sinus infection, or influenza. When asked the question \"Please sign me up to receive news, health information and promotions from [employer].\", Salina responded \"No\".    Salina states her symptoms started gradually 2-3 weeks ago. After her symptoms started, they improved and then got worse again.   Her symptoms consist of malaise, a headache, rhinitis, myalgia, wheezing, a cough, nasal congestion, and chills.   Symptom details     Nasal secretions: The color of her mucus is clear and white.    Cough: Salina coughs every 5-10 minutes and her cough is not more bothersome at night. Phlegm comes into her throat when she coughs. She does not believe her cough is caused by post-nasal drip. The color of the phlegm is white and clear.     Wheezing: Salina has been diagnosed with asthma. The wheezing interferes with her normal daily activities.    Headache: She states the headache is moderate (4-6 on a 10 point pain scale).      Salina denies having ear pain, facial pain or pressure, sore throat, teeth pain, and fever. She also denies having recent facial or sinus surgery in the past 60 days, taking antibiotic medication for the symptoms, and having a sinus infection within the past year. She is not experiencing dyspnea.   Precipitating events  She has not recently been exposed to someone with influenza. Salina has not been in close contact with any high risk individuals.   Pertinent COVID-19 (Coronavirus) information  Salina has not traveled internationally or to the areas where COVID-19 (Coronavirus) is widespread in the last 14 days before the start of her symptoms.   Salina has not had close contact " with a suspected or laboratory-confirmed COVID-19 patient within 14 days of symptom onset.   Salina is not a healthcare worker and does not work in a healthcare facility.   Triage Point(s) temporarily suspended for COVID-19 (Coronavirus) screening  Salina reported the following symptoms which were previously protocol referral points. These protocol referral points have temporarily been removed for purposes of COVID-19 (Coronavirus) screening.   Wheezing that keeps Salina from doing daily activities   Pertinent medical history  Salina typically gets a yeast infection when she takes antibiotics. She has used fluconazole (Diflucan) to treat previous yeast infections. 2 doses of fluconazole (Diflucan) has typically been needed for symptoms to resolve in the past.  Salina needs a return to work/school note.   Weight: 230 lbs   Salina does not smoke or use smokeless tobacco.   Weight: 230 lbs    MEDICATIONS: venlafaxine oral, ALLERGIES: erythromycin base  Clinician Response:  Dear Salina,  I am sorry you are not feeling well. To determine the most appropriate care for you, I would like you to be seen in person to further discuss your health history and symptoms.  You will not be charged for this Visit. Thank you for trusting us with your care.  COVID-19 (Coronavirus) General Information  With the increase in the number of COVID-19 (Coronavirus) cases, we understand you may have some questions. Below is some helpful information on COVID-19 (Coronavirus).  How can I protect myself and others from the COVID-19 (Coronavirus)?  Because there is currently no vaccine to prevent infection, the best way to protect yourself is to avoid being exposed to this virus. Put distance between yourself and other people if COVID-19 (Coronavirus) is spreading in your community. The virus is thought to spread mainly from person-to-person.     Between people who are in close contact with one another (within about 6 about) for prolonged period (10  minutes or longer).    Through respiratory droplets produced when an infected person coughs or sneezes.     The CDC recommends the following additional steps to protect yourself and others:     Wash your hands often with soap and water for at least 20 seconds, especially after blowing your nose, coughing, or sneezing; going to the bathroom; and before eating or preparing food.  Use an alcohol-based hand  that contains at least 60 percent alcohol if soap and water are not available.        Avoid touching your eyes, nose and mouth with unwashed hands.    Avoid close contact with people who are sick.    Stay home when you are sick.    Cover your cough or sneeze with a tissue, then throw the tissue in the trash.    Clean and disinfect frequently touched objects and surfaces.     You can help stop COVID-19 (Coronavirus) by knowing the signs and symptoms:     Fever    Cough    Shortness of breath     Contact your healthcare provider if   Develop symptoms   AND   Have been in close contact with a person known to have COVID-19 (Coronavirus) or live in or have recently traveled from an area with ongoing spread of COVID-19 (Coronavirus). Call ahead before you go to a doctor's office or emergency room. Tell them about your recent travel and your symptoms.   For the most up to date information, visit the CDC's website.  Steps to help prevent the spread of COVID-19 (Coronavirus) if you are sick  If you are sick with COVID-19 (Coronavirus) or suspect you are infected with the virus that causes COVID-19 (Coronavirus), follow the steps below to help prevent the disease from spreading&nbsp;to people in your home and community.     Stay home except to get medical care. Home isolation may be started in consultation with your healthcare clinician.    Separate yourself from other people and animals in your home.    Call ahead before visiting your doctor if you have a medical appointment.    Wear a facemask when you are around  "other people.    Cover your cough and sneezes.    Clean your hands often.    Avoid sharing personal household items.    Clean and disinfect frequently touched objects and surfaces everyday.    You will need to have someone drop off medications or household supplies (if needed) at your house without coming inside or in contact with you or others living in your house.    Monitor your symptoms and seek prompt medical care if your illness is worsening (e.g. Difficulty breathing).    Discontinue home isolation only in consultation with your healthcare provider.     For more detailed and up to date information on what to do if you are sick, visit this link: What to Do If You Are Sick With Coronavirus Disease 2019 (COVID-19).  Do I need to be tested for COVID-19 (Coronavirus)?     At this time, the limited number of tests available are controlled by the state and local health departments and are being reserved for more seriously ill patients, those with known exposure to confirmed patients, and those with recent travel (within 14 days) to countries with high rates of COVID-19 (Coronavirus).    Decisions on which patients receive testing will be based on the local spread of COVID-19 (Coronavirus) as well as the symptoms. Your healthcare provider will make the final decision on whether you should be tested.    In the meantime, if you have concerns that you may have been exposed, it is reasonable to practice \"social distancing.\"&nbsp; If you are ill with a cold or flu-like illness, please monitor your symptoms and reach out to your healthcare provider if your symptoms worsen.    For more up to date information, visit this link: COVID-19 (Coronavirus) Frequently Asked Questions and Answers.      Diagnosis: Refer for additional evaluation  Diagnosis ICD: R69  "

## 2020-03-18 ASSESSMENT — ANXIETY QUESTIONNAIRES: GAD7 TOTAL SCORE: 5

## 2020-04-02 DIAGNOSIS — F41.1 GAD (GENERALIZED ANXIETY DISORDER): ICD-10-CM

## 2020-04-02 DIAGNOSIS — F33.42 RECURRENT MAJOR DEPRESSIVE DISORDER, IN FULL REMISSION (H): ICD-10-CM

## 2020-04-03 RX ORDER — VENLAFAXINE HYDROCHLORIDE 75 MG/1
CAPSULE, EXTENDED RELEASE ORAL
Qty: 30 CAPSULE | Refills: 1 | Status: SHIPPED | OUTPATIENT
Start: 2020-04-03 | End: 2020-05-26

## 2020-04-03 NOTE — TELEPHONE ENCOUNTER
VENLAFAXINE   LOV 3/17/2020  LAST PHQ-9 3/17/2020 SCORE 9    PHQ 12/27/2018 1/15/2019 3/17/2020   PHQ-9 Total Score 4 0 9   Q9: Thoughts of better off dead/self-harm past 2 weeks Not at all Not at all Not at all

## 2020-04-13 ENCOUNTER — TELEPHONE (OUTPATIENT)
Dept: FAMILY MEDICINE | Facility: CLINIC | Age: 55
End: 2020-04-13

## 2020-04-13 NOTE — LETTER
[facility] [address] [place] [id number] [phone]      2020    TO:  [facility]     RE: Salina Khan   [address] [facility] [address]   : 1965    Salina Khan was seen in my office on 3/17/20 for an asthma exacerbation and cough. She was treated with a 10 day course of antibiotics. In speaking with her today, she reports she has recovered fully and denies any symptoms of respiratory illness at this time.     Sincerely,         Devin Beebe MD

## 2020-04-13 NOTE — TELEPHONE ENCOUNTER
Patient calls stating plans to enter [facility] for alcohol treatment and they are requesting a note from Dr. Beebe regarding her March 17 2020 visit for asthma/cough. Patient reports is fully recovered and needs note documenting no concerns for COVID.   Patient today reports does not have any respiratory symptoms, fever, sore throat, SOB, cough, myalgias.     Plan: see Dr. Beebe's letter in EPIC. Patient informed and will print letter from [employer] portal.     Body of letter states:   Salina Khan was seen in my office on 3/17/20 for an asthma exacerbation and cough. She was treated with a 10 day course of antibiotics. In speaking with her today, she reports she has recovered fully and denies any symptoms of respiratory illness at this time.

## 2020-04-14 ENCOUNTER — VIRTUAL VISIT (OUTPATIENT)
Dept: FAMILY MEDICINE | Facility: CLINIC | Age: 55
End: 2020-04-14

## 2020-04-14 DIAGNOSIS — F10.20 ALCOHOL DEPENDENCE, CONTINUOUS DRINKING BEHAVIOR (H): Primary | ICD-10-CM

## 2020-04-14 PROCEDURE — 99214 OFFICE O/P EST MOD 30 MIN: CPT | Mod: GT | Performed by: FAMILY MEDICINE

## 2020-04-14 NOTE — LETTER
[facility] [address] [place] [address] [phone]      April 14, 2020      Re: Salina Khan  [address] [facility] [address]    To whom it may concern:    My patient, Salina Khan has a current severe illness that precludes work for the next two weeks at a minimum. She wishes her employer to know that her illness is not COVID-19. Her leave will be temporary as she will receive inpatient treatment for 2-4 weeks and then will almost assuredly be able to return to work. I would anticipate she will have no work restrictions at that time.        Sincerely,          Devin Beebe M.D.

## 2020-04-14 NOTE — PROGRESS NOTES
"  Problem(s) Oriented visit      Salina Khan is being evaluated via a billable video visit.      The patient has been notified of following:     \"This video visit will be conducted via a call between you and your physician/provider. We have found that certain health care needs can be provided without the need for an in-person physical exam.  This service lets us provide the care you need with a video conversation.  If a prescription is necessary we can send it directly to your pharmacy.  If lab work is needed we can place an order for that and you can then stop by our lab to have the test done at a later time.    If during the course of the call the physician/provider feels a video visit is not appropriate, you will not be charged for this service.\"     Physician has received verbal consent for a Video Visit from the patient? Yes    SUBJECTIVE:                                                    Subjective     Salina Khan is a 54 year old female who presents to clinic today for the following health issues:    HPI     1. Alcohol dependence, continuous drinking behavior (H)  Currently heading into treatment for alcoholism.   Has talked with Anna and their assesment is that she needs residential treatment.  Drinking about a pint of whiskey per day.  Had withdrawal symptoms on .  All started with bariatric surgery in        Histories:   Patient Active Problem List   Diagnosis     Gestational diabetes     Health Care Home     Benign essential hypertension     Non morbid obesity due to excess calories     Recurrent major depressive disorder, in full remission (H)     Small bowel obstruction (H)     Morbid obesity (H)     Past Surgical History:   Procedure Laterality Date     C/SECTION, CLASSICAL      , Classical     TUBAL LIGATION         Social History     Tobacco Use     Smoking status: Never Smoker     Smokeless tobacco: Never Used   Substance Use Topics     Alcohol use: No     " "Alcohol/week: 0.0 standard drinks     Frequency: Never     Comment: in recovery from alcoholism, attending AA daily and in an outpatient  evening program 3 nights weekly.     Family History   Problem Relation Age of Onset     Hypertension Mother      Congenital Anomalies Father         cystic fibrosis  age 70's     Diabetes Father            Reviewed and updated as needed this visit by Provider         ROS:  General and CV completed and negative except as noted above          OBJECTIVE:                                                      Objective    LMP 10/09/2015 (Approximate)   Estimated body mass index is 40.06 kg/m  as calculated from the following:    Height as of 3/17/20: 1.575 m (5' 2\").    Weight as of 3/17/20: 99.3 kg (219 lb).    Physical Exam   APPEARANCE: = Relaxed and in no distress  Resp effort = Calm regular breathing  Recent/Remote Memory = Alert and Oriented x 3  Mood/Affect = Cooperative and interested            ASSESSMENT/PLAN:                                                        There are no Patient Instructions on file for this visit.  Salina was seen today for consult.    Diagnoses and all orders for this visit:    Alcohol dependence, continuous drinking behavior (H)      >25 min spent with patient, greater than 50% spent on discussion/education/planning, filling out forms and letters., etc. About The encounter diagnosis was Alcohol dependence, continuous drinking behavior (H).    Regular exercise    Video-Visit Details    Type of service:  Video Visit  Originating Location (pt. Location): Home  Distant Location (provider location):  [facility]   Mode of Communication:  Video Conference via [employer]      The following health maintenance items are reviewed in Epic and correct as of today:  Health Maintenance   Topic Date Due     HEPATITIS C SCREENING  1965     ASTHMA ACTION PLAN  1965     ASTHMA CONTROL TEST  1965     ADVANCE CARE PLANNING  1965 "     MAMMO SCREENING  1965     COLORECTAL CANCER SCREENING  08/06/1974     HIV SCREENING  08/06/1979     PNEUMOCOCCAL IMMUNIZATION 19-64 MEDIUM RISK (1 of 1 - PPSV23) 08/06/1983     ZOSTER IMMUNIZATION (1 of 2) 08/06/2014     PAP  10/27/2018     INFLUENZA VACCINE (1) 09/01/2019     PREVENTIVE CARE VISIT  01/15/2020     DTAP/TDAP/TD IMMUNIZATION (3 - Td) 01/26/2020     JASON ASSESSMENT  09/17/2020     PHQ-9  09/17/2020     LIPID  12/27/2023     DEPRESSION ACTION PLAN  Completed     IPV IMMUNIZATION  Aged Out     MENINGITIS IMMUNIZATION  Aged Out       Devin Beebe MD  [facility]  Family Practice  [facility]  [phone]    For any issues my office # is [phone]

## 2020-05-26 DIAGNOSIS — F33.42 RECURRENT MAJOR DEPRESSIVE DISORDER, IN FULL REMISSION (H): ICD-10-CM

## 2020-05-26 DIAGNOSIS — F41.1 GAD (GENERALIZED ANXIETY DISORDER): ICD-10-CM

## 2020-05-26 NOTE — TELEPHONE ENCOUNTER
Venlafaxine. LOV VV 4/14/20. Labs 2/27/20.   Note      VENLAFAXINE   LOV 3/17/2020  LAST PHQ-9 3/17/2020 SCORE 9     PHQ 12/27/2018 1/15/2019 3/17/2020   PHQ-9 Total Score 4 0 9   Q9: Thoughts of better off dead/self-harm past 2 weeks Not at all Not at all Not at all

## 2020-05-27 RX ORDER — VENLAFAXINE HYDROCHLORIDE 75 MG/1
CAPSULE, EXTENDED RELEASE ORAL
Qty: 30 CAPSULE | Refills: 1 | Status: SHIPPED | OUTPATIENT
Start: 2020-05-27 | End: 2020-07-19

## 2020-07-16 DIAGNOSIS — I10 BENIGN ESSENTIAL HYPERTENSION: ICD-10-CM

## 2020-07-16 DIAGNOSIS — F10.20 ALCOHOLISM (H): ICD-10-CM

## 2020-07-16 RX ORDER — NALTREXONE HYDROCHLORIDE 50 MG/1
TABLET, FILM COATED ORAL
Qty: 90 TABLET | Refills: 1 | Status: SHIPPED | OUTPATIENT
Start: 2020-07-16 | End: 2020-10-16

## 2020-07-16 RX ORDER — LISINOPRIL 20 MG/1
TABLET ORAL
Qty: 90 TABLET | Refills: 1 | Status: SHIPPED | OUTPATIENT
Start: 2020-07-16 | End: 2021-04-22

## 2020-07-17 DIAGNOSIS — F33.42 RECURRENT MAJOR DEPRESSIVE DISORDER, IN FULL REMISSION (H): ICD-10-CM

## 2020-07-17 DIAGNOSIS — F41.1 GAD (GENERALIZED ANXIETY DISORDER): ICD-10-CM

## 2020-07-19 RX ORDER — VENLAFAXINE HYDROCHLORIDE 75 MG/1
CAPSULE, EXTENDED RELEASE ORAL
Qty: 30 CAPSULE | Refills: 0 | Status: SHIPPED | OUTPATIENT
Start: 2020-07-19 | End: 2020-08-24

## 2020-07-20 NOTE — TELEPHONE ENCOUNTER
Will need to be seen to est care with a new provider, please offer virtual visit. No refills until seen LM TO CALL CLINIC TO SCHEDULE

## 2020-07-31 DIAGNOSIS — F41.1 GAD (GENERALIZED ANXIETY DISORDER): ICD-10-CM

## 2020-07-31 DIAGNOSIS — F33.42 RECURRENT MAJOR DEPRESSIVE DISORDER, IN FULL REMISSION (H): ICD-10-CM

## 2020-07-31 NOTE — TELEPHONE ENCOUNTER
VENLAFAXINE  LOV (VV) 4/14/2020    PER LAST REFILL NOTE: DUE FOR OV.     PHQ 12/27/2018 1/15/2019 3/17/2020   PHQ-9 Total Score 4 0 9   Q9: Thoughts of better off dead/self-harm past 2 weeks Not at all Not at all Not at all

## 2020-08-03 RX ORDER — VENLAFAXINE HYDROCHLORIDE 75 MG/1
CAPSULE, EXTENDED RELEASE ORAL
Qty: 30 CAPSULE | Refills: 0 | OUTPATIENT
Start: 2020-08-03

## 2020-08-03 NOTE — TELEPHONE ENCOUNTER
7/31/20 It was realized this was sent to the wrong pharmacy.  Rx called into [facility] mail order.    Seven Rodriguez,   [facility]  [phone]

## 2020-08-24 DIAGNOSIS — F41.1 GAD (GENERALIZED ANXIETY DISORDER): ICD-10-CM

## 2020-08-24 DIAGNOSIS — F33.42 RECURRENT MAJOR DEPRESSIVE DISORDER, IN FULL REMISSION (H): ICD-10-CM

## 2020-08-24 RX ORDER — VENLAFAXINE HYDROCHLORIDE 75 MG/1
CAPSULE, EXTENDED RELEASE ORAL
Qty: 30 CAPSULE | Refills: 0 | Status: SHIPPED | OUTPATIENT
Start: 2020-08-24 | End: 2020-09-22

## 2020-09-22 DIAGNOSIS — F41.1 GAD (GENERALIZED ANXIETY DISORDER): ICD-10-CM

## 2020-09-22 DIAGNOSIS — F33.42 RECURRENT MAJOR DEPRESSIVE DISORDER, IN FULL REMISSION (H): ICD-10-CM

## 2020-09-22 RX ORDER — VENLAFAXINE HYDROCHLORIDE 75 MG/1
CAPSULE, EXTENDED RELEASE ORAL
Qty: 30 CAPSULE | Refills: 0 | Status: ON HOLD | OUTPATIENT
Start: 2020-09-22 | End: 2020-10-18

## 2020-09-22 NOTE — TELEPHONE ENCOUNTER
Virtual visit: 4/14/2020     Labs: 12/27/18    Due for labs.       Last Comprehensive Metabolic Panel:  Sodium   Date Value Ref Range Status   12/27/2018 145 (H) 134 - 144 mmol/L Final     Potassium   Date Value Ref Range Status   12/27/2018 4.5 3.5 - 5.2 mmol/L Final     Chloride   Date Value Ref Range Status   12/27/2018 104 96 - 106 mmol/L Final     Carbon Dioxide   Date Value Ref Range Status   06/26/2018 26 20 - 32 mmol/L Final     Anion Gap   Date Value Ref Range Status   06/26/2018 9 3 - 14 mmol/L Final     Glucose   Date Value Ref Range Status   12/27/2018 97 65 - 99 mg/dL Final     Urea Nitrogen   Date Value Ref Range Status   12/27/2018 14 6 - 24 mg/dL Final     BUN/Creatinine Ratio   Date Value Ref Range Status   12/27/2018 20 9 - 23 Final     Creatinine   Date Value Ref Range Status   12/27/2018 0.69 0.57 - 1.00 mg/dL Final     GFR Estimate   Date Value Ref Range Status   06/26/2018 >90 >60 mL/min/1.7m2 Final     Comment:     Non  GFR Calc     Calcium   Date Value Ref Range Status   12/27/2018 8.9 8.7 - 10.2 mg/dL Final

## 2020-09-28 PROBLEM — E66.09 NON MORBID OBESITY DUE TO EXCESS CALORIES: Status: RESOLVED | Noted: 2017-06-30 | Resolved: 2020-09-28

## 2020-10-16 ENCOUNTER — HOSPITAL ENCOUNTER (INPATIENT)
Facility: CLINIC | Age: 55
LOS: 2 days | Discharge: HOME | DRG: 641 | End: 2020-10-18
Attending: EMERGENCY MEDICINE | Admitting: INTERNAL MEDICINE

## 2020-10-16 DIAGNOSIS — F10.920 ALCOHOLIC INTOXICATION WITHOUT COMPLICATION (H): ICD-10-CM

## 2020-10-16 DIAGNOSIS — F41.1 GAD (GENERALIZED ANXIETY DISORDER): ICD-10-CM

## 2020-10-16 DIAGNOSIS — E87.20 LACTIC ACIDOSIS: ICD-10-CM

## 2020-10-16 DIAGNOSIS — F10.930 ALCOHOL WITHDRAWAL SYNDROME WITHOUT COMPLICATION (H): ICD-10-CM

## 2020-10-16 DIAGNOSIS — F43.21 ADJUSTMENT DISORDER WITH DEPRESSED MOOD: ICD-10-CM

## 2020-10-16 DIAGNOSIS — E16.2 HYPOGLYCEMIA: ICD-10-CM

## 2020-10-16 DIAGNOSIS — E87.29 ALCOHOLIC KETOACIDOSIS: ICD-10-CM

## 2020-10-16 DIAGNOSIS — F10.10 ALCOHOL ABUSE: ICD-10-CM

## 2020-10-16 DIAGNOSIS — F33.42 RECURRENT MAJOR DEPRESSIVE DISORDER, IN FULL REMISSION (H): ICD-10-CM

## 2020-10-16 PROBLEM — F10.939 ALCOHOL WITHDRAWAL (H): Status: ACTIVE | Noted: 2020-10-16

## 2020-10-16 LAB
ALBUMIN SERPL-MCNC: 3.5 G/DL (ref 3.4–5)
ALP SERPL-CCNC: 149 U/L (ref 40–150)
ALT SERPL W P-5'-P-CCNC: 38 U/L (ref 0–50)
ANION GAP SERPL CALCULATED.3IONS-SCNC: 16 MMOL/L (ref 3–14)
ANION GAP SERPL CALCULATED.3IONS-SCNC: 22 MMOL/L (ref 3–14)
AST SERPL W P-5'-P-CCNC: 48 U/L (ref 0–45)
BASOPHILS # BLD AUTO: 0 10E9/L (ref 0–0.2)
BASOPHILS NFR BLD AUTO: 0.3 %
BILIRUB SERPL-MCNC: 0.4 MG/DL (ref 0.2–1.3)
BUN SERPL-MCNC: 13 MG/DL (ref 7–30)
BUN SERPL-MCNC: 15 MG/DL (ref 7–30)
CALCIUM SERPL-MCNC: 7.5 MG/DL (ref 8.5–10.1)
CALCIUM SERPL-MCNC: 8.1 MG/DL (ref 8.5–10.1)
CHLORIDE SERPL-SCNC: 100 MMOL/L (ref 94–109)
CHLORIDE SERPL-SCNC: 106 MMOL/L (ref 94–109)
CO2 SERPL-SCNC: 16 MMOL/L (ref 20–32)
CO2 SERPL-SCNC: 18 MMOL/L (ref 20–32)
CREAT SERPL-MCNC: 0.56 MG/DL (ref 0.52–1.04)
CREAT SERPL-MCNC: 0.59 MG/DL (ref 0.52–1.04)
DIFFERENTIAL METHOD BLD: NORMAL
EOSINOPHIL # BLD AUTO: 0 10E9/L (ref 0–0.7)
EOSINOPHIL NFR BLD AUTO: 0.2 %
ERYTHROCYTE [DISTWIDTH] IN BLOOD BY AUTOMATED COUNT: 14 % (ref 10–15)
ETHANOL SERPL-MCNC: 0.3 G/DL
GFR SERPL CREATININE-BSD FRML MDRD: >90 ML/MIN/{1.73_M2}
GFR SERPL CREATININE-BSD FRML MDRD: >90 ML/MIN/{1.73_M2}
GLUCOSE BLDC GLUCOMTR-MCNC: 116 MG/DL (ref 70–99)
GLUCOSE BLDC GLUCOMTR-MCNC: 162 MG/DL (ref 70–99)
GLUCOSE BLDC GLUCOMTR-MCNC: 54 MG/DL (ref 70–99)
GLUCOSE SERPL-MCNC: 116 MG/DL (ref 70–99)
GLUCOSE SERPL-MCNC: 56 MG/DL (ref 70–99)
HCT VFR BLD AUTO: 42.4 % (ref 35–47)
HGB BLD-MCNC: 14.2 G/DL (ref 11.7–15.7)
IMM GRANULOCYTES # BLD: 0 10E9/L (ref 0–0.4)
IMM GRANULOCYTES NFR BLD: 0.2 %
KETONES BLD-SCNC: 2.9 MMOL/L (ref 0–0.6)
LACTATE BLD-SCNC: 4.6 MMOL/L (ref 0.7–2)
LACTATE BLD-SCNC: 5 MMOL/L (ref 0.7–2)
LACTATE BLD-SCNC: 6 MMOL/L (ref 0.7–2)
LACTATE BLD-SCNC: NORMAL MMOL/L (ref 0.7–2)
LYMPHOCYTES # BLD AUTO: 2.4 10E9/L (ref 0.8–5.3)
LYMPHOCYTES NFR BLD AUTO: 41 %
MCH RBC QN AUTO: 31.5 PG (ref 26.5–33)
MCHC RBC AUTO-ENTMCNC: 33.5 G/DL (ref 31.5–36.5)
MCV RBC AUTO: 94 FL (ref 78–100)
MONOCYTES # BLD AUTO: 0.5 10E9/L (ref 0–1.3)
MONOCYTES NFR BLD AUTO: 7.9 %
NEUTROPHILS # BLD AUTO: 3 10E9/L (ref 1.6–8.3)
NEUTROPHILS NFR BLD AUTO: 50.4 %
PLATELET # BLD AUTO: 241 10E9/L (ref 150–450)
POTASSIUM SERPL-SCNC: 3.8 MMOL/L (ref 3.4–5.3)
POTASSIUM SERPL-SCNC: 4.2 MMOL/L (ref 3.4–5.3)
PROT SERPL-MCNC: 7.5 G/DL (ref 6.8–8.8)
RBC # BLD AUTO: 4.51 10E12/L (ref 3.8–5.2)
SARS-COV-2 RNA SPEC QL NAA+PROBE: NORMAL
SODIUM SERPL-SCNC: 138 MMOL/L (ref 133–144)
SODIUM SERPL-SCNC: 140 MMOL/L (ref 133–144)
SPECIMEN SOURCE: NORMAL
WBC # BLD AUTO: 5.9 10E9/L (ref 4–11)

## 2020-10-16 PROCEDURE — 250N000009 HC RX 250: Performed by: EMERGENCY MEDICINE

## 2020-10-16 PROCEDURE — C9803 HOPD COVID-19 SPEC COLLECT: HCPCS

## 2020-10-16 PROCEDURE — 250N000011 HC RX IP 250 OP 636

## 2020-10-16 PROCEDURE — 96375 TX/PRO/DX INJ NEW DRUG ADDON: CPT

## 2020-10-16 PROCEDURE — 80320 DRUG SCREEN QUANTALCOHOLS: CPT | Performed by: EMERGENCY MEDICINE

## 2020-10-16 PROCEDURE — 99285 EMERGENCY DEPT VISIT HI MDM: CPT | Mod: 25

## 2020-10-16 PROCEDURE — 96376 TX/PRO/DX INJ SAME DRUG ADON: CPT

## 2020-10-16 PROCEDURE — 85025 COMPLETE CBC W/AUTO DIFF WBC: CPT | Performed by: EMERGENCY MEDICINE

## 2020-10-16 PROCEDURE — 120N000001 HC R&B MED SURG/OB

## 2020-10-16 PROCEDURE — HZ2ZZZZ DETOXIFICATION SERVICES FOR SUBSTANCE ABUSE TREATMENT: ICD-10-PCS | Performed by: INTERNAL MEDICINE

## 2020-10-16 PROCEDURE — 250N000013 HC RX MED GY IP 250 OP 250 PS 637: Performed by: EMERGENCY MEDICINE

## 2020-10-16 PROCEDURE — 99223 1ST HOSP IP/OBS HIGH 75: CPT | Mod: AI | Performed by: INTERNAL MEDICINE

## 2020-10-16 PROCEDURE — 250N000011 HC RX IP 250 OP 636: Performed by: EMERGENCY MEDICINE

## 2020-10-16 PROCEDURE — 82010 KETONE BODYS QUAN: CPT | Performed by: EMERGENCY MEDICINE

## 2020-10-16 PROCEDURE — 83605 ASSAY OF LACTIC ACID: CPT | Performed by: EMERGENCY MEDICINE

## 2020-10-16 PROCEDURE — 80053 COMPREHEN METABOLIC PANEL: CPT | Performed by: EMERGENCY MEDICINE

## 2020-10-16 PROCEDURE — 96365 THER/PROPH/DIAG IV INF INIT: CPT

## 2020-10-16 PROCEDURE — 258N000003 HC RX IP 258 OP 636: Performed by: EMERGENCY MEDICINE

## 2020-10-16 PROCEDURE — U0003 INFECTIOUS AGENT DETECTION BY NUCLEIC ACID (DNA OR RNA); SEVERE ACUTE RESPIRATORY SYNDROME CORONAVIRUS 2 (SARS-COV-2) (CORONAVIRUS DISEASE [COVID-19]), AMPLIFIED PROBE TECHNIQUE, MAKING USE OF HIGH THROUGHPUT TECHNOLOGIES AS DESCRIBED BY CMS-2020-01-R: HCPCS | Performed by: EMERGENCY MEDICINE

## 2020-10-16 PROCEDURE — 999N001017 HC STATISTIC GLUCOSE BY METER IP

## 2020-10-16 PROCEDURE — 80048 BASIC METABOLIC PNL TOTAL CA: CPT | Performed by: EMERGENCY MEDICINE

## 2020-10-16 PROCEDURE — 96361 HYDRATE IV INFUSION ADD-ON: CPT

## 2020-10-16 RX ORDER — POTASSIUM CHLORIDE 29.8 MG/ML
20 INJECTION INTRAVENOUS
Status: DISCONTINUED | OUTPATIENT
Start: 2020-10-16 | End: 2020-10-18 | Stop reason: HOSPADM

## 2020-10-16 RX ORDER — LORAZEPAM 2 MG/ML
1-2 INJECTION INTRAMUSCULAR EVERY 30 MIN PRN
Status: DISCONTINUED | OUTPATIENT
Start: 2020-10-16 | End: 2020-10-18 | Stop reason: HOSPADM

## 2020-10-16 RX ORDER — ONDANSETRON 2 MG/ML
4 INJECTION INTRAMUSCULAR; INTRAVENOUS ONCE
Status: COMPLETED | OUTPATIENT
Start: 2020-10-16 | End: 2020-10-16

## 2020-10-16 RX ORDER — LISINOPRIL 20 MG/1
20 TABLET ORAL DAILY
Status: DISCONTINUED | OUTPATIENT
Start: 2020-10-17 | End: 2020-10-18 | Stop reason: HOSPADM

## 2020-10-16 RX ORDER — DEXTROSE MONOHYDRATE, SODIUM CHLORIDE, AND POTASSIUM CHLORIDE 50; 1.49; 9 G/1000ML; G/1000ML; G/1000ML
INJECTION, SOLUTION INTRAVENOUS CONTINUOUS
Status: DISCONTINUED | OUTPATIENT
Start: 2020-10-16 | End: 2020-10-18 | Stop reason: HOSPADM

## 2020-10-16 RX ORDER — ONDANSETRON 2 MG/ML
4 INJECTION INTRAMUSCULAR; INTRAVENOUS EVERY 6 HOURS PRN
Status: DISCONTINUED | OUTPATIENT
Start: 2020-10-16 | End: 2020-10-18 | Stop reason: HOSPADM

## 2020-10-16 RX ORDER — LIDOCAINE 40 MG/G
CREAM TOPICAL
Status: DISCONTINUED | OUTPATIENT
Start: 2020-10-16 | End: 2020-10-18 | Stop reason: HOSPADM

## 2020-10-16 RX ORDER — LORAZEPAM 2 MG/ML
1 INJECTION INTRAMUSCULAR ONCE
Status: DISCONTINUED | OUTPATIENT
Start: 2020-10-16 | End: 2020-10-16

## 2020-10-16 RX ORDER — FOLIC ACID 1 MG/1
1 TABLET ORAL DAILY
Status: DISCONTINUED | OUTPATIENT
Start: 2020-10-17 | End: 2020-10-18 | Stop reason: HOSPADM

## 2020-10-16 RX ORDER — ONDANSETRON 4 MG/1
4 TABLET, ORALLY DISINTEGRATING ORAL EVERY 6 HOURS PRN
Status: DISCONTINUED | OUTPATIENT
Start: 2020-10-16 | End: 2020-10-18 | Stop reason: HOSPADM

## 2020-10-16 RX ORDER — POTASSIUM CHLORIDE 1.5 G/1.58G
20-40 POWDER, FOR SOLUTION ORAL
Status: DISCONTINUED | OUTPATIENT
Start: 2020-10-16 | End: 2020-10-18 | Stop reason: HOSPADM

## 2020-10-16 RX ORDER — POTASSIUM CL/LIDO/0.9 % NACL 10MEQ/0.1L
10 INTRAVENOUS SOLUTION, PIGGYBACK (ML) INTRAVENOUS
Status: DISCONTINUED | OUTPATIENT
Start: 2020-10-16 | End: 2020-10-18 | Stop reason: HOSPADM

## 2020-10-16 RX ORDER — PROCHLORPERAZINE MALEATE 5 MG
10 TABLET ORAL EVERY 6 HOURS PRN
Status: DISCONTINUED | OUTPATIENT
Start: 2020-10-16 | End: 2020-10-18 | Stop reason: HOSPADM

## 2020-10-16 RX ORDER — POTASSIUM CHLORIDE 1500 MG/1
20-40 TABLET, EXTENDED RELEASE ORAL
Status: DISCONTINUED | OUTPATIENT
Start: 2020-10-16 | End: 2020-10-18 | Stop reason: HOSPADM

## 2020-10-16 RX ORDER — ACETAMINOPHEN 325 MG/1
650 TABLET ORAL EVERY 4 HOURS PRN
Status: DISCONTINUED | OUTPATIENT
Start: 2020-10-16 | End: 2020-10-18 | Stop reason: HOSPADM

## 2020-10-16 RX ORDER — TRAZODONE HYDROCHLORIDE 50 MG/1
50 TABLET, FILM COATED ORAL
COMMUNITY
End: 2020-10-16

## 2020-10-16 RX ORDER — POTASSIUM CHLORIDE 7.45 MG/ML
10 INJECTION INTRAVENOUS
Status: DISCONTINUED | OUTPATIENT
Start: 2020-10-16 | End: 2020-10-18 | Stop reason: HOSPADM

## 2020-10-16 RX ORDER — ONDANSETRON 2 MG/ML
INJECTION INTRAMUSCULAR; INTRAVENOUS
Status: COMPLETED
Start: 2020-10-16 | End: 2020-10-16

## 2020-10-16 RX ORDER — HYDROMORPHONE HYDROCHLORIDE 1 MG/ML
0.3 INJECTION, SOLUTION INTRAMUSCULAR; INTRAVENOUS; SUBCUTANEOUS
Status: DISCONTINUED | OUTPATIENT
Start: 2020-10-16 | End: 2020-10-18 | Stop reason: HOSPADM

## 2020-10-16 RX ORDER — LANOLIN ALCOHOL/MO/W.PET/CERES
100 CREAM (GRAM) TOPICAL DAILY
Status: DISCONTINUED | OUTPATIENT
Start: 2020-10-17 | End: 2020-10-18 | Stop reason: HOSPADM

## 2020-10-16 RX ORDER — ACETAMINOPHEN 500 MG
1000 TABLET ORAL EVERY 6 HOURS PRN
COMMUNITY

## 2020-10-16 RX ORDER — NALOXONE HYDROCHLORIDE 0.4 MG/ML
.1-.4 INJECTION, SOLUTION INTRAMUSCULAR; INTRAVENOUS; SUBCUTANEOUS
Status: DISCONTINUED | OUTPATIENT
Start: 2020-10-16 | End: 2020-10-18 | Stop reason: HOSPADM

## 2020-10-16 RX ORDER — MULTIPLE VITAMINS W/ MINERALS TAB 9MG-400MCG
1 TAB ORAL DAILY
Status: DISCONTINUED | OUTPATIENT
Start: 2020-10-17 | End: 2020-10-18 | Stop reason: HOSPADM

## 2020-10-16 RX ORDER — PROCHLORPERAZINE 25 MG
25 SUPPOSITORY, RECTAL RECTAL EVERY 12 HOURS PRN
Status: DISCONTINUED | OUTPATIENT
Start: 2020-10-16 | End: 2020-10-18 | Stop reason: HOSPADM

## 2020-10-16 RX ORDER — LORAZEPAM 1 MG/1
1-2 TABLET ORAL EVERY 30 MIN PRN
Status: DISCONTINUED | OUTPATIENT
Start: 2020-10-16 | End: 2020-10-18 | Stop reason: HOSPADM

## 2020-10-16 RX ORDER — LORAZEPAM 0.5 MG/1
0.5 TABLET ORAL ONCE
Status: COMPLETED | OUTPATIENT
Start: 2020-10-16 | End: 2020-10-16

## 2020-10-16 RX ORDER — LORAZEPAM 2 MG/ML
0.5 INJECTION INTRAMUSCULAR ONCE
Status: COMPLETED | OUTPATIENT
Start: 2020-10-16 | End: 2020-10-16

## 2020-10-16 RX ADMIN — SODIUM CHLORIDE, POTASSIUM CHLORIDE, SODIUM LACTATE AND CALCIUM CHLORIDE 1000 ML: 600; 310; 30; 20 INJECTION, SOLUTION INTRAVENOUS at 20:39

## 2020-10-16 RX ADMIN — ONDANSETRON 4 MG: 2 INJECTION INTRAMUSCULAR; INTRAVENOUS at 17:28

## 2020-10-16 RX ADMIN — LORAZEPAM 0.5 MG: 2 INJECTION INTRAMUSCULAR; INTRAVENOUS at 21:31

## 2020-10-16 RX ADMIN — THIAMINE HYDROCHLORIDE: 100 INJECTION, SOLUTION INTRAMUSCULAR; INTRAVENOUS at 18:21

## 2020-10-16 RX ADMIN — SODIUM CHLORIDE 1000 ML: 9 INJECTION, SOLUTION INTRAVENOUS at 17:30

## 2020-10-16 RX ADMIN — ONDANSETRON 4 MG: 2 INJECTION INTRAMUSCULAR; INTRAVENOUS at 20:42

## 2020-10-16 RX ADMIN — DEXTROSE AND SODIUM CHLORIDE: 5; 900 INJECTION, SOLUTION INTRAVENOUS at 21:31

## 2020-10-16 RX ADMIN — LORAZEPAM 0.5 MG: 0.5 TABLET ORAL at 19:07

## 2020-10-16 ASSESSMENT — ENCOUNTER SYMPTOMS
RHINORRHEA: 0
VOMITING: 1
HALLUCINATIONS: 0
DIARRHEA: 0
DYSPHORIC MOOD: 1
CHILLS: 0
COUGH: 0
NAUSEA: 1
FEVER: 0

## 2020-10-16 ASSESSMENT — MIFFLIN-ST. JEOR: SCORE: 1532.09

## 2020-10-16 NOTE — ED TRIAGE NOTES
Pt presents to the ED via EMS for evaluation of alcohol intoxication. Pt has an alcohol abuse hx. Per EMS, pt recently 2 months sober. Per EMS, pt called 911 for herself because she had been drinking for 2 days straight. Per EMS, recent divorce stressor. 18g left AC.

## 2020-10-16 NOTE — ED NOTES
Bed: [facility]  Expected date:   Expected time:   Means of arrival:   Comments:  Fred 532 ETOH 55 f

## 2020-10-16 NOTE — ED PROVIDER NOTES
"  History   Chief Complaint:  Alcohol Intoxication    The history is provided by the patient.     Salina Khan is a 55 year old female who presents via EMS for alcohol intoxication. Salina has been drinking daily for the last week, remarking a 1.75 L bottle of vodka lasts just over a day. She called EMS because she \"does not want to feel like this anymore\" and feels she \"drank too much to get sober on my own.\" Salina has not been eating during this binge, but does report medication compliance. She has nausea and vomited a couple times at home today. She denies any other symptoms including diarrhea, cough, fever, or concern for COVID exposures.     Salina has gone through treatment at [facility] in the past. She has never required hospitalization for alcohol withdrawal and has never had DTs or seizures. She is tearful when discussing her alcohol abuse and remarks it has been exacerbated by currently going through a divorce. She is \"trying to stop loving my \" through drinking. She denies suicidal ideations or self harm attempts, nor has she felt any homicidal ideation towards her  or others. She denies hallucinations, drug use, or tobacco use.     Allergies:  Erythromycin  Nsaids    Medications:    Effexor   Lisinopril     Past Medical History:    Anxiety   Depression   Hypertension   Gestational diabetes  SBO   Obesity     Past Surgical History:       Tubal ligation      Family History:    Hypertension   Cystic fibrosis   Diabetes      Social History:  Marital Status: .  Presents alone with EMS.  Negative for tobacco use.  Positive for alcohol use.   Negative for drug use.     Review of Systems   Constitutional: Positive for appetite change. Negative for chills and fever.   HENT: Negative for congestion and rhinorrhea.    Respiratory: Negative for cough.    Gastrointestinal: Positive for nausea and vomiting. Negative for diarrhea.   Neurological: Negative for seizures. " "  Psychiatric/Behavioral: Positive for dysphoric mood. Negative for hallucinations, self-injury and suicidal ideas.   All other systems reviewed and are negative.    Physical Exam     Patient Vitals for the past 24 hrs:   BP Temp Temp src Pulse Resp SpO2 Height Weight   10/16/20 2307 118/62 98.3  F (36.8  C) Oral 117 18 98 % -- --   10/16/20 2254 123/84 -- -- 118 18 97 % -- --   10/16/20 2239 -- -- -- 117 23 97 % -- --   10/16/20 2200 135/81 -- -- 105 22 94 % -- --   10/16/20 2150 -- -- -- 105 23 95 % -- --   10/16/20 2130 135/84 -- -- 103 22 96 % -- --   10/16/20 2115 -- -- -- 101 19 96 % -- --   10/16/20 2100 127/82 -- -- 104 15 96 % -- --   10/16/20 2045 -- -- -- 105 19 97 % -- --   10/16/20 2030 120/73 -- -- 118 24 91 % -- --   10/16/20 2018 -- -- -- 116 27 -- -- --   10/16/20 2015 -- -- -- 118 -- 90 % -- --   10/16/20 1930 133/80 -- -- 97 -- 90 % -- --   10/16/20 1915 -- -- -- -- -- 93 % -- --   10/16/20 1900 (!) 145/86 -- -- 95 -- 100 % -- --   10/16/20 1830 -- -- -- -- -- 97 % -- --   10/16/20 1820 -- -- -- -- -- 97 % -- --   10/16/20 1810 -- -- -- -- -- 93 % -- --   10/16/20 1800 -- -- -- -- -- 96 % -- --   10/16/20 1740 -- -- -- -- -- 98 % -- --   10/16/20 1730 -- -- -- -- -- 96 % -- --   10/16/20 1720 -- -- -- -- -- 97 % -- --   10/16/20 1709 (!) 145/94 97.9  F (36.6  C) Oral 102 16 98 % 1.575 m (5' 2\") 98.9 kg (218 lb)        Physical Exam  General: Well-developed and well-nourished. Well appearing middle aged  woman. Cooperative.  Head:  Atraumatic.  Eyes:  Conjunctivae, lids, and sclerae are normal.  Neck:  Supple. Normal range of motion.  CV:  Tachycardic rate and regular rhythm. Normal heart sounds with no murmurs, rubs, or gallops detected.  Resp:  No respiratory distress. Clear to auscultation bilaterally without decreased breath sounds, wheezing, rales, or rhonchi.  GI:  Soft. Non-distended. Non-tender.    MS:  Normal ROM.   Skin:  Warm. Non-diaphoretic. No pallor.  Neuro: Awake. A&Ox3. " Normal strength.  Psych:  Dysphoric, tearful mood and affect. Normal speech.  No suicidal thought content.  Not responding to internal stimuli.  Vitals reviewed.    Emergency Department Course     Laboratory:  Laboratory findings were communicated with the patient who voiced understanding of the findings.  CBC: WBC: 5.9, HGB: 14.2, PLT: 241  CMP (collected 1716): Glucose 56 (L), CO2; 16 (L), Anion gap: 22 (H), Ca: 8.1 (L), AST: 48 (H), o/w WNL (Creatinine: 0.59)  BMP (collected 2018): Glucose 116 (H), CO2: 18 (L), Anion gap: 16 (H), Ca: 7.5 (L), o/w WNL (Creatinine: 0.56)    Lactic acid (Resulted: 1736): 6.0 (HH)  Lactic acid (Resulted: 2005): 5.0 (HH)  Lactic acid (Resulted: 2123): 4.6 (HH)    Alcohol ethyl: 0.30 (H)  Ketone beta Hydroxybutyrate: 2.9 (HH)    Glucose by meter (collected: 1715): 54 (L)  Glucose by meter (collected: 1803): 116 (H)  Glucose by meter (collected: 2026): 162 (H)    Asymptomatic COVID-19 Virus PCR: pending     Interventions:  1728 Zofran, 4 mg, IV injection   1730 NS, 1000 mL, IV   1821 Banana bag, 1000 mL, IV   1907 Ativan, 0.5 mg, PO  2039 LR, 1000 mL, IV  2042 Zofran, 4 mg, IV injection   2131 Ativan, 0.5 mg, IV injection    Dextrose 5% and NS infusion, IV     Emergency Department Course:  Nursing notes and vitals reviewed.   1715: I performed an exam of the patient as documented above. KAREN hold placed at this time.      IV was inserted and blood was drawn for laboratory testing, results above. Medicine administered as documented above.     1931: I rechecked the patient and discussed the results of her workup thus far. She has not yet eaten.     2022: Per RN, the patient has started to feel dizzy and more nauseated.     2030: I rechecked the patient. She generally feels awful and is comfortable with a medical admission. CIWA score is 9.     2040: I consulted with Dr. Allen of the hospitalist service. She is in agreement to accept the patient for admission but requests ketones.    The  patient's nose was swabbed and this sample was sent for COVID testing.     Findings and plan explained to the patient who consents to admission. Discussed the patient with Dr. Allen who will admit the patient for further monitoring, evaluation, and treatment.    I personally reviewed the laboratory results with the patient and answered all related questions prior to admission.    Impression & Plan      Covid-19  Salina Khan was evaluated during a global COVID-19 pandemic, which necessitated consideration that the patient might be at risk for infection with the SARS-CoV-2 virus that causes COVID-19.   Applicable protocols for evaluation were followed during the patient's care.   COVID-19 was considered as part of the patient's evaluation. The plan for testing is:  a test was obtained during this visit.    CMS Diagnosis: The Lactic acid level is elevated due to alcohol withdrawal, decreased PO intake, and ketoacidosis, at this time there is no sign of severe sepsis or septic shock.     Medical Decision Making:   Salina is a 55 year old woman with a history of alcohol abuse who has been drinking daily for the last week citing depression as she is going through a divorce.  She denies suicidal thought content but presents because she would like to stop drinking and does not like the way she feels.  Her exam is remarkable only for her dysphoric mood and tachycardia.  She was hypoglycemic to 54.  She was given apple juice with resolution of this hypoglycemia and she was never altered.  Blood alcohol level is elevated to 0.3 as expected.  During her course, she did become slightly more tachycardic, nauseous, and anxious suggesting she is already starting to go into some mild, early alcohol withdrawal for which she was given both oral and IV Ativan, as above.  Her lactic acidosis was initially quite elevated at 6.0.  After a banana bag and 1 L of IV fluids this improved to 5.0.  After a third liter of lactated ringers,  it improved further to 4.6.  Laboratory studies are otherwise significant for evidence of acidosis with a bicarb of 16 and anion gap of 22.  This is likely related to alcoholic ketoacidosis. AST is only minimally elevated at 48 and she has no macrocytosis, anemia, or leukocytosis.  After fluids, repeat BMP shows improvement with a bicarb of 18 and anion gap of 16.  However, this patient clearly requires admission for further IV fluids and monitoring as I suspect her alcohol withdrawal will get worse before it gets better.  I discussed plan for admission with Salina and answered all her questions.  She verbalized understanding and is amenable.  I discussed the patient's case with , hospitalist, who recommends ketones as well as dextrose containing fluids despite Salina taking glucose-containing foods by mouth with no vomiting after Zofran.  Ketones are elevated at 2.9 as expected and D5 NS infusion was initiated.  She was transferred to floor in stable condition although she remains tachycardic.    Diagnosis:     ICD-10-CM    1. Alcoholic ketoacidosis  E87.2    2. Lactic acidosis  E87.2    3. Alcohol abuse  F10.10    4. Alcoholic intoxication without complication (H)  F10.920    5. Alcohol withdrawal syndrome without complication (H)  F10.230     Early, mild   6. Adjustment disorder with depressed mood  F43.21    7. Hypoglycemia  E16.2        Disposition:   Admitted under the care of Dr. Allen.      Scribe Disclosure:  I, Margaux Joelbeth, am serving as a scribe on 10/16/2020 at 5:22 PM to personally document services performed by Gloria Weathers MD based on my observations and the provider's statements to me.       EMERGENCY DEPARTMENT     Gloria Weathers MD  10/17/20 [id number]

## 2020-10-16 NOTE — ED NOTES
DATE:  10/16/2020   TIME OF RECEIPT FROM LAB: 0358  LAB TEST:  Lactate 6.0  LAB VALUE:  See above  RESULTS GIVEN WITH READ-BACK TO (PROVIDER):  Sarahy BARFIELD LAB VALUE REPORTED TO PROVIDER:   4599

## 2020-10-17 LAB
ALBUMIN SERPL-MCNC: 3.1 G/DL (ref 3.4–5)
ALP SERPL-CCNC: 131 U/L (ref 40–150)
ALT SERPL W P-5'-P-CCNC: 31 U/L (ref 0–50)
ANION GAP SERPL CALCULATED.3IONS-SCNC: 4 MMOL/L (ref 3–14)
AST SERPL W P-5'-P-CCNC: 32 U/L (ref 0–45)
BILIRUB SERPL-MCNC: 1.2 MG/DL (ref 0.2–1.3)
BUN SERPL-MCNC: 12 MG/DL (ref 7–30)
CALCIUM SERPL-MCNC: 7.9 MG/DL (ref 8.5–10.1)
CHLORIDE SERPL-SCNC: 106 MMOL/L (ref 94–109)
CO2 SERPL-SCNC: 26 MMOL/L (ref 20–32)
CREAT SERPL-MCNC: 0.64 MG/DL (ref 0.52–1.04)
ERYTHROCYTE [DISTWIDTH] IN BLOOD BY AUTOMATED COUNT: 14.3 % (ref 10–15)
GFR SERPL CREATININE-BSD FRML MDRD: >90 ML/MIN/{1.73_M2}
GLUCOSE SERPL-MCNC: 213 MG/DL (ref 70–99)
HCT VFR BLD AUTO: 38.5 % (ref 35–47)
HGB BLD-MCNC: 12.4 G/DL (ref 11.7–15.7)
KETONES BLD-SCNC: 0.3 MMOL/L (ref 0–0.6)
LABORATORY COMMENT REPORT: NORMAL
LACTATE BLD-SCNC: 1.3 MMOL/L (ref 0.7–2)
LACTATE BLD-SCNC: 1.9 MMOL/L (ref 0.7–2)
MCH RBC QN AUTO: 31.3 PG (ref 26.5–33)
MCHC RBC AUTO-ENTMCNC: 32.2 G/DL (ref 31.5–36.5)
MCV RBC AUTO: 97 FL (ref 78–100)
PLATELET # BLD AUTO: 160 10E9/L (ref 150–450)
POTASSIUM SERPL-SCNC: 4.2 MMOL/L (ref 3.4–5.3)
PROT SERPL-MCNC: 6.6 G/DL (ref 6.8–8.8)
RBC # BLD AUTO: 3.96 10E12/L (ref 3.8–5.2)
SARS-COV-2 RNA SPEC QL NAA+PROBE: NEGATIVE
SODIUM SERPL-SCNC: 136 MMOL/L (ref 133–144)
SPECIMEN SOURCE: NORMAL
WBC # BLD AUTO: 5.2 10E9/L (ref 4–11)

## 2020-10-17 PROCEDURE — 250N000013 HC RX MED GY IP 250 OP 250 PS 637: Performed by: INTERNAL MEDICINE

## 2020-10-17 PROCEDURE — 83605 ASSAY OF LACTIC ACID: CPT | Performed by: INTERNAL MEDICINE

## 2020-10-17 PROCEDURE — 99221 1ST HOSP IP/OBS SF/LOW 40: CPT | Performed by: PSYCHIATRY & NEUROLOGY

## 2020-10-17 PROCEDURE — 250N000013 HC RX MED GY IP 250 OP 250 PS 637: Performed by: STUDENT IN AN ORGANIZED HEALTH CARE EDUCATION/TRAINING PROGRAM

## 2020-10-17 PROCEDURE — 250N000013 HC RX MED GY IP 250 OP 250 PS 637: Performed by: PSYCHIATRY & NEUROLOGY

## 2020-10-17 PROCEDURE — 36415 COLL VENOUS BLD VENIPUNCTURE: CPT | Performed by: INTERNAL MEDICINE

## 2020-10-17 PROCEDURE — 82010 KETONE BODYS QUAN: CPT | Performed by: INTERNAL MEDICINE

## 2020-10-17 PROCEDURE — 120N000001 HC R&B MED SURG/OB

## 2020-10-17 PROCEDURE — 258N000001 HC RX 258: Performed by: INTERNAL MEDICINE

## 2020-10-17 PROCEDURE — 250N000011 HC RX IP 250 OP 636: Performed by: INTERNAL MEDICINE

## 2020-10-17 PROCEDURE — C9113 INJ PANTOPRAZOLE SODIUM, VIA: HCPCS | Performed by: INTERNAL MEDICINE

## 2020-10-17 PROCEDURE — 85027 COMPLETE CBC AUTOMATED: CPT | Performed by: INTERNAL MEDICINE

## 2020-10-17 PROCEDURE — 80053 COMPREHEN METABOLIC PANEL: CPT | Performed by: INTERNAL MEDICINE

## 2020-10-17 PROCEDURE — 99232 SBSQ HOSP IP/OBS MODERATE 35: CPT | Performed by: STUDENT IN AN ORGANIZED HEALTH CARE EDUCATION/TRAINING PROGRAM

## 2020-10-17 RX ORDER — HYDROXYZINE HYDROCHLORIDE 25 MG/1
25 TABLET, FILM COATED ORAL EVERY 6 HOURS PRN
Status: DISCONTINUED | OUTPATIENT
Start: 2020-10-17 | End: 2020-10-18 | Stop reason: HOSPADM

## 2020-10-17 RX ORDER — TRAZODONE HYDROCHLORIDE 50 MG/1
50 TABLET, FILM COATED ORAL AT BEDTIME
Status: DISCONTINUED | OUTPATIENT
Start: 2020-10-17 | End: 2020-10-18 | Stop reason: HOSPADM

## 2020-10-17 RX ORDER — HYDROXYZINE HYDROCHLORIDE 25 MG/1
50 TABLET, FILM COATED ORAL EVERY 6 HOURS PRN
Status: DISCONTINUED | OUTPATIENT
Start: 2020-10-17 | End: 2020-10-18 | Stop reason: HOSPADM

## 2020-10-17 RX ADMIN — MULTIPLE VITAMINS W/ MINERALS TAB 1 TABLET: TAB at 08:13

## 2020-10-17 RX ADMIN — ACETAMINOPHEN 650 MG: 325 TABLET, FILM COATED ORAL at 22:16

## 2020-10-17 RX ADMIN — LISINOPRIL 20 MG: 20 TABLET ORAL at 08:13

## 2020-10-17 RX ADMIN — ONDANSETRON 4 MG: 4 TABLET, ORALLY DISINTEGRATING ORAL at 15:48

## 2020-10-17 RX ADMIN — TRAZODONE HYDROCHLORIDE 50 MG: 50 TABLET ORAL at 22:16

## 2020-10-17 RX ADMIN — ACETAMINOPHEN 650 MG: 325 TABLET, FILM COATED ORAL at 18:33

## 2020-10-17 RX ADMIN — ACETAMINOPHEN 650 MG: 325 TABLET, FILM COATED ORAL at 12:48

## 2020-10-17 RX ADMIN — POTASSIUM CHLORIDE, DEXTROSE MONOHYDRATE AND SODIUM CHLORIDE: 150; 5; 900 INJECTION, SOLUTION INTRAVENOUS at 01:17

## 2020-10-17 RX ADMIN — LORAZEPAM 1 MG: 1 TABLET ORAL at 01:39

## 2020-10-17 RX ADMIN — POTASSIUM CHLORIDE, DEXTROSE MONOHYDRATE AND SODIUM CHLORIDE: 150; 5; 900 INJECTION, SOLUTION INTRAVENOUS at 22:16

## 2020-10-17 RX ADMIN — ACETAMINOPHEN 650 MG: 325 TABLET, FILM COATED ORAL at 01:20

## 2020-10-17 RX ADMIN — HYDROXYZINE HYDROCHLORIDE 25 MG: 25 TABLET, FILM COATED ORAL at 15:48

## 2020-10-17 RX ADMIN — POTASSIUM CHLORIDE, DEXTROSE MONOHYDRATE AND SODIUM CHLORIDE: 150; 5; 900 INJECTION, SOLUTION INTRAVENOUS at 15:24

## 2020-10-17 RX ADMIN — POTASSIUM CHLORIDE, DEXTROSE MONOHYDRATE AND SODIUM CHLORIDE: 150; 5; 900 INJECTION, SOLUTION INTRAVENOUS at 07:57

## 2020-10-17 RX ADMIN — PANTOPRAZOLE SODIUM 40 MG: 40 INJECTION, POWDER, FOR SOLUTION INTRAVENOUS at 08:12

## 2020-10-17 RX ADMIN — FOLIC ACID 1 MG: 1 TABLET ORAL at 08:13

## 2020-10-17 RX ADMIN — THIAMINE HCL TAB 100 MG 100 MG: 100 TAB at 08:13

## 2020-10-17 RX ADMIN — LORAZEPAM 1 MG: 1 TABLET ORAL at 00:31

## 2020-10-17 RX ADMIN — ACETAMINOPHEN 650 MG: 325 TABLET, FILM COATED ORAL at 08:12

## 2020-10-17 ASSESSMENT — ACTIVITIES OF DAILY LIVING (ADL)
ADLS_ACUITY_SCORE: 13
ADLS_ACUITY_SCORE: 11
ADLS_ACUITY_SCORE: 13

## 2020-10-17 ASSESSMENT — ENCOUNTER SYMPTOMS
APPETITE CHANGE: 1
SEIZURES: 0

## 2020-10-17 NOTE — PROGRESS NOTES
Admission    Patient arrives to room 615-2 via cart from ED.  Care plan note: A&Ox4 VSS on 2L NC Reg diet. Up SBA    Inpatient nursing criteria listed below were met:    PCD's Documented: Yes  Skin issues/needs documented :NA  Isolation education started/completed NA  Patient allergies verified with patient: Yes  Verified completion of [place] Risk Assessment Tool:  Yes  Verified completion of Guardianship screening tool: Yes  Fall Prevention: Care plan updated, Education given and documented Yes  Care Plan initiated: Yes  Home medications documented in belongings flowsheet: NA  Patient belongings documented in belongings flowsheet: Yes  Reminder note (belongings/ medications) placed in discharge instructions:NA  Admission profile/ required documentation complete: Yes  Bedside Report Letter given and explained to patient NA  Visitor Designated? No  If patient is a 72 hour hold/Commitment are belongings removed from room and locked up? NA

## 2020-10-17 NOTE — ED NOTES
Video Observation initiated, patient informed. Pt willing to change into behavioral scrubs.     Chelly Holm RN

## 2020-10-17 NOTE — PHARMACY-ADMISSION MEDICATION HISTORY
"Pharmacy Medication History  Admission medication history interview status for the 10/16/2020  admission is complete. See EPIC admission navigator for prior to admission medications       Medication history sources: Patient and Surescripts  Location of interview:  Phone  Medication history source reliability: Good  Adherence assessment: Good    Significant changes made to the medication list:  D/C'd all vitamins, Trazodone, Naltrexone, and Valtrex.      Additional medication history information:   none    Medication reconciliation completed by provider prior to medication history? Lisinopril was ordered but Tylenol and Effexor were not addressed.    Time spent in this activity: 20\"      Prior to Admission medications    Medication Sig Last Dose Taking? Auth Provider   acetaminophen (TYLENOL) 500 MG tablet Take 1,000 mg by mouth every 6 hours as needed for mild pain prn Yes Unknown, Entered By History   lisinopril (ZESTRIL) 20 MG tablet TAKE 1 TABLET(20 MG) BY MOUTH DAILY  Patient taking differently: Take 20 mg by mouth At Bedtime  10/15/2020 at hs Yes Devin Beebe MD   venlafaxine (EFFEXOR-XR) 75 MG 24 hr capsule TAKE 1 CAPSULE(75 MG) BY MOUTH DAILY  Patient taking differently: Take 75 mg by mouth At Bedtime  10/15/2020 at hs Yes Eliu Nunes MD       "

## 2020-10-17 NOTE — ED NOTES
Repeat Lactate  DATE:  10/16/2020   TIME OF RECEIPT FROM LAB:   8:36 PM  LAB TEST:  Lactate  LAB VALUE:  4.5  RESULTS GIVEN WITH READ-BACK TO (PROVIDER):  Gloria Weathers MD  TIME LAB VALUE REPORTED TO PROVIDER:   8:36 PM

## 2020-10-17 NOTE — PROGRESS NOTES
RECEIVING UNIT ED HANDOFF REVIEW    ED Nurse Handoff Report was reviewed by: Kassy Nassar RN on October 16, 2020 at 9:51 PM

## 2020-10-17 NOTE — ED NOTES
DATE:  10/16/2020   TIME OF RECEIPT FROM LAB: 2124  LAB TEST:  Lactic and ketone  LAB VALUE:  4.6 and 2.9  RESULTS GIVEN WITH READ-BACK TO (PROVIDER):  Dr Weathers  TIME LAB VALUE REPORTED TO PROVIDER:   2125

## 2020-10-17 NOTE — ED NOTES
DATE:  10/16/2020   TIME OF RECEIPT FROM LAB:  2003  LAB TEST:  lactic  LAB VALUE:  5  RESULTS GIVEN WITH READ-BACK TO (PROVIDER):  Dr. Weathers  TIME LAB VALUE REPORTED TO PROVIDER:   2004

## 2020-10-17 NOTE — ED NOTES
[facility]  ED Nurse Handoff Report    ED Chief complaint: Alcohol Intoxication      ED Diagnosis:   Final diagnoses:   None       Code Status: Full Code    Allergies:   Allergies   Allergen Reactions     Erythromycin Swelling     Other reaction(s): Edema  topical EES: eye swelling  Eye drops        Nsaids Other (See Comments)     Other reaction(s): Other - Describe In Comment Field  Patient had bariatric surgery. Should not ever take NSAIDS due to high risk for gastric ulcers.   Avoid because of gastric by pass surgery       Patient Story: Pt presents to the ER with alcohol intoxication. Wants help to stop drinking. Pt has been going through a divorce.  Focused Assessment:  Pt initially anxious but cooperative. Received 0.5mg po ativan to help her relax. Initial blood sugar 54 and initial lactic 6. After 2L fluid repeat was 5. At approx 2030, pt started to feel dizzy, some SOB, worsening nausea, and headache. Heart rate back up to 120s. Pt reports these are her usual withdrawal symptoms and denies hx of seizures. Lungs clear. Pt's O2 sat dropped to 88% on RA while resting and placed on 2L O2. Via NC. Dr. Maciel aware.    Treatments and/or interventions provided: see above  Patient's response to treatments and/or interventions: see above    To be done/followed up on inpatient unit:  none pending    Does this patient have any cognitive concerns?: none    Activity level - Baseline/Home:  Independent  Activity Level - Current:   Stand with Assist    Patient's Preferred language: English   Needed?: No    Isolation: None  Infection: Not Applicable  Bariatric?: No    Vital Signs:   Vitals:    10/16/20 1915 10/16/20 1930 10/16/20 2015 10/16/20 2018   BP:  133/80     Pulse:  97 118 116   Resp:    27   Temp:       TempSrc:       SpO2: 93% 90% 90%    Weight:       Height:           Cardiac Rhythm:     Was the PSS-3 completed:   Yes  What interventions are required if any?               Family  Comments: None present.  OBS brochure/video discussed/provided to patient/family: N/A              Name of person given brochure if not patient: N/A              Relationship to patient: N/A    For the majority of the shift this patient's behavior was Yellow.   Behavioral interventions performed were information.    ED NURSE PHONE NUMBER: [phone]

## 2020-10-17 NOTE — ED NOTES
Pt reports that nausea is much improved as well as dizziness and headache. Given a granola bar as she was feeling hungry.

## 2020-10-17 NOTE — ED NOTES
Pt tolerating po. Only slight dizziness remains. Headache and nausea improved. Only thing of note is pt's HR back up to 120s. Dr. Weathers updated.

## 2020-10-17 NOTE — PROGRESS NOTES
"Clinical Nutrition Services - Brief Note    Received Admission Nutrition Risk Screen - Reduced oral intake over the last month     Chart reviewed and visited with patient at bedside  Patient had not been eating for 4 days PTA while she was drinking heavily   Today she endorses having an appetite and has been consuming meals at 100%. She states that she knows how to eat, \"I just haven't been doing it\"  Note, she has a h/o of gastric bypass from 2014. She reports that she tries to focus on protein and water intake but doesn't follow any particular eating plan. She does not take her micronutrient supplements regularly    Patient politely declined intervention from RD today. Will follow-up at LOS unless re-consulted     Molly Su, NANCY, LD  Clinical Dietitian       "
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
## 2020-10-17 NOTE — CONSULTS
"Care Management Initial Consult    General Information  Assessment completed with:: Patient,    Type of CM/SW Visit: Initial Assessment     Reason for Consult: substance use concerns  Advance Care Planning:            Communication Assessment  Patient's communication style: spoken language (English or Bilingual)  Hearing Difficulty or Deaf: no Wear Glasses or Blind: yes    Cognitive  Cognitive/Neuro/Behavioral: WDL                      Living Environment:   People in home: child(mirta), dependent, child(mirta), adult     Current living Arrangements: house          Family/Social Support:  Care provided by: self  Provides care for:    Marital Status: Single  Who is your support system?: Children     Description of Support System: Involved  Description of Support System: Involved  Adequate family and caregiver support        Description of Support System: Involved  Support Assessment: Adequate family and caregiver support    Current Resources:   Skilled Home Care Services:  None  Community Resources:  None  Equipment currently used at home: none  Supplies currently used at home:      Employment:  Employment Status:     Not addressed     Financial/Environmental Concerns: No insurance listed. Pt says she has \"MNSure\" but no card on her.            Lifestyle & Psychosocial Needs:        Socioeconomic History     Marital status:      Spouse name: Not on file     Number of children: Not on file     Years of education: Not on file     Highest education level: Not on file     Tobacco Use     Smoking status: Never Smoker     Smokeless tobacco: Never Used   Substance and Sexual Activity     Alcohol use: No     Alcohol/week: 0.0 standard drinks     Frequency: Never     Comment: in recovery from alcoholism, attending AA daily and in an outpatient  evening program 3 nights weekly.     Drug use: No     Sexual activity: Yes     Partners: Male       Functional Status:  Prior to admission patient needed assistance:   Independent   "     Mental Health Status:  WDL           Values/Beliefs:  Spiritual, Cultural Beliefs, [employer] Practices, Values that affect care:      Not addressed           Additional Information:  Pt agreeable to meeting with CD. SW provided her with the Rule 25 paperwork, per CD request. Will e-mail it back to CD when completed. Pt reports she would be open to out-pt treatment.     RAMEZ Norris, LGSW  [phone]  [facility]

## 2020-10-17 NOTE — PLAN OF CARE
DATE & TIME: 10/17/20 [id number]  Cognitive Concerns/ Orientation : A&Ox4  BEHAVIOR & AGGRESSION TOOL COLOR: Jenni  CIWA SCORE: 6, 9, 8, 0, headache and anxiety.  ABNL VS/O2: VSS on 2L NC  MOBILITY: SBA gb to BR  PAIN MANAGMENT: Prn Tylenol given x1 for headache.  DIET: Reg  BOWEL/BLADDER: Up to BR  ABNL LAB/BG: Ketone 2.9  DRAIN/DEVICES: PIV infusing at 150ml  TELEMETRY RHYTHM: ST  SKIN: Intact  TESTS/PROCEDURES:   D/C DAY/GOALS/PLACE: Pending  OTHER IMPORTANT INFO: Psych and Chem dep consulted.

## 2020-10-17 NOTE — CONSULTS
10/16/20 CD consult acknowledged.  Per chart review, pt does not have active funding listed. The writer e-mailed unit  requesting she provide Rule 25 [facility] application to patient. Requested unit  e-mail chem dep  completed Rule 25 application if pt is interested in seeking services and at that time consult will be completed if patient is appropriate for consult and appropriate for CD treatment services.     Becca Moreira, FERNANDOC, LADC

## 2020-10-17 NOTE — H&P
[facility]  History and Physical   Hospitalist  Eli Maciel MD       Salina Khan MRN# [id number]   YOB: 1965 Age: 55 year old      Date of Admission:  10/16/2020         Assessment and Plan:   Salina Khan is a 55 year old female with history of alcohol dependence and 2 prior inpatient treatments presented to the ED via EMS for evaluation of alcohol intoxication.  He called EMS as she had been drinking for the last 4 days straight at least 2 L of vodka a day.  When she has not been eating anything for the last 4 days.  She felt nauseated but no vomiting dry heaving as per the patient.  She is under a lot of stress as she is going through divorce was trying to overcome the stress by drinking alcohol.  She was hypoglycemic with blood sugar level of 54 in the emergency room.  And her lactate was elevated at 6.    Alcoholic and starvation ketoacidosis;  Lactic acidosis secondary to above;  Patient was drinking alcohol heavily and not eating prior to hospitalization that were contributed to the alcoholic and starvation ketoacidosis  Will treat her with D5 IV normal saline for at 150 mL/h  Monitor lactate levels closely and recheck ketone levels in the morning    Alcohol dependence and intoxication  Alcohol withdrawal  We will place her on CIWA protocol with Ativan as needed dosing  Multivitamin folate thiamine replacement ordered  CHEM Depp sign psychiatric consultations will be requested    Hypertension;  Continue lisinopril with hold parameters    DVT prophylaxis PCD's    IV fluids; D5 normal saline with 20 of K at 150 mL/h  Almeida catheter not needed  Diet regular diet as tolerated    CODE STATUS discussed with the patient she wants to be full code    Admit as inpatient    Eli Maciel MD            Code Status:   Full Code         Primary Care Physician:   Renita Cordova [phone]         Chief Complaint:   Binge  drinking alcohol, wants to get help    History is  obtained from the patient         History of Present Illness:   Salina Khan is a 55 year old female with history of alcohol dependence and 2 prior inpatient treatments presented to the ED via EMS for evaluation of alcohol intoxication.  He called EMS as she had been drinking for the last 4 days straight at least 2 L of vodka a day.  When she has not been eating anything for the last 4 days.  She felt nauseated but no vomiting dry heaving as per the patient.  She is under a lot of stress as she is going through divorce was trying to overcome the stress by drinking alcohol.  She was hypoglycemic with blood sugar level of 54 in the emergency room.  And her lactate was elevated at 6.  She denies any fevers chills no dysuria urgency or frequency no bladder or bowel changes.  She was given food to eat and with that her blood sugar improved to 160.  And  IV fluid boluses were given with IV fluids lactate improved to 4.6.  Her blood alcohol on presentation was 0.30 in the emergency room.  She started withdrawing the emergency room needing IV Ativan.  Patient was tearful in the emergency room about the whole situation but she denied any issues with depression or suicidal ideation.  She wants to get help wants to do outpatient AA for her alcohol use and dependence.  She denied any exposure or symptoms of COVID-19.  Asymptomatic COVID-19 test sent and results are pending.  Ketone test also ordered currently results are pending           Past Medical History:     Past Medical History:   Diagnosis Date     Anxiety      Depressive disorder      Gestational diabetes       x 1     H/O: depression 3039-9789   History of alcohol dependence received 2 inpatient treatments in the past          Past Surgical History:     Past Surgical History:   Procedure Laterality Date     C/SECTION, CLASSICAL      , Classical     TUBAL LIGATION                Home Medications:     Prior to Admission medications    Medication Sig Last  Dose Taking? Auth Provider   traZODone (DESYREL) 50 MG tablet Take 50 mg by mouth nightly as needed for sleep  Yes Unknown, Entered By History   acetaminophen (TYLENOL) 500 MG tablet Take 1,000 mg by mouth every 6 hours as needed    Reported, Patient   calcium citrate (CALCITRATE) 950 MG tablet Take 1 tablet by mouth 3 times daily   Reported, Patient   CYANOCOBALAMIN SL Place 2,500 mcg under the tongue daily Patient is unsure of dose but 2500 mcg is listed in Care Everywhere   Unknown, Entered By History   Iron-Vitamin C (VITRON-C PO) Take 1 tablet by mouth daily   Unknown, Entered By History   lisinopril (ZESTRIL) 20 MG tablet TAKE 1 TABLET(20 MG) BY MOUTH DAILY   Devin eBebe MD   multivitamin  peds with C and FA (FLINTSTONES/MY FIRST) CHEW Take 1 tablet by mouth 2 times daily   Unknown, Entered By History   naltrexone (DEPADE/REVIA) 50 MG tablet TAKE 1 TABLET(50 MG) BY MOUTH DAILY   Devin Beebe MD   VALACYCLOVIR HCL PO Take 1,000 mg by mouth 2 times daily as needed   Unknown, Entered By History   venlafaxine (EFFEXOR-XR) 75 MG 24 hr capsule TAKE 1 CAPSULE(75 MG) BY MOUTH DAILY   Elui Nunes MD   VITAMIN D, CHOLECALCIFEROL, PO Take 1,000 Units by mouth 2 times daily   Unknown, Entered By History            Allergies:     Allergies   Allergen Reactions     Erythromycin Swelling     Other reaction(s): Edema  topical EES: eye swelling  Eye drops        Nsaids Other (See Comments)     Other reaction(s): Other - Describe In Comment Field  Patient had bariatric surgery. Should not ever take NSAIDS due to high risk for gastric ulcers.   Avoid because of gastric by pass surgery            Social History:     Social History     Tobacco Use     Smoking status: Never Smoker     Smokeless tobacco: Never Used   Substance Use Topics     Alcohol use: No     Alcohol/week: 0.0 standard drinks     Frequency: Never     Comment: in recovery from alcoholism, attending AA daily and in an outpatient  evening  "program 3 nights weekly.   . She is a .  She is currently unemployed.  She has 2 children  21 and 16 years of age            Family History:     Family History   Problem Relation Age of Onset     Hypertension Mother      Congenital Anomalies Father         cystic fibrosis  age 70's     Diabetes Father                 Review of Systems:       The 10 point Review of Systems is negative other than noted in the HPI           Physical Exam:   Blood pressure 127/82, pulse 101, temperature 97.9  F (36.6  C), temperature source Oral, resp. rate 19, height 1.575 m (5' 2\"), weight 98.9 kg (218 lb), last menstrual period 10/09/2015, SpO2 96 %, not currently breastfeeding.  218 lbs 0 oz    Constitutional: Alert, awake not in any distress   Psych: Mood and affect are normal    Neuro: Cranial nerves 2-12 are intact, muscle power 5/5, no focal weakness   Eyes: No conjunctival injection, No scleral icterus, PERRLA   ENT: Ear, nose , throat are normal. Mucous membranes moist         Cardiovascular:  Sinus tachycardia, no murmurs rubs or gallops, no lower extremity edema noted   Lungs:  Clear to auscultation, no rales rhonchi or wheezing   Abdomen:  Soft, nontender, nondistended, bowel sounds positive no guarding rigidity or rebound   Skin:  Warm and dry   Musculoskeletal:  No joint tenderness or edema   Other:           Data:   All new lab and imaging data was reviewed.   Recent Labs   Lab Test 10/16/20  1716 18  1345 14  1510 14  1510 13  1456 13  1456   WBC 5.9 5.3   < >  --    < > 6.2   HGB 14.2 13.1   < >  --    < > 12.4   MCV 94 95.9   < >  --    < > 88.5    237   < >  --    < > 292   INR  --   --   --  0.9  --  0.9    < > = values in this interval not displayed.      Results for orders placed or performed during the hospital encounter of 18                          Recent Labs   Lab Test 10/16/20  2018 10/16/20  1716    138   POTASSIUM 4.2 3.8   CHLORIDE 106 100 "   CO2 18* 16*   BUN 13 15   CR 0.56 0.59   ANIONGAP 16* 22*   ROSAURA 7.5* 8.1*   * 56*     No lab results found.    Invalid input(s): TROP, TROPONINIES

## 2020-10-17 NOTE — PROGRESS NOTES
[facility]    Medicine Progress Note - Hospitalist Service       Date of Admission:  10/16/2020  Date of Service: 10/17/2020    Assessment & Plan         Salina Khan is a 55 year old female with history of alcohol dependence and 2 prior inpatient treatments presented to the ED via EMS for evaluation of alcohol intoxication.  He called EMS as she had been drinking for the last 4 days straight at least 2 L of vodka a day.  When she has not been eating anything for the last 4 days.  She felt nauseated but no vomiting dry heaving as per the patient.  She is under a lot of stress as she is going through divorce was trying to overcome the stress by drinking alcohol.  She was hypoglycemic with blood sugar level of 54 in the emergency room.  And her lactate was elevated at 6.     Alcoholic and starvation ketoacidosis;  Lactic acidosis secondary to above;  Patient was drinking alcohol heavily and not eating prior to hospitalization that were contributed to the alcoholic and starvation ketoacidosis  Plan:  Continue with D5 IV normal saline for at 150 mL/h    Alcohol dependence and intoxication  Alcohol withdrawal  Continue on CIWA protocol with Ativan as needed dosing  Multivitamin folate thiamine replacement ordered  CHEM Depp sign psychiatric consulted, see recs ->started patient on trazodone 50 mg p.o. at bedtime to help with depression and sleep. Can be gradually titrated upwards to 100 and 150 mg on an outpatient basis.    -Patient continue the Effexor XR 75 for at least a week     Hypertension;  Continue lisinopril with hold parameters       Diet: Combination Diet Regular Diet Adult    DVT Prophylaxis: Low Risk/Ambulatory with no VTE prophylaxis indicated  Almeida Catheter: not present  Code Status: Full Code           Disposition Plan   Expected discharge: 1-2 days, recommended to prior living arrangement once improvment in withdrawl symptoms.  Entered: Will Perkins MD 10/17/2020, 3:17 PM        The patient's care was discussed with the Bedside Nurse and Patient.    Will Perkins MD  Hospitalist Service  [facility]  Contact information available via [facility] Paging/Directory    ______________________________________________________________________    Interval History     Tearful about alcohol use  No CP/SOB, no nausea/vomiting or abdominal pain  No fevers or chills    Data reviewed today: I reviewed all medications, new labs and imaging results over the last 24 hours. I personally reviewed no images or EKG's today.    Physical Exam   Vital Signs: Temp: 97.7  F (36.5  C) Temp src: Oral BP: 139/88 Pulse: 85   Resp: 18 SpO2: 99 % O2 Device: Nasal cannula Oxygen Delivery: 2 LPM  Weight: 218 lbs 0 oz    Constitutional: awake, alert, cooperative, no apparent distress.   Respiratory: CTABL   Cardiovascular: RRR with no m/r/g   GI: Normal bowel sounds, soft, non-distended, non-tender.   Neurologic: Awake, alert, oriented to name, place and time. Motor is 5 out of 5 bilaterally. Sensory is intact.   Neuropsychiatric: anxious and tearful      Data   Recent Labs   Lab 10/17/20  1237 10/16/20  2018 10/16/20  1716   WBC 5.2  --  5.9   HGB 12.4  --  14.2   MCV 97  --  94     --  241    140 138   POTASSIUM 4.2 4.2 3.8   CHLORIDE 106 106 100   CO2 26 18* 16*   BUN 12 13 15   CR 0.64 0.56 0.59   ANIONGAP 4 16* 22*   ROSAURA 7.9* 7.5* 8.1*   * 116* 56*   ALBUMIN 3.1*  --  3.5   PROTTOTAL 6.6*  --  7.5   BILITOTAL 1.2  --  0.4   ALKPHOS 131  --  149   ALT 31  --  38   AST 32  --  48*     No results found for this or any previous visit (from the past 24 hour(s)).  Medications     dextrose 5% and 0.9% NaCl with potassium chloride 20 mEq 150 mL/hr at 10/17/20 0757       folic acid  1 mg Oral Daily     lisinopril  20 mg Oral Daily     multivitamin w/minerals  1 tablet Oral Daily     pantoprazole (PROTONIX) IV  40 mg Intravenous Daily with breakfast     sodium chloride (PF)  3 mL  Intracatheter Q8H     thiamine  100 mg Oral Daily     traZODone  50 mg Oral At Bedtime

## 2020-10-18 VITALS
TEMPERATURE: 98.1 F | RESPIRATION RATE: 18 BRPM | DIASTOLIC BLOOD PRESSURE: 95 MMHG | BODY MASS INDEX: 40.12 KG/M2 | SYSTOLIC BLOOD PRESSURE: 149 MMHG | OXYGEN SATURATION: 98 % | WEIGHT: 218 LBS | HEART RATE: 81 BPM | HEIGHT: 62 IN

## 2020-10-18 PROCEDURE — C9113 INJ PANTOPRAZOLE SODIUM, VIA: HCPCS | Performed by: INTERNAL MEDICINE

## 2020-10-18 PROCEDURE — 250N000011 HC RX IP 250 OP 636: Performed by: INTERNAL MEDICINE

## 2020-10-18 PROCEDURE — 258N000001 HC RX 258: Performed by: INTERNAL MEDICINE

## 2020-10-18 PROCEDURE — 99239 HOSP IP/OBS DSCHRG MGMT >30: CPT | Performed by: STUDENT IN AN ORGANIZED HEALTH CARE EDUCATION/TRAINING PROGRAM

## 2020-10-18 PROCEDURE — 250N000013 HC RX MED GY IP 250 OP 250 PS 637: Performed by: INTERNAL MEDICINE

## 2020-10-18 RX ORDER — LANOLIN ALCOHOL/MO/W.PET/CERES
100 CREAM (GRAM) TOPICAL DAILY
Qty: 14 TABLET | Refills: 0 | Status: SHIPPED | OUTPATIENT
Start: 2020-10-19 | End: 2020-11-02

## 2020-10-18 RX ORDER — FOLIC ACID 1 MG/1
1 TABLET ORAL DAILY
Qty: 14 TABLET | Refills: 0 | Status: SHIPPED | OUTPATIENT
Start: 2020-10-19 | End: 2021-04-22

## 2020-10-18 RX ORDER — VENLAFAXINE HYDROCHLORIDE 75 MG/1
75 CAPSULE, EXTENDED RELEASE ORAL AT BEDTIME
COMMUNITY
Start: 2020-10-18 | End: 2020-11-02

## 2020-10-18 RX ORDER — TRAZODONE HYDROCHLORIDE 50 MG/1
50 TABLET, FILM COATED ORAL AT BEDTIME
Qty: 30 TABLET | Refills: 0 | Status: SHIPPED | OUTPATIENT
Start: 2020-10-18 | End: 2020-12-09

## 2020-10-18 RX ADMIN — FOLIC ACID 1 MG: 1 TABLET ORAL at 08:36

## 2020-10-18 RX ADMIN — PANTOPRAZOLE SODIUM 40 MG: 40 INJECTION, POWDER, FOR SOLUTION INTRAVENOUS at 08:36

## 2020-10-18 RX ADMIN — MULTIPLE VITAMINS W/ MINERALS TAB 1 TABLET: TAB at 08:36

## 2020-10-18 RX ADMIN — THIAMINE HCL TAB 100 MG 100 MG: 100 TAB at 08:36

## 2020-10-18 RX ADMIN — ACETAMINOPHEN 650 MG: 325 TABLET, FILM COATED ORAL at 03:33

## 2020-10-18 RX ADMIN — POTASSIUM CHLORIDE, DEXTROSE MONOHYDRATE AND SODIUM CHLORIDE: 150; 5; 900 INJECTION, SOLUTION INTRAVENOUS at 06:33

## 2020-10-18 RX ADMIN — LISINOPRIL 20 MG: 20 TABLET ORAL at 08:36

## 2020-10-18 ASSESSMENT — ACTIVITIES OF DAILY LIVING (ADL)
ADLS_ACUITY_SCORE: 13

## 2020-10-18 NOTE — PROGRESS NOTES
Discharge    Patient discharged to Home via Car with Daughter  Care plan note VSS on RA, elevated /95. A&Ox4. Ind Reg diet    Listed belongings gathered and returned to patient. Yes  Care Plan and Patient education resolved: Yes  Prescriptions if needed, hard copies sent with patient  Yes  Home and hospital acquired medications returned to patient: NA  Medication Bin checked and emptied on discharge Yes  Follow up appointment made for patient: No

## 2020-10-18 NOTE — DISCHARGE SUMMARY
[facility]  Hospitalist Discharge Summary      Date of Admission:  10/16/2020  Date of Discharge:  10/18/2020  Discharging Provider: Will Perkins MD      Discharge Diagnoses     Alcoholic and starvation ketoacidosis;    Follow-ups Needed After Discharge   Follow-up Appointments     Follow-up and recommended labs and tests       Follow up with primary care provider, Renita Cordova, within 7 days for   hospital follow- up.  The following labs/tests are recommended: None.           Unresulted Labs Ordered in the Past 30 Days of this Admission     No orders found from 9/16/2020 to 10/17/2020.        Discharge Disposition   Discharged to home  Condition at discharge: Stable    Hospital Course           Salina Khan is a 55 year old female with history of alcohol dependence and 2 prior inpatient treatments presented to the ED via EMS for evaluation of alcohol intoxication.  He called EMS as she had been drinking for the last 4 days straight at least 2 L of vodka a day.  When she has not been eating anything for the last 4 days.  She felt nauseated but no vomiting dry heaving as per the patient.  She is under a lot of stress as she is going through divorce was trying to overcome the stress by drinking alcohol.  She was hypoglycemic with blood sugar level of 54 in the emergency room.  And her lactate was elevated at 6.     Alcoholic and starvation ketoacidosis;  Lactic acidosis secondary to above;  Patient was drinking alcohol heavily and not eating prior to hospitalization that were contributed to the alcoholic and starvation ketoacidosis. Improved with IVF.    Alcohol dependence and intoxication  Alcohol withdrawal  Continue on CIWA protocol with Ativan as needed dosing  Multivitamin folate thiamine replacement ordered  CHEM Depp sign psychiatric consulted, see recs ->started patient on trazodone 50 mg p.o. at bedtime to help with depression and sleep. Can be gradually titrated upwards to 100  and 150 mg on an outpatient basis.    -Patient continue the Effexor XR 75 for a week then stop     Hypertension;  Continue lisinopril       Consultations This Hospital Stay   PSYCHIATRY IP CONSULT  CHEMICAL DEPENDENCY IP CONSULT  CARE MANAGEMENT / SOCIAL WORK IP CONSULT    Code Status   Full Code    Time Spent on this Encounter   I, Will Perkins MD, personally saw the patient today and spent greater than 30 minutes discharging this patient.       Will Perkins MD  [place] [address] [address] [address]  [facility] [place] [address]  Phone: [phone]  ______________________________________________________________________    Physical Exam   Vital Signs: Temp: 98.1  F (36.7  C) Temp src: Oral BP: (!) 149/95 Pulse: 81   Resp: 18 SpO2: 98 % O2 Device: None (Room air)    Weight: 218 lbs 0 oz       Primary Care Physician   Renita Cordova    Discharge Orders      Reason for your hospital stay    You had alcohol intoxication and severe dehydration and withdrawal symptoms that needed management. You were evaluated by our Psychiatry and chemical dependency teams.     Follow-up and recommended labs and tests     Follow up with primary care provider, Renita Cordova, within 7 days for hospital follow- up.  The following labs/tests are recommended: None.     Activity    Your activity upon discharge: activity as tolerated     Diet    Follow this diet upon discharge: Orders Placed This Encounter      Combination Diet Regular Diet Adult       Significant Results and Procedures   Most Recent 3 CBC's:  Recent Labs   Lab Test 10/17/20  1237 10/16/20  1716 07/18/18  1345   WBC 5.2 5.9 5.3   HGB 12.4 14.2 13.1   MCV 97 94 95.9    241 237     Most Recent 3 BMP's:  Recent Labs   Lab Test 10/17/20  1237 10/16/20  2018 10/16/20  1716    140 138   POTASSIUM 4.2 4.2 3.8   CHLORIDE 106 106 100   CO2 26 18* 16*   BUN 12 13 15   CR 0.64 0.56 0.59   ANIONGAP 4 16* 22*   ROSAURA 7.9* 7.5* 8.1*   * 116* 56*      Most Recent 2 LFT's:  Recent Labs   Lab Test 10/17/20  1237 10/16/20  1716   AST 32 48*   ALT 31 38   ALKPHOS 131 149   BILITOTAL 1.2 0.4     Most Recent 3 INR's:  Recent Labs   Lab Test 02/21/14  1510 08/16/13  1456   INR 0.9 0.9   ,   Results for orders placed or performed during the hospital encounter of 06/25/18   CT Abdomen Pelvis w Contrast    Narrative    CT ABDOMEN/PELVIS WITH CONTRAST 6/25/2018 4:54 AM     HISTORY: Midabdominal pain, nausea. diarrhea. History of gastric  bypass.     TECHNIQUE: Volumetric acquisition through abdomen and pelvis with  IV  contrast,  113 mL Isovue-370.  Radiation dose for this scan was  reduced using automated exposure control, adjustment of the mA and/or  kV according to patient size, or iterative reconstruction technique.    COMPARISON: None.    FINDINGS: Gallbladder is absent. Liver, spleen, pancreas, adrenal  glands and kidneys demonstrate no worrisome findings. Gastric bypass.    Multiple dilated mid to distal small bowel loops extending to the  level of the terminal ileum. The terminal ileum is slightly  thick-walled and there is moderate impacted fecal material in the  distal ileum at the level of the ileocecal junction. No other cause  for obstruction is seen. Small amount of ascites. Mild mesenteric  stranding and edema in the lower abdomen and pelvis. Multiple proximal  small bowel loops are nondilated.    Uterus is present. No suspicious pelvic masses. No pneumatosis or free  air.      Impression    IMPRESSION:  1. Mechanical small bowel obstruction. Moderate fecal material  impacted in the distal ileum. This could be secondary to the  obstruction or the cause of obstruction.  2. Slight wall thickening/edema of the distal ileum.  3. Small amount of ascites.    TERESA DIXON MD       Discharge Medications   Current Discharge Medication List      START taking these medications    Details   folic acid (FOLVITE) 1 MG tablet Take 1 tablet (1 mg) by mouth  daily  Qty: 14 tablet, Refills: 0    Associated Diagnoses: Alcoholic ketoacidosis      thiamine (B-1) 100 MG tablet Take 1 tablet (100 mg) by mouth daily for 14 days  Qty: 14 tablet, Refills: 0    Associated Diagnoses: Alcoholic ketoacidosis      traZODone (DESYREL) 50 MG tablet Take 1 tablet (50 mg) by mouth At Bedtime  Qty: 30 tablet, Refills: 0    Associated Diagnoses: Adjustment disorder with depressed mood         CONTINUE these medications which have CHANGED    Details   venlafaxine (EFFEXOR-XR) 75 MG 24 hr capsule Take 1 capsule (75 mg) by mouth At Bedtime    Associated Diagnoses: JASON (generalized anxiety disorder); Recurrent major depressive disorder, in full remission (H)         CONTINUE these medications which have NOT CHANGED    Details   acetaminophen (TYLENOL) 500 MG tablet Take 1,000 mg by mouth every 6 hours as needed for mild pain      lisinopril (ZESTRIL) 20 MG tablet TAKE 1 TABLET(20 MG) BY MOUTH DAILY  Qty: 90 tablet, Refills: 1    Associated Diagnoses: Benign essential hypertension           Allergies   Allergies   Allergen Reactions     Erythromycin Swelling     Other reaction(s): Edema  topical EES: eye swelling  Eye drops        Nsaids Other (See Comments)     Other reaction(s): Other - Describe In Comment Field  Patient had bariatric surgery. Should not ever take NSAIDS due to high risk for gastric ulcers.   Avoid because of gastric by pass surgery

## 2020-10-18 NOTE — PLAN OF CARE
DATE & TIME: 10/17/20 4088-0167  Cognitive Concerns/ Orientation : A&Ox4  BEHAVIOR & AGGRESSION TOOL COLOR: Jenni MENDENHALLWA SCORE: 0, 2, headache and anxiety.  ABNL VS/O2: VSS on RA  MOBILITY: IND  PAIN MANAGMENT: PRN Tylenol given x1 for headache.  DIET: Reg  BOWEL/BLADDER: Up to BR  ABNL LAB/BG: Ketone 2.9  DRAIN/DEVICES: PIV infusing at 150ml  TELEMETRY RHYTHM: ST  SKIN: Intact  TESTS/PROCEDURES:   D/C DAY/GOALS/PLACE: Pending  OTHER IMPORTANT INFO: Psych and Chem dep consulted.

## 2020-10-18 NOTE — PLAN OF CARE
DATE & TIME: 10/17/20 6715-1093  Cognitive Concerns/ Orientation : A/Ox4, anxious at times, atarax given x1  BEHAVIOR & AGGRESSION TOOL COLOR: Jenni BURKS SCORE: 2,2,3,2 for headache and anxiety.  ABNL VS/O2: AVSS on RA ex tachycardic at times  MOBILITY: independent to BR  PAIN MANAGMENT: c/o headache, tylenol given x3  DIET: Regular diet, good appetite  BOWEL/BLADDER: continent of bowel/bladder  ABNL LAB/BG:   DRAIN/DEVICES: PIV infusing D5 1/2NS + KCL at 150ml/hr  TELEMETRY RHYTHM: SR/ST  SKIN: Intact  TESTS/PROCEDURES: n/a  D/C DAY/GOALS/PLACE: Pending  OTHER IMPORTANT INFO: started pt on trazodone this evening per psych, CD and SW following

## 2020-12-09 DIAGNOSIS — F43.21 ADJUSTMENT DISORDER WITH DEPRESSED MOOD: ICD-10-CM

## 2020-12-09 RX ORDER — TRAZODONE HYDROCHLORIDE 50 MG/1
50 TABLET, FILM COATED ORAL AT BEDTIME
Qty: 30 TABLET | Refills: 0 | Status: SHIPPED | OUTPATIENT
Start: 2020-12-09 | End: 2021-01-11

## 2020-12-10 DIAGNOSIS — F41.1 GAD (GENERALIZED ANXIETY DISORDER): ICD-10-CM

## 2020-12-10 DIAGNOSIS — F33.42 RECURRENT MAJOR DEPRESSIVE DISORDER, IN FULL REMISSION (H): ICD-10-CM

## 2020-12-10 RX ORDER — VENLAFAXINE HYDROCHLORIDE 75 MG/1
75 CAPSULE, EXTENDED RELEASE ORAL DAILY
Qty: 30 CAPSULE | Refills: 0 | Status: SHIPPED | OUTPATIENT
Start: 2020-12-10 | End: 2021-01-11

## 2020-12-10 NOTE — TELEPHONE ENCOUNTER
Venlafaxine refilled 1 month. Needs appointment for further refills.    URSULA Hardin CNP on 12/10/2020 at 1:58 PM

## 2021-01-09 ENCOUNTER — HEALTH MAINTENANCE LETTER (OUTPATIENT)
Age: 56
End: 2021-01-09

## 2021-01-11 ENCOUNTER — TELEPHONE (OUTPATIENT)
Dept: FAMILY MEDICINE | Facility: CLINIC | Age: 56
End: 2021-01-11

## 2021-01-11 DIAGNOSIS — F41.1 GAD (GENERALIZED ANXIETY DISORDER): ICD-10-CM

## 2021-01-11 DIAGNOSIS — F33.42 RECURRENT MAJOR DEPRESSIVE DISORDER, IN FULL REMISSION (H): ICD-10-CM

## 2021-01-11 DIAGNOSIS — F43.21 ADJUSTMENT DISORDER WITH DEPRESSED MOOD: ICD-10-CM

## 2021-01-11 RX ORDER — VENLAFAXINE HYDROCHLORIDE 75 MG/1
75 CAPSULE, EXTENDED RELEASE ORAL DAILY
Qty: 90 CAPSULE | Refills: 0 | Status: SHIPPED | OUTPATIENT
Start: 2021-01-11 | End: 2021-04-21

## 2021-01-11 RX ORDER — TRAZODONE HYDROCHLORIDE 50 MG/1
50 TABLET, FILM COATED ORAL AT BEDTIME
Qty: 90 TABLET | Refills: 0 | Status: SHIPPED | OUTPATIENT
Start: 2021-01-11 | End: 2021-04-21

## 2021-01-11 NOTE — TELEPHONE ENCOUNTER
I'm okay refilling both Venlafaxine & Trazodone for 3 months and then she needs appointment.    URSULA Hardin CNP on 1/11/2021 at 11:00 AM

## 2021-01-11 NOTE — TELEPHONE ENCOUNTER
"Patient calls requesting refill on venlafaxine 75mg every day and trazodone 50mg. Took last pill of venlafaxine last night and will get sick if stops suddenly.   Also taking trazodone 50mg q hs and sleeping well.   Mood = \"good\". States sober x 90 days with meetings and sponsor.   States unable to RTC due to still unemployed and no insurance. divorce was final 12/11/20 and is working with [employer] on insurance. Asks for another month or two of med while gets insurance straightened out.   Last visit: 4/14/20 virtual with Dr. Beebe     It is noted on 10/16/20 psych note and ER discharge summary that patient thought venlafaxine not working so trazodone 50mg q hs (titrate up to 150 if needed) was ordered along with decrease venlafaxine and stop it in 7 days. Mentioned this to patient today, states was not aware of this plan and feels doing well on current regimen but will change meds if necessary.             "

## 2021-01-11 NOTE — TELEPHONE ENCOUNTER
Called patient and informed Renita Cordova CNP approved 90 day supply but needs appt during that time. Patient agrees and is appreciative. Commended and encouraged continued sobriety and to call if has any needs or questions.  Rx's sent.  Linda Larsen RN

## 2021-04-21 DIAGNOSIS — F41.1 GAD (GENERALIZED ANXIETY DISORDER): ICD-10-CM

## 2021-04-21 DIAGNOSIS — F43.21 ADJUSTMENT DISORDER WITH DEPRESSED MOOD: ICD-10-CM

## 2021-04-21 DIAGNOSIS — F33.42 RECURRENT MAJOR DEPRESSIVE DISORDER, IN FULL REMISSION (H): ICD-10-CM

## 2021-04-21 RX ORDER — VENLAFAXINE HYDROCHLORIDE 75 MG/1
75 CAPSULE, EXTENDED RELEASE ORAL DAILY
Qty: 30 CAPSULE | Refills: 0 | Status: SHIPPED | OUTPATIENT
Start: 2021-04-21 | End: 2021-04-22

## 2021-04-21 RX ORDER — TRAZODONE HYDROCHLORIDE 50 MG/1
50 TABLET, FILM COATED ORAL AT BEDTIME
Qty: 30 TABLET | Refills: 0 | Status: SHIPPED | OUTPATIENT
Start: 2021-04-21 | End: 2021-04-22

## 2021-04-22 ENCOUNTER — VIRTUAL VISIT (OUTPATIENT)
Dept: FAMILY MEDICINE | Facility: CLINIC | Age: 56
End: 2021-04-22

## 2021-04-22 VITALS — SYSTOLIC BLOOD PRESSURE: 139 MMHG | DIASTOLIC BLOOD PRESSURE: 83 MMHG

## 2021-04-22 DIAGNOSIS — F41.1 GAD (GENERALIZED ANXIETY DISORDER): ICD-10-CM

## 2021-04-22 DIAGNOSIS — F33.42 RECURRENT MAJOR DEPRESSIVE DISORDER, IN FULL REMISSION (H): ICD-10-CM

## 2021-04-22 DIAGNOSIS — I10 BENIGN ESSENTIAL HYPERTENSION: ICD-10-CM

## 2021-04-22 DIAGNOSIS — F43.21 ADJUSTMENT DISORDER WITH DEPRESSED MOOD: ICD-10-CM

## 2021-04-22 PROCEDURE — 99213 OFFICE O/P EST LOW 20 MIN: CPT | Mod: GT | Performed by: FAMILY MEDICINE

## 2021-04-22 RX ORDER — LISINOPRIL 20 MG/1
20 TABLET ORAL DAILY
Qty: 90 TABLET | Refills: 1 | Status: SHIPPED | OUTPATIENT
Start: 2021-04-22 | End: 2022-02-21

## 2021-04-22 RX ORDER — TRAZODONE HYDROCHLORIDE 50 MG/1
50 TABLET, FILM COATED ORAL AT BEDTIME
Qty: 90 TABLET | Refills: 1 | Status: SHIPPED | OUTPATIENT
Start: 2021-04-22 | End: 2021-11-10

## 2021-04-22 RX ORDER — VENLAFAXINE HYDROCHLORIDE 75 MG/1
75 CAPSULE, EXTENDED RELEASE ORAL DAILY
Qty: 90 CAPSULE | Refills: 1 | Status: SHIPPED | OUTPATIENT
Start: 2021-04-22 | End: 2021-11-10

## 2021-04-22 ASSESSMENT — ANXIETY QUESTIONNAIRES
6. BECOMING EASILY ANNOYED OR IRRITABLE: NOT AT ALL
3. WORRYING TOO MUCH ABOUT DIFFERENT THINGS: NOT AT ALL
5. BEING SO RESTLESS THAT IT IS HARD TO SIT STILL: NOT AT ALL
2. NOT BEING ABLE TO STOP OR CONTROL WORRYING: NOT AT ALL
GAD7 TOTAL SCORE: 0
1. FEELING NERVOUS, ANXIOUS, OR ON EDGE: NOT AT ALL
7. FEELING AFRAID AS IF SOMETHING AWFUL MIGHT HAPPEN: NOT AT ALL

## 2021-04-22 ASSESSMENT — PATIENT HEALTH QUESTIONNAIRE - PHQ9
SUM OF ALL RESPONSES TO PHQ QUESTIONS 1-9: 0
5. POOR APPETITE OR OVEREATING: NOT AT ALL

## 2021-04-22 NOTE — LETTER
My Asthma Action Plan    Name: Salina Khan   YOB: 1965  Date: 4/22/2021   My doctor: Devin Beebe MD   My clinic: [facility]        My Rescue Medicine:   Albuterol inhaler (Proair/Ventolin/Proventil HFA)  2-4 puffs EVERY 4 HOURS as needed. Use a spacer if recommended by your provider.   My Asthma Severity:   Intermittent / Exercise Induced  Know your asthma triggers: Patient is unaware of triggers             GREEN ZONE   Good Control    I feel good    No cough or wheeze    Can work, sleep and play without asthma symptoms       Take your asthma control medicine every day.     1. If exercise triggers your asthma, take your rescue medication    15 minutes before exercise or sports, and    During exercise if you have asthma symptoms  2. Spacer to use with inhaler: If you have a spacer, make sure to use it with your inhaler             YELLOW ZONE Getting Worse  I have ANY of these:    I do not feel good    Cough or wheeze    Chest feels tight    Wake up at night   1. Keep taking your Green Zone medications  2. Start taking your rescue medicine:    every 20 minutes for up to 1 hour. Then every 4 hours for 24-48 hours.  3. If you stay in the Yellow Zone for more than 12-24 hours, contact your doctor.  4. If you do not return to the Green Zone in 12-24 hours or you get worse, start taking your oral steroid medicine if prescribed by your provider.           RED ZONE Medical Alert - Get Help  I have ANY of these:    I feel awful    Medicine is not helping    Breathing getting harder    Trouble walking or talking    Nose opens wide to breathe       1. Take your rescue medicine NOW  2. If your provider has prescribed an oral steroid medicine, start taking it NOW  3. Call your doctor NOW  4. If you are still in the Red Zone after 20 minutes and you have not reached your doctor:    Take your rescue medicine again and    Call 911 or go to the emergency room right away    See your regular  doctor within 2 weeks of an Emergency Room or Urgent Care visit for follow-up treatment.          Annual Reminders:  Meet with Asthma Educator,  Flu Shot in the Fall, consider Pneumonia Vaccination for patients with asthma (aged 19 and older).    Pharmacy[facility]    [id number] - [facility] [address] [place] [place] [address] [facility] [address] - [place], [place] - [address]  [facility] [place] - [address]    Electronically signed by Devin Beebe MD   Date: 04/22/21                    Asthma Triggers  How To Control Things That Make Your Asthma Worse    Triggers are things that make your asthma worse.  Look at the list below to help you find your triggers and   what you can do about them. You can help prevent asthma flare-ups by staying away from your triggers.      Trigger                                                          What you can do   Cigarette Smoke  Tobacco smoke can make asthma worse. Do not allow smoking in your home, car or around you.  Be sure no one smokes at a child s day care or school.  If you smoke, ask your health care provider for ways to help you quit.  Ask family members to quit too.  Ask your health care provider for a referral to Quit Plan to help you quit smoking, or call 1-238-511-PLAN.     Colds, Flu, Bronchitis  These are common triggers of asthma. Wash your hands often.  Don t touch your eyes, nose or mouth.  Get a flu shot every year.     Dust Mites  These are tiny bugs that live in cloth or carpet. They are too small to see. Wash sheets and blankets in hot water every week.   Encase pillows and mattress in dust mite proof covers.  Avoid having carpet if you can. If you have carpet, vacuum weekly.   Use a dust mask and HEPA vacuum.   Pollen and Outdoor Mold  Some people are allergic to trees, grass, or weed pollen, or molds. Try to keep your windows closed.  Limit time out doors when pollen count is high.   Ask you  health care provider about taking medicine during allergy season.     Animal Dander  Some people are allergic to skin flakes, urine or saliva from pets with fur or feathers. Keep pets with fur or feathers out of your home.    If you can t keep the pet outdoors, then keep the pet out of your bedroom.  Keep the bedroom door closed.  Keep pets off cloth furniture and away from stuffed toys.     Mice, Rats, and Cockroaches  Some people are allergic to the waste from these pests.   Cover food and garbage.  Clean up spills and food crumbs.  Store grease in the refrigerator.   Keep food out of the bedroom.   Indoor Mold  This can be a trigger if your home has high moisture. Fix leaking faucets, pipes, or other sources of water.   Clean moldy surfaces.  Dehumidify basement if it is damp and smelly.   Smoke, Strong Odors, and Sprays  These can reduce air quality. Stay away from strong odors and sprays, such as perfume, powder, hair spray, paints, smoke incense, paint, cleaning products, candles and new carpet.   Exercise or Sports  Some people with asthma have this trigger. Be active!  Ask your doctor about taking medicine before sports or exercise to prevent symptoms.    Warm up for 5-10 minutes before and after sports or exercise.     Other Triggers of Asthma  Cold air:  Cover your nose and mouth with a scarf.  Sometimes laughing or crying can be a trigger.  Some medicines and food can trigger asthma.

## 2021-04-22 NOTE — PROGRESS NOTES
"    Video Problem(s) Oriented visit      Video Start Time: 8:30 AM    The patient has been notified of following:     \"This video visit will be conducted via a call between you and your physician/provider. We have found that certain health care needs can be provided without the need for an in-person physical exam.  This service lets us provide the care you need with a video conversation.  If a prescription is necessary we can send it directly to your pharmacy.  If lab work is needed we can place an order for that and you can then stop by our lab to have the test done at a later time.    Video visits are billed at different rates depending on your insurance coverage.  Please reach out to your insurance provider with any questions.    If during the course of the call the physician/provider feels a video visit is not appropriate, you will not be charged for this service.\"    Patient has given verbal consent for Video visit? Yes    How would you like to obtain your AVS? MyChart    Will anyone else be joining your video visit? No  SUBJECTIVE:                                                    Salina Khan is a 55 year old female who presents to clinic today for the following health issues :      Depression:  Has known history of depression.  Has been on medication for this.  The patient does not report any significant side effects of this medication.  The prior symptoms leading to the original diagnosis and decision to start medication therapy are better.     Appetite is stable.  Sleeping patterns are stable.  No reported thoughts of suicide or homicide.    Last 3 PHQ9 results:  PHQ-9 SCORE 12/27/2018 1/15/2019 3/17/2020 4/22/2021   PHQ-9 Total Score 4 0 9 0       Essential Hypertension   Remains well controlled when checked out of clinic.   she has not experienced any significant side effects from medications for hypertension.    NO active cardiac complaints or symptoms with exercise.  Current medications for " treatment:  ACE inhibitors (Lisinopril, Ramipril,Captopril,Benazepril)    Reviewed last 6 BP readings in chart:  BP Readings from Last 6 Encounters:   21 139/83   10/18/20 (!) 149/95   20 (!) 134/92   01/15/19 118/84   18 152/88   18 152/84           Problem list, Medication list, Allergies, and Medical/Social/Surgical histories reviewed in [facility] and updated as appropriate.   Additional history: as documented    ROS:  General and Resp. completed and negative except as noted above    Histories:   Patient Active Problem List   Diagnosis     Gestational diabetes     Health Care Home     Essential hypertension     Recurrent major depressive disorder, in full remission (H)     Small bowel obstruction (H)     Morbid obesity (H)     Alcoholic ketoacidosis     Alcohol withdrawal (H)     Past Surgical History:   Procedure Laterality Date     C/SECTION, CLASSICAL      , Classical     TUBAL LIGATION         Social History     Tobacco Use     Smoking status: Never Smoker     Smokeless tobacco: Never Used   Substance Use Topics     Alcohol use: No     Alcohol/week: 0.0 standard drinks     Frequency: Never     Comment: in recovery from alcoholism, attending AA daily and in an outpatient  evening program 3 nights weekly.     Family History   Problem Relation Age of Onset     Hypertension Mother      Congenital Anomalies Father         cystic fibrosis  age 70's     Diabetes Father            OBJECTIVE:                                                    /83   LMP 10/09/2015 (Approximate)   There is no height or weight on file to calculate BMI.   APPEARANCE: = Relaxed and in no distress     ASSESSMENT/PLAN:                                                        Salina was seen today for recheck medication.    Diagnoses and all orders for this visit:    JASON (generalized anxiety disorder)  Spoke at length about mood disorders including suspected pathophysiology, role of neurotransmitters, and  treatment options including medication options (SSRIs that treat cause of sx and Benzos which treat the sx.) and counselling.    -     venlafaxine (EFFEXOR-XR) 75 MG 24 hr capsule; Take 1 capsule (75 mg) by mouth daily    Recurrent major depressive disorder, in full remission (H)  Spoke at length about mood disorders including suspected pathophysiology, role of neurotransmitters, and treatment options including medication options ( especially SSRIs that treat cause of sx) and counselling.  Tolerating current meds. We discussed ongoing management of her  medications and how we will refill the medications now and in the future.    -     venlafaxine (EFFEXOR-XR) 75 MG 24 hr capsule; Take 1 capsule (75 mg) by mouth daily    Adjustment disorder with depressed mood    -     traZODone (DESYREL) 50 MG tablet; Take 1 tablet (50 mg) by mouth At Bedtime    Benign essential hypertension  Reviewed her current HTN management. Discussed our goal for her is a systolic pressure at or below 128 and diastolic pressure at or below 83.  We today managed her prescriptions with refills ensured to ensure availabilty of current medications.  Discussed the importance for aggressive management of HTN to prevent vascular complications later.  Recommended lower fat, lower carbohydrate, and lower sodium (<2000 mg)diet. Required intervals for follow up on HTN, lab studies reviewed.    Strongly recommened she follow her blood pressures outside the clinic to ensure that BPs are remaining within guidelines,.  Instructed to contact me if the readings are not within guidelines on a regular basis so we can adjust treatment as needed.    -     lisinopril (ZESTRIL) 20 MG tablet; Take 1 tablet (20 mg) by mouth daily        MEDICATIONS:  Continue current medications without change  There are no Patient Instructions on file for this visit.    The following health maintenance items are reviewed in Epic and correct as of today:  Health Maintenance   Topic  Date Due     ASTHMA CONTROL TEST  Never done     ADVANCE CARE PLANNING  Never done     MAMMO SCREENING  Never done     Pneumococcal Vaccine: Pediatrics (0 to 5 Years) and At-Risk Patients (6 to 64 Years) (1 of 2 - PPSV23) Never done     COLORECTAL CANCER SCREENING  Never done     HIV SCREENING  Never done     COVID-19 Vaccine (1) Never done     HEPATITIS C SCREENING  Never done     ZOSTER IMMUNIZATION (1 of 2) Never done     PAP  10/27/2018     PREVENTIVE CARE VISIT  01/15/2020     DTAP/TDAP/TD IMMUNIZATION (3 - Td) 01/26/2020     JAOSN ASSESSMENT  09/17/2020     INFLUENZA VACCINE (1) 06/30/2021 (Originally 9/1/2020)     PHQ-9  10/22/2021     ASTHMA ACTION PLAN  04/22/2022     LIPID  12/27/2023     DEPRESSION ACTION PLAN  Completed     IPV IMMUNIZATION  Aged Out     MENINGITIS IMMUNIZATION  Aged Out     HEPATITIS B IMMUNIZATION  Aged Out       Video-Visit Details    This visit is being conducted as a virtual visit due to the emphasis on mitigation of the COVID-19 virus pandemic.   Type of service:  Video Visit    Originating Location (pt. Location): Home    Distant Location (provider location):  [facility]     Platform used for Video Visit: Unable to connect with video, audio only available    Devin Beebe MD  [facility]  Family Practice  [facility]  [phone]    Video End Time:8:54 AM  For any issues my office # is [phone]

## 2021-04-23 ASSESSMENT — ASTHMA QUESTIONNAIRES: ACT_TOTALSCORE: 25

## 2021-04-23 ASSESSMENT — ANXIETY QUESTIONNAIRES: GAD7 TOTAL SCORE: 0

## 2021-05-08 ENCOUNTER — HEALTH MAINTENANCE LETTER (OUTPATIENT)
Age: 56
End: 2021-05-08

## 2021-09-29 ENCOUNTER — TELEPHONE (OUTPATIENT)
Dept: FAMILY MEDICINE | Facility: CLINIC | Age: 56
End: 2021-09-29

## 2021-10-23 ENCOUNTER — HEALTH MAINTENANCE LETTER (OUTPATIENT)
Age: 56
End: 2021-10-23

## 2021-11-10 DIAGNOSIS — F41.1 GAD (GENERALIZED ANXIETY DISORDER): ICD-10-CM

## 2021-11-10 DIAGNOSIS — F33.42 RECURRENT MAJOR DEPRESSIVE DISORDER, IN FULL REMISSION (H): ICD-10-CM

## 2021-11-10 DIAGNOSIS — F43.21 ADJUSTMENT DISORDER WITH DEPRESSED MOOD: ICD-10-CM

## 2021-11-10 RX ORDER — VENLAFAXINE HYDROCHLORIDE 75 MG/1
CAPSULE, EXTENDED RELEASE ORAL
Qty: 90 CAPSULE | Refills: 1 | Status: SHIPPED | OUTPATIENT
Start: 2021-11-10 | End: 2022-10-20

## 2021-11-10 RX ORDER — TRAZODONE HYDROCHLORIDE 50 MG/1
TABLET, FILM COATED ORAL
Qty: 90 TABLET | Refills: 1 | Status: SHIPPED | OUTPATIENT
Start: 2021-11-10 | End: 2022-07-06

## 2022-03-23 DIAGNOSIS — I10 BENIGN ESSENTIAL HYPERTENSION: ICD-10-CM

## 2022-03-23 RX ORDER — LISINOPRIL 20 MG/1
20 TABLET ORAL DAILY
Qty: 30 TABLET | Refills: 0 | Status: SHIPPED | OUTPATIENT
Start: 2022-03-23 | End: 2022-08-16

## 2022-06-04 ENCOUNTER — HEALTH MAINTENANCE LETTER (OUTPATIENT)
Age: 57
End: 2022-06-04

## 2022-07-06 DIAGNOSIS — F43.21 ADJUSTMENT DISORDER WITH DEPRESSED MOOD: ICD-10-CM

## 2022-07-06 RX ORDER — TRAZODONE HYDROCHLORIDE 50 MG/1
TABLET, FILM COATED ORAL
Qty: 90 TABLET | Refills: 1 | Status: SHIPPED | OUTPATIENT
Start: 2022-07-06 | End: 2022-07-18

## 2022-07-17 DIAGNOSIS — F43.21 ADJUSTMENT DISORDER WITH DEPRESSED MOOD: ICD-10-CM

## 2022-07-18 RX ORDER — TRAZODONE HYDROCHLORIDE 50 MG/1
TABLET, FILM COATED ORAL
Qty: 90 TABLET | Refills: 1 | Status: SHIPPED | OUTPATIENT
Start: 2022-07-18 | End: 2022-10-20

## 2022-08-12 ENCOUNTER — TELEPHONE (OUTPATIENT)
Dept: FAMILY MEDICINE | Facility: CLINIC | Age: 57
End: 2022-08-12

## 2022-08-12 PROBLEM — E87.29 ALCOHOLIC KETOACIDOSIS: Status: RESOLVED | Noted: 2020-10-16 | Resolved: 2022-08-12

## 2022-08-12 PROBLEM — K56.609 SMALL BOWEL OBSTRUCTION (H): Status: RESOLVED | Noted: 2018-06-25 | Resolved: 2022-08-12

## 2022-08-12 PROBLEM — F10.21 HISTORY OF ALCOHOL DEPENDENCE (H): Status: ACTIVE | Noted: 2020-10-16

## 2022-08-12 NOTE — TELEPHONE ENCOUNTER
Patient did not present for appointment this morning. 2nd successive no show.     Called patient to discuss barriers to attending care : struggling with alcoholism and planning on going back into treatment. Has difficulty attending appointments in the morning.     Shared decision to reschedule for next week at around noontime.     Appointments in Next Year    Aug 16, 2022  1:00 PM  PHYSICAL with Kaylynn Ramos MD  [facility] ([facility] ) [phone]

## 2022-08-14 PROBLEM — F10.20 ALCOHOL DEPENDENCE, CONTINUOUS (H): Status: ACTIVE | Noted: 2020-10-16

## 2022-08-16 ENCOUNTER — OFFICE VISIT (OUTPATIENT)
Dept: FAMILY MEDICINE | Facility: CLINIC | Age: 57
End: 2022-08-16

## 2022-08-16 VITALS
RESPIRATION RATE: 16 BRPM | BODY MASS INDEX: 46.7 KG/M2 | OXYGEN SATURATION: 97 % | HEIGHT: 62 IN | HEART RATE: 86 BPM | TEMPERATURE: 97.1 F | WEIGHT: 253.8 LBS | SYSTOLIC BLOOD PRESSURE: 148 MMHG | DIASTOLIC BLOOD PRESSURE: 98 MMHG

## 2022-08-16 DIAGNOSIS — F41.1 GAD (GENERALIZED ANXIETY DISORDER): ICD-10-CM

## 2022-08-16 DIAGNOSIS — I10 BENIGN ESSENTIAL HYPERTENSION: ICD-10-CM

## 2022-08-16 DIAGNOSIS — Z98.84 S/P BARIATRIC SURGERY: ICD-10-CM

## 2022-08-16 DIAGNOSIS — I10 ESSENTIAL HYPERTENSION: ICD-10-CM

## 2022-08-16 DIAGNOSIS — E66.01 MORBID OBESITY (H): ICD-10-CM

## 2022-08-16 DIAGNOSIS — F10.20 ALCOHOL DEPENDENCE, CONTINUOUS (H): ICD-10-CM

## 2022-08-16 DIAGNOSIS — Z11.59 NEED FOR HEPATITIS C SCREENING TEST: ICD-10-CM

## 2022-08-16 DIAGNOSIS — Z11.52 ENCOUNTER FOR SCREENING FOR COVID-19: ICD-10-CM

## 2022-08-16 DIAGNOSIS — R80.9 POSITIVE FOR MACROALBUMINURIA: ICD-10-CM

## 2022-08-16 DIAGNOSIS — F33.41 RECURRENT MAJOR DEPRESSIVE DISORDER, IN PARTIAL REMISSION (H): ICD-10-CM

## 2022-08-16 DIAGNOSIS — R87.612 LGSIL ON PAP SMEAR OF CERVIX: ICD-10-CM

## 2022-08-16 DIAGNOSIS — Z12.31 BREAST CANCER SCREENING BY MAMMOGRAM: ICD-10-CM

## 2022-08-16 DIAGNOSIS — Z12.4 CERVICAL CANCER SCREENING: ICD-10-CM

## 2022-08-16 DIAGNOSIS — Z12.12 SCREENING FOR COLORECTAL CANCER: ICD-10-CM

## 2022-08-16 DIAGNOSIS — Z00.00 ANNUAL PHYSICAL EXAM: Primary | ICD-10-CM

## 2022-08-16 DIAGNOSIS — Z11.4 ENCOUNTER FOR SCREENING FOR HIV: ICD-10-CM

## 2022-08-16 DIAGNOSIS — Z12.11 SCREENING FOR COLORECTAL CANCER: ICD-10-CM

## 2022-08-16 PROCEDURE — 99396 PREV VISIT EST AGE 40-64: CPT | Performed by: FAMILY MEDICINE

## 2022-08-16 PROCEDURE — 82044 UR ALBUMIN SEMIQUANTITATIVE: CPT | Performed by: FAMILY MEDICINE

## 2022-08-16 PROCEDURE — 99213 OFFICE O/P EST LOW 20 MIN: CPT | Mod: 25 | Performed by: FAMILY MEDICINE

## 2022-08-16 RX ORDER — LISINOPRIL 20 MG/1
20 TABLET ORAL DAILY
Qty: 90 TABLET | Refills: 3 | Status: SHIPPED | OUTPATIENT
Start: 2022-08-16 | End: 2024-04-09 | Stop reason: ALTCHOICE

## 2022-08-16 RX ORDER — VENLAFAXINE HYDROCHLORIDE 150 MG/1
150 CAPSULE, EXTENDED RELEASE ORAL DAILY
COMMUNITY
Start: 2022-07-12 | End: 2023-06-22

## 2022-08-16 ASSESSMENT — ANXIETY QUESTIONNAIRES
GAD7 TOTAL SCORE: 5
6. BECOMING EASILY ANNOYED OR IRRITABLE: SEVERAL DAYS
1. FEELING NERVOUS, ANXIOUS, OR ON EDGE: SEVERAL DAYS
7. FEELING AFRAID AS IF SOMETHING AWFUL MIGHT HAPPEN: NOT AT ALL
5. BEING SO RESTLESS THAT IT IS HARD TO SIT STILL: NOT AT ALL
IF YOU CHECKED OFF ANY PROBLEMS ON THIS QUESTIONNAIRE, HOW DIFFICULT HAVE THESE PROBLEMS MADE IT FOR YOU TO DO YOUR WORK, TAKE CARE OF THINGS AT HOME, OR GET ALONG WITH OTHER PEOPLE: SOMEWHAT DIFFICULT
GAD7 TOTAL SCORE: 5
3. WORRYING TOO MUCH ABOUT DIFFERENT THINGS: SEVERAL DAYS
2. NOT BEING ABLE TO STOP OR CONTROL WORRYING: SEVERAL DAYS

## 2022-08-16 ASSESSMENT — PATIENT HEALTH QUESTIONNAIRE - PHQ9
5. POOR APPETITE OR OVEREATING: SEVERAL DAYS
SUM OF ALL RESPONSES TO PHQ QUESTIONS 1-9: 6

## 2022-08-16 NOTE — PROGRESS NOTES
"Female Preventive Health Visit    SUBJECTIVE:    This 57 year old female presents for a routine preventive physical exam.    The patient has the following concerns:     1. Alcohol dependence : currently active after recent relapse. Planning on admission to rehab 8/22 ([facility] This will be 3rd in patient rehab. Active with AA. Has a great sponsor. Rodolfo is supportive. Last drink 8/9/2022.    2. Hypertension : has been out of antihypertensive medication for a few weeks.  3. BMI 46 : S/p bariatric surgery (Emmy en Y) in 2014 at [facility]. Not following with a bariatric clinic and has not in some time. 100 lbs lower than pre surgery weight.   4. JASON and MDD : [facility] Psychiatrist there prescribes trazodone, Effexor XR.    Patient's medications, allergies, past medical, surgical and family histories were reviewed and updated as appropriate.    OB/Gyn History:    Last Pap Smear: 2015 :  overdue    Health Maintenance:  Health maintenance alerts were reviewed and updated as appropriate.  Breast cancer screening: overdue  Colorectal cancer screening: due    OBJECTIVE:  Vitals:    08/16/22 1306   BP: (!) 148/98   Pulse: 86   Resp: 16   Temp: 97.1  F (36.2  C)   SpO2: 97%   Weight: 115.1 kg (253 lb 12.8 oz)   Height: 1.575 m (5' 2\")    Body mass index is 46.42 kg/m .  General: no acute distress, cooperative with exam.  HEENT:  PERRLA. Bilateral TM's, external canals, oropharynx normal.    Neck:  Supple, no lymphadenopathy or thyromegaly   Breasts: breasts appear normal, no suspicious masses, no skin or nipple changes  Lungs: clear to auscultation bilaterally, normal respiratory effort.  Heart:  RRR without murmurs, rubs or gallops.  Normal S1 and S2.  Abdomen: normal bowel sounds, nontender, no palpable organomegaly.  Genitourinary: normal external genitalia, vulva, vagina.   Skin:  No lesions.  No rashes.  Extremities: warm, perfused, no swelling or " "edema.  Neuro:  CN II-XII intact, motor & sensory function all intact.    Psych: mental status normal, mood and affect appropriate.    Hearing Screening:  Right Ear  4000Hz: pass  2000Hz: pass  1000Hz: pass  500Hz: pass    Left Ear  4000Hz: pass  2000Hz: pass  1000Hz: pass  500Hz: pass    PHQ 3/17/2020 4/22/2021 8/16/2022   PHQ-9 Total Score 9 0 6   Q9: Thoughts of better off dead/self-harm past 2 weeks Not at all Not at all Not at all     JASON-7 SCORE 3/17/2020 4/22/2021 8/16/2022   Total Score 5 0 5       ASSESSMENT / PLAN:  This 57 year old female presents for a routine preventive physical exam. Preventive health topics discussed including adequate exercise and healthy diet. Return to clinic in one year for preventative exam or sooner with any other concerns.  Other issues discussed as noted below.    Annual physical exam  -     Hemoglobin A1C ([facility])  -     Lipid Panel ([facility])    Essential hypertension  Re start ACEi. Will return for blood pressure recheck after discharged from treatment program (september time).   -     Renal Function Panel ([facility])  -     Microalbumin ([facility])  -     lisinopril (ZESTRIL) 20 MG tablet; Take 1 tablet (20 mg) by mouth daily    Alcohol dependence, continuous (H)  JSAON  Recurrent major depressive disorder, in partial remission (H)  [facility] Psychiatrist there prescribes trazodone, Effexor XR. Planning on admission to rehab 8/22 ([facility]).    Encounter for screening for COVID-19  Needs COVID 19 screen for inpatient treatment admission.   -     Asymptomatic COVID-19 Virus (Coronavirus) by PCR; Future    Encounter for screening for HIV  -     HIV 1/0/2 Rflx ([facility])    Need for hepatitis C screening test  -     HCV Antibody ([facility])    Breast cancer screening by mammogram  -     *MA Screening Digital Bilateral; Future    Cervical cancer screening  -     Pap IG HPV HR and LR ([facility])    Screening for colorectal cancer  -     " Colonscopy Screening  Referral; Future    S/P bariatric surgery  BMI 45.0-49.9, adult (H)  Morbid obesity (H)  S/p bariatric surgery (Emmy en Y) in 2014 at [facility]. Discussed importance of ongoing post surgery bariatric care. Voices she will re establish with the [facility] bariatric clinic.

## 2022-08-16 NOTE — PATIENT INSTRUCTIONS
-  re establish with your bariatric clinic  - I have placed an order COVID 19 testing.      How to schedule:  Go to your [facility] home page and scroll down to the section that says  You have an appointment that needs to be scheduled  and click the large green button that says  Schedule Now  and follow the steps to find the next available opening.      If you are unable to complete these [facility] scheduling steps, please call [phone] to schedule your testing.      - violeta back in for a blood pressure recheck appointment in September time.

## 2022-08-17 LAB
ALBUMIN SERPL-MCNC: 4.1 G/DL (ref 3.8–4.9)
BUN SERPL-MCNC: 7 MG/DL (ref 6–24)
BUN/CREATININE RATIO: 10 (ref 9–23)
CALCIUM SERPL-MCNC: 8.8 MG/DL (ref 8.7–10.2)
CHLORIDE SERPLBLD-SCNC: 98 MMOL/L (ref 96–106)
CHOLEST SERPL-MCNC: 200 MG/DL (ref 100–199)
CREAT SERPL-MCNC: 0.71 MG/DL (ref 0.57–1)
EGFR: 98 ML/MIN/1.73
GLUCOSE SERPL-MCNC: 106 MG/DL (ref 65–99)
HBA1C MFR BLD: 5.5 % (ref 4.8–5.6)
HCV AB SERPL QL IA: <0.1 S/CO RATIO (ref 0–0.9)
HDLC SERPL-MCNC: 82 MG/DL
HIV SCREEN 4TH GEN WITH RFLX: NON REACTIVE
LDL/HDL RATIO: 1.2 RATIO (ref 0–3.2)
LDLC SERPL CALC-MCNC: 95 MG/DL (ref 0–99)
PHOSPHATE SERPL-MCNC: 3.6 MG/DL (ref 3–4.3)
POTASSIUM SERPL-SCNC: 3.8 MMOL/L (ref 3.5–5.2)
SODIUM SERPL-SCNC: 139 MMOL/L (ref 134–144)
TOTAL CO2: 26 MMOL/L (ref 20–29)
TRIGL SERPL-MCNC: 133 MG/DL (ref 0–149)
VLDLC SERPL CALC-MCNC: 23 MG/DL (ref 5–40)

## 2022-08-18 LAB
A/C RATIO (RMG): ABNORMAL
ALBUMIN- RMG: 80 MG/L (ref 0–10)
INTERPRETATION: ABNORMAL
URINE CREATININE - RMG: 300 MG/DL (ref 0–300)

## 2022-08-19 LAB
.: ABNORMAL
DIAGNOSIS:: ABNORMAL
HPV APTIMA: POSITIVE
Lab: ABNORMAL
Lab: ABNORMAL
PATHOLOGIST PROVIDED ICD10: ABNORMAL
PERFORMED BY:: ABNORMAL
SOURCE: ABNORMAL
SPECIMEN ADEQUACY:: ABNORMAL
TEST METHODOLOGY:: ABNORMAL

## 2022-10-09 ENCOUNTER — HEALTH MAINTENANCE LETTER (OUTPATIENT)
Age: 57
End: 2022-10-09

## 2022-10-20 DIAGNOSIS — F43.21 ADJUSTMENT DISORDER WITH DEPRESSED MOOD: ICD-10-CM

## 2022-10-20 DIAGNOSIS — F33.42 RECURRENT MAJOR DEPRESSIVE DISORDER, IN FULL REMISSION (H): ICD-10-CM

## 2022-10-20 DIAGNOSIS — F41.1 GAD (GENERALIZED ANXIETY DISORDER): ICD-10-CM

## 2022-10-20 RX ORDER — VENLAFAXINE HYDROCHLORIDE 75 MG/1
CAPSULE, EXTENDED RELEASE ORAL
Qty: 90 CAPSULE | Refills: 1 | Status: SHIPPED | OUTPATIENT
Start: 2022-10-20 | End: 2023-08-11

## 2022-10-20 RX ORDER — TRAZODONE HYDROCHLORIDE 50 MG/1
TABLET, FILM COATED ORAL
Qty: 90 TABLET | Refills: 1 | Status: SHIPPED | OUTPATIENT
Start: 2022-10-20 | End: 2024-06-11

## 2022-10-20 NOTE — TELEPHONE ENCOUNTER
Trazodone and Venlafaxine  LOV 8/16/22  PHQ 3/17/2020 4/22/2021 8/16/2022   PHQ-9 Total Score 9 0 6   Q9: Thoughts of better off dead/self-harm past 2 weeks Not at all Not at all Not at all     JASON-7 SCORE 3/17/2020 4/22/2021 8/16/2022   Total Score 5 0 5

## 2023-04-14 DIAGNOSIS — F33.42 RECURRENT MAJOR DEPRESSIVE DISORDER, IN FULL REMISSION (H): ICD-10-CM

## 2023-04-14 DIAGNOSIS — F41.1 GAD (GENERALIZED ANXIETY DISORDER): ICD-10-CM

## 2023-04-14 RX ORDER — VENLAFAXINE HYDROCHLORIDE 75 MG/1
75 CAPSULE, EXTENDED RELEASE ORAL DAILY
Qty: 90 CAPSULE | Refills: 0 | OUTPATIENT
Start: 2023-04-14

## 2023-04-14 NOTE — TELEPHONE ENCOUNTER
"Venlafaxine HCL 75 mg    LOV 8/16/22  Last labs 8/16/22      3/17/2020     5:35 PM 4/22/2021     8:37 AM 8/16/2022     1:33 PM   PHQ   PHQ-9 Total Score 9 0 6   Q9: Thoughts of better off dead/self-harm past 2 weeks Not at all Not at all Not at all     No future appt- reminder given    Last notes  4. JASON and MDD : [facility] Psychiatrist there prescribes trazodone, Effexor XR.  Alcohol dependence, continuous (H)  JASON  Recurrent major depressive disorder, in partial remission (H)  [facility] Psychiatrist there prescribes trazodone, Effexor XR. Planning on admission to rehab 8/22 ([facility]).     "

## 2023-06-22 ENCOUNTER — OFFICE VISIT (OUTPATIENT)
Dept: FAMILY MEDICINE | Facility: CLINIC | Age: 58
End: 2023-06-22

## 2023-06-22 VITALS
OXYGEN SATURATION: 97 % | DIASTOLIC BLOOD PRESSURE: 99 MMHG | WEIGHT: 270 LBS | SYSTOLIC BLOOD PRESSURE: 145 MMHG | BODY MASS INDEX: 49.38 KG/M2 | HEART RATE: 77 BPM

## 2023-06-22 DIAGNOSIS — F33.41 RECURRENT MAJOR DEPRESSIVE DISORDER, IN PARTIAL REMISSION (H): ICD-10-CM

## 2023-06-22 DIAGNOSIS — R79.89 LOW TSH LEVEL: ICD-10-CM

## 2023-06-22 DIAGNOSIS — F41.1 GAD (GENERALIZED ANXIETY DISORDER): ICD-10-CM

## 2023-06-22 DIAGNOSIS — I10 BENIGN ESSENTIAL HYPERTENSION: ICD-10-CM

## 2023-06-22 DIAGNOSIS — E66.01 MORBID OBESITY (H): ICD-10-CM

## 2023-06-22 DIAGNOSIS — Z86.79 H/O SINUS TACHYCARDIA: Primary | ICD-10-CM

## 2023-06-22 DIAGNOSIS — F10.20 ALCOHOL DEPENDENCE, CONTINUOUS (H): ICD-10-CM

## 2023-06-22 DIAGNOSIS — Z98.84 HISTORY OF ROUX-EN-Y GASTRIC BYPASS: ICD-10-CM

## 2023-06-22 PROCEDURE — 36415 COLL VENOUS BLD VENIPUNCTURE: CPT | Performed by: FAMILY MEDICINE

## 2023-06-22 PROCEDURE — 99214 OFFICE O/P EST MOD 30 MIN: CPT | Performed by: FAMILY MEDICINE

## 2023-06-22 RX ORDER — VENLAFAXINE HYDROCHLORIDE 37.5 MG/1
CAPSULE, EXTENDED RELEASE ORAL
COMMUNITY
Start: 2023-05-27 | End: 2023-06-22

## 2023-06-22 RX ORDER — METOPROLOL SUCCINATE 25 MG/1
1 TABLET, EXTENDED RELEASE ORAL DAILY
COMMUNITY
Start: 2023-06-06 | End: 2023-08-04

## 2023-06-22 NOTE — PROGRESS NOTES
SUBJECTIVE:    Salina Sigala, is a 57 year old female presenting for the below:     1. Alcohol dependence : recent relapse (drank vodka in the magnitude of liters over 2 to 3 days prior to self presentation to ER via EMS) . Presented to ER 6/6/2023 for detox support. Feeling good in early sobriety. Continues to follow with psychiatry (venlafaxine : cross titrating to fluoxetine, trazodone)  and psychology. In 12 step program. Has sponsor. Denies self harm of suicidal ideation.     2. Noted to have persistent tachycardic during ER presentation (which improved with fluids and beta blockade). Discharged on metoprolol XL 25 mg daily and advised to follow up with primary care for consideration of hyperthyroidism work up : TSH suppressed at 0.16 in ER with T4 within normal limits.    3. BMI 49 : S/p Emmy en Y gastric bypass surgery 2014 with resultant weight regain. Relates onset of alcohol dependence to having bariatric surgery. No longer following with bariatric clinic.     OBJECTIVE:  Vitals:    06/22/23 1546 06/22/23 1617   BP: (!) 144/96 (!) 145/99   Pulse: 77    SpO2: 97%    Weight: 122.5 kg (270 lb)     Body mass index is 49.38 kg/m .  General: no acute distress, cooperative with exam.  Eyes: no injection or drainage.  Throat: moist mucous membranes, no tonsillar exudate or hypertrophy, posterior oral pharynx non-erythematous without lesions.  Neck: supple, no thyroid nodules or enlargement.  Lungs: clear to auscultation bilaterally, normal respiratory effort.  Heart: regular rate, normal S1 and S2, no murmurs.   Abdomen: normal bowel sounds, nontender, no palpable organomegaly.  Extremities: warm, perfused, no swelling or edema.  Neuro: reflexes upper extremities 2+ symmetric. No fine tremor.     ASSESSMENT / PLAN:      H/O sinus tachycardia  Low TSH level  Sinus rhythm on exam today with normal rate. Will recheck TSH. If continues to be suppressed will consider referral to endocrinology. Continue metoprolol  for now.  -     TSH ([facility])  -     VENOUS COLLECTION    Alcohol dependence, continuous (H)  JASON (generalized anxiety disorder)  Recurrent major depressive disorder, in partial remission (H)  Recent relapse   Feeling good / confident in early sobriety.   Continues to follow with psychiatry and psychology.  In 12 step program. Has sponsor.    Benign essential hypertension  Elevated above target. Recheck at first medical weight management appointment.   Lisinopril 20 mg daily.     Morbid obesity (H)  BMI 45.0-49.9, adult (H)  History of Emmy-en-Y gastric bypass 2014  Alcohol dependence emerged after bariatric surgery  Discussed importance of re establishing with bariatric clinic for multidisciplinary approach : patient preference is to start medical weight management within primary care for now.  -Will return with this for dedicated medical weight management first appointment.  -plan to perform baseline body composition analysis at initial appointment.   -plan for post bariatric surgery annual labs at [facility] appointment.

## 2023-06-23 LAB — TSH BLD-ACNC: 0.7 UIU/ML (ref 0.45–4.5)

## 2023-08-04 ENCOUNTER — TELEPHONE (OUTPATIENT)
Dept: FAMILY MEDICINE | Facility: CLINIC | Age: 58
End: 2023-08-04

## 2023-08-04 DIAGNOSIS — Z86.79 H/O SINUS TACHYCARDIA: Primary | ICD-10-CM

## 2023-08-04 RX ORDER — METOPROLOL SUCCINATE 25 MG/1
25 TABLET, EXTENDED RELEASE ORAL DAILY
Qty: 30 TABLET | Refills: 0 | Status: SHIPPED | OUTPATIENT
Start: 2023-08-04 | End: 2023-08-11

## 2023-08-04 NOTE — TELEPHONE ENCOUNTER
Patient called clinic requesting refill of metoprolol 25mg. She was started on this in ER 6/6/23 due to heart palpitations. OK for refill per Dr Ramos, prescription sent to pharmacy. Patient is schedule to see Dr Ramos 8/11 for office visit. Sanaz Coughlin

## 2023-08-11 ENCOUNTER — OFFICE VISIT (OUTPATIENT)
Dept: FAMILY MEDICINE | Facility: CLINIC | Age: 58
End: 2023-08-11

## 2023-08-11 VITALS
HEART RATE: 79 BPM | BODY MASS INDEX: 49.31 KG/M2 | OXYGEN SATURATION: 97 % | WEIGHT: 269.6 LBS | SYSTOLIC BLOOD PRESSURE: 151 MMHG | DIASTOLIC BLOOD PRESSURE: 93 MMHG

## 2023-08-11 DIAGNOSIS — E66.01 MORBID OBESITY (H): ICD-10-CM

## 2023-08-11 DIAGNOSIS — F10.20 ALCOHOL DEPENDENCE, CONTINUOUS (H): Primary | ICD-10-CM

## 2023-08-11 DIAGNOSIS — I10 ESSENTIAL HYPERTENSION: ICD-10-CM

## 2023-08-11 DIAGNOSIS — Z98.84 HISTORY OF ROUX-EN-Y GASTRIC BYPASS: ICD-10-CM

## 2023-08-11 DIAGNOSIS — F41.1 GAD (GENERALIZED ANXIETY DISORDER): ICD-10-CM

## 2023-08-11 DIAGNOSIS — Z86.79 H/O SINUS TACHYCARDIA: ICD-10-CM

## 2023-08-11 DIAGNOSIS — F33.41 RECURRENT MAJOR DEPRESSIVE DISORDER, IN PARTIAL REMISSION (H): ICD-10-CM

## 2023-08-11 LAB
ALBUMIN SERPL BCG-MCNC: 3.9 G/DL (ref 3.5–5.2)
ALP SERPL-CCNC: 109 U/L (ref 35–104)
ALT SERPL W P-5'-P-CCNC: 24 U/L (ref 0–50)
ANION GAP SERPL CALCULATED.3IONS-SCNC: 10 MMOL/L (ref 7–15)
AST SERPL W P-5'-P-CCNC: 24 U/L (ref 0–45)
BILIRUB DIRECT SERPL-MCNC: <0.2 MG/DL (ref 0–0.3)
BILIRUB SERPL-MCNC: 0.2 MG/DL
BUN SERPL-MCNC: 8.9 MG/DL (ref 8–23)
CALCIUM SERPL-MCNC: 8.5 MG/DL (ref 8.6–10)
CHLORIDE SERPL-SCNC: 107 MMOL/L (ref 98–107)
CREAT SERPL-MCNC: 0.56 MG/DL (ref 0.51–0.95)
DEPRECATED HCO3 PLAS-SCNC: 24 MMOL/L (ref 22–29)
FERRITIN SERPL-MCNC: 62 NG/ML (ref 11–328)
FOLATE SERPL-MCNC: 9.7 NG/ML (ref 4.6–34.8)
GFR SERPL CREATININE-BSD FRML MDRD: >90 ML/MIN/1.73M2
GLUCOSE SERPL-MCNC: 106 MG/DL (ref 70–99)
HBA1C MFR BLD: 5.5 %
IRON BINDING CAPACITY (ROCHE): 306 UG/DL (ref 240–430)
IRON SATN MFR SERPL: 19 % (ref 15–46)
IRON SERPL-MCNC: 59 UG/DL (ref 37–145)
POTASSIUM SERPL-SCNC: 4.3 MMOL/L (ref 3.4–5.3)
PROT SERPL-MCNC: 6.7 G/DL (ref 6.4–8.3)
SODIUM SERPL-SCNC: 141 MMOL/L (ref 136–145)
VIT B12 SERPL-MCNC: 251 PG/ML (ref 232–1245)

## 2023-08-11 PROCEDURE — 82746 ASSAY OF FOLIC ACID SERUM: CPT | Performed by: FAMILY MEDICINE

## 2023-08-11 PROCEDURE — 99215 OFFICE O/P EST HI 40 MIN: CPT | Performed by: FAMILY MEDICINE

## 2023-08-11 PROCEDURE — 36415 COLL VENOUS BLD VENIPUNCTURE: CPT | Performed by: FAMILY MEDICINE

## 2023-08-11 PROCEDURE — 83550 IRON BINDING TEST: CPT | Mod: ORL | Performed by: FAMILY MEDICINE

## 2023-08-11 PROCEDURE — 83036 HEMOGLOBIN GLYCOSYLATED A1C: CPT | Mod: ORL | Performed by: FAMILY MEDICINE

## 2023-08-11 PROCEDURE — 82248 BILIRUBIN DIRECT: CPT | Mod: ORL | Performed by: FAMILY MEDICINE

## 2023-08-11 PROCEDURE — 80053 COMPREHEN METABOLIC PANEL: CPT | Mod: ORL | Performed by: FAMILY MEDICINE

## 2023-08-11 PROCEDURE — 82607 VITAMIN B-12: CPT | Performed by: FAMILY MEDICINE

## 2023-08-11 PROCEDURE — 82728 ASSAY OF FERRITIN: CPT | Performed by: FAMILY MEDICINE

## 2023-08-11 RX ORDER — METOPROLOL SUCCINATE 25 MG/1
25 TABLET, EXTENDED RELEASE ORAL DAILY
Qty: 90 TABLET | Refills: 0 | Status: SHIPPED | OUTPATIENT
Start: 2023-08-11 | End: 2023-12-04

## 2023-08-11 ASSESSMENT — ANXIETY QUESTIONNAIRES
5. BEING SO RESTLESS THAT IT IS HARD TO SIT STILL: NOT AT ALL
3. WORRYING TOO MUCH ABOUT DIFFERENT THINGS: NOT AT ALL
7. FEELING AFRAID AS IF SOMETHING AWFUL MIGHT HAPPEN: NOT AT ALL
GAD7 TOTAL SCORE: 0
GAD7 TOTAL SCORE: 0
6. BECOMING EASILY ANNOYED OR IRRITABLE: NOT AT ALL
IF YOU CHECKED OFF ANY PROBLEMS ON THIS QUESTIONNAIRE, HOW DIFFICULT HAVE THESE PROBLEMS MADE IT FOR YOU TO DO YOUR WORK, TAKE CARE OF THINGS AT HOME, OR GET ALONG WITH OTHER PEOPLE: NOT DIFFICULT AT ALL
1. FEELING NERVOUS, ANXIOUS, OR ON EDGE: NOT AT ALL
2. NOT BEING ABLE TO STOP OR CONTROL WORRYING: NOT AT ALL

## 2023-08-11 ASSESSMENT — PATIENT HEALTH QUESTIONNAIRE - PHQ9
5. POOR APPETITE OR OVEREATING: NOT AT ALL
SUM OF ALL RESPONSES TO PHQ QUESTIONS 1-9: 2
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
## 2023-08-11 NOTE — PATIENT INSTRUCTIONS
"\"All the emotional eating is just to fill something\".    There is a weight room at your complex : this may be a good option in the winter.     You have enjoys walking    YOUR physical activity PRESCRIPTION  Frequency: 2 times per week  Intensity: slightly out of breath  Time: 20 mins  Type: walking  Enjoyment: location / nature / enjoyable stop points.     \"Its all about moving for the fun of it!\"    We should all move every day  Some movement is better than none.     You can do hard things!                    .  "

## 2023-08-24 ENCOUNTER — TELEPHONE (OUTPATIENT)
Dept: FAMILY MEDICINE | Facility: CLINIC | Age: 58
End: 2023-08-24

## 2023-08-24 NOTE — TELEPHONE ENCOUNTER
Called [facility] at [phone] for Wegovy prior authorization initiation.   Prior authorization denied due to plan excludes weight loss drugs.   Patient informed.   Linda Larsen RN

## 2023-10-28 ENCOUNTER — HEALTH MAINTENANCE LETTER (OUTPATIENT)
Age: 58
End: 2023-10-28

## 2023-11-14 ENCOUNTER — OFFICE VISIT (OUTPATIENT)
Dept: FAMILY MEDICINE | Facility: CLINIC | Age: 58
End: 2023-11-14

## 2023-11-14 VITALS
DIASTOLIC BLOOD PRESSURE: 108 MMHG | WEIGHT: 264.2 LBS | OXYGEN SATURATION: 98 % | HEART RATE: 74 BPM | HEIGHT: 63 IN | SYSTOLIC BLOOD PRESSURE: 147 MMHG | BODY MASS INDEX: 46.81 KG/M2

## 2023-11-14 DIAGNOSIS — E66.01 MORBID OBESITY (H): Primary | ICD-10-CM

## 2023-11-14 DIAGNOSIS — I10 BENIGN ESSENTIAL HYPERTENSION: ICD-10-CM

## 2023-11-14 DIAGNOSIS — F33.41 RECURRENT MAJOR DEPRESSIVE DISORDER, IN PARTIAL REMISSION (H): ICD-10-CM

## 2023-11-14 DIAGNOSIS — F10.20 ALCOHOL DEPENDENCE, CONTINUOUS (H): ICD-10-CM

## 2023-11-14 DIAGNOSIS — F41.1 GAD (GENERALIZED ANXIETY DISORDER): ICD-10-CM

## 2023-11-14 DIAGNOSIS — I10 ESSENTIAL HYPERTENSION: ICD-10-CM

## 2023-11-14 DIAGNOSIS — Z98.84 HISTORY OF ROUX-EN-Y GASTRIC BYPASS: ICD-10-CM

## 2023-11-14 PROCEDURE — 99214 OFFICE O/P EST MOD 30 MIN: CPT | Mod: 25 | Performed by: FAMILY MEDICINE

## 2023-11-14 PROCEDURE — 0358T BIA WHOLE BODY: CPT | Performed by: FAMILY MEDICINE

## 2023-11-14 RX ORDER — LISINOPRIL AND HYDROCHLOROTHIAZIDE 20; 25 MG/1; MG/1
1 TABLET ORAL DAILY
Qty: 90 TABLET | Refills: 3 | Status: SHIPPED | OUTPATIENT
Start: 2023-11-14

## 2023-11-14 NOTE — PATIENT INSTRUCTIONS
Protein and weight loss    S/p bariatric surgery : 60 - 120 grams / day    Aim for :  80 -100 grams of protein per day  20-30 grams per meal  5-10 grams for a snack      Protein is the macronutrient that suppresses appetite the most.                 Muscle is directly effected by protein intake.  Muscle: biggest metabolically active tissue in the body.   Muscle: is the organ of longevity   There is no such thing as a healthy, sedentary individual  Insulin resistance can occur way before we see metabolic dysfunction.
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
## 2023-12-04 DIAGNOSIS — Z86.79 H/O SINUS TACHYCARDIA: ICD-10-CM

## 2023-12-05 RX ORDER — METOPROLOL SUCCINATE 25 MG/1
25 TABLET, EXTENDED RELEASE ORAL DAILY
Qty: 90 TABLET | Refills: 0 | Status: SHIPPED | OUTPATIENT
Start: 2023-12-05 | End: 2024-05-15

## 2023-12-05 NOTE — TELEPHONE ENCOUNTER
Med: Metoprolol       LOV (related): 11/14/23    Last Lab: 8/11/23      Due for F/U around: 2 weeks    Next Appt: 12/28/23 with Rachel           BP Readings from Last 3 Encounters:   11/14/23 (!) 147/108   08/11/23 (!) 151/93   06/22/23 (!) 145/99       Last Comprehensive Metabolic Panel:  Lab Results   Component Value Date     08/11/2023    POTASSIUM 4.3 08/11/2023    CHLORIDE 107 08/11/2023    CO2 24 08/11/2023    ANIONGAP 10 08/11/2023     (H) 08/11/2023    BUN 8.9 08/11/2023    CR 0.56 08/11/2023    GFRESTIMATED >90 08/11/2023    ROSAURA 8.5 (L) 08/11/2023

## 2024-04-09 ENCOUNTER — OFFICE VISIT (OUTPATIENT)
Dept: FAMILY MEDICINE | Facility: CLINIC | Age: 59
End: 2024-04-09

## 2024-04-09 VITALS
WEIGHT: 272 LBS | HEIGHT: 63 IN | HEART RATE: 88 BPM | OXYGEN SATURATION: 97 % | TEMPERATURE: 98.6 F | SYSTOLIC BLOOD PRESSURE: 131 MMHG | BODY MASS INDEX: 48.2 KG/M2 | DIASTOLIC BLOOD PRESSURE: 83 MMHG

## 2024-04-09 DIAGNOSIS — R39.9 LOWER URINARY TRACT SYMPTOMS (LUTS): ICD-10-CM

## 2024-04-09 DIAGNOSIS — T36.95XA ANTIBIOTIC-INDUCED YEAST INFECTION: ICD-10-CM

## 2024-04-09 DIAGNOSIS — N30.00 ACUTE CYSTITIS WITHOUT HEMATURIA: Primary | ICD-10-CM

## 2024-04-09 DIAGNOSIS — B37.9 ANTIBIOTIC-INDUCED YEAST INFECTION: ICD-10-CM

## 2024-04-09 LAB
BACTERIA (RMG): ABNORMAL
BILIRUBIN (RMG): NEGATIVE
BLOOD (RMG): NEGATIVE
CASTS (RMG): ABNORMAL
COLOR UR: YELLOW
CRYSTAL (RMG): ABNORMAL
EPITHELIAL (RMG): ABNORMAL
GLUCOSE (RMG): NEGATIVE
KETONE (RMG): NEGATIVE
LEUKOCYTE (RMG): ABNORMAL
MUCOUS (RMG): ABNORMAL
NITRITE (RMG): NEGATIVE
PH UR STRIP: 5.5 PH (ref 5–7)
PROTEIN (RMG): NEGATIVE
RBC (RMG): ABNORMAL
SP GR UR STRIP: 1.02
UROBILINOGEN (RMG): 0.2
WBC (RMG): ABNORMAL

## 2024-04-09 PROCEDURE — 81003 URINALYSIS AUTO W/O SCOPE: CPT

## 2024-04-09 PROCEDURE — 99213 OFFICE O/P EST LOW 20 MIN: CPT

## 2024-04-09 PROCEDURE — 87086 URINE CULTURE/COLONY COUNT: CPT

## 2024-04-09 RX ORDER — FLUCONAZOLE 150 MG/1
150 TABLET ORAL
Qty: 3 TABLET | Refills: 0 | Status: SHIPPED | OUTPATIENT
Start: 2024-04-09 | End: 2024-04-16

## 2024-04-09 RX ORDER — NITROFURANTOIN 25; 75 MG/1; MG/1
100 CAPSULE ORAL 2 TIMES DAILY
Qty: 10 CAPSULE | Refills: 0 | Status: SHIPPED | OUTPATIENT
Start: 2024-04-09 | End: 2024-04-14

## 2024-04-09 NOTE — PROGRESS NOTES
"  Assessment & Plan     Acute cystitis without hematuria  We discussed that her symptoms and lab results are consistent with an uncomplicated infection of the bladder.  There are no red flags to suggest pyelonephritis.  Rx for Macrobid.  The patient will be contacted if an adjustment in therapy is indicated based on urine culture.  Adequate fluid intake discussed.  Pyridium as needed for analgesia if desired.  Red flags that would indicate more serious infection discussed and understood. Follow up as needed. Patient agreeable to plan.  - Urine Culture  - nitroFURantoin macrocrystal-monohydrate (MACROBID) 100 MG capsule  Dispense: 10 capsule; Refill: 0  - Urine Culture    Lower urinary tract symptoms (LUTS)  - Urinalysis ([facility])    Antibiotic-induced yeast infection  Patient reports history of yeast infections with antibiotic use. Rx for Diflucan. Side effects reviewed. Follow up as needed. Patient agreeable to plan.   - fluconazole (DIFLUCAN) 150 MG tablet  Dispense: 3 tablet; Refill: 0            BMI  Estimated body mass index is 48.96 kg/m  as calculated from the following:    Height as of this encounter: 1.588 m (5' 2.5\").    Weight as of this encounter: 123.4 kg (272 lb).   Weight management plan: Discussed healthy diet and exercise guidelines      Work on weight loss  Regular exercise  See Patient Instructions    Return if symptoms worsen or fail to improve, for Follow up.    Britney Downing is a 58 year old, presenting for the following health issues:  Urinary Problem (Increased urgency without actually urinating most of the time, right side/back ache that seems to be getting worse, some incontinence, pt denies painful urination /Symptoms started about 2 weeks ago/Pt has not used anything over the counter and denies fever)    History of Present Illness       Reason for visit:  UTI?  Symptom onset:  1-2 weeks ago  Symptoms include:  Frequent immediate urge To urinate/in continence; side/back pain  Symptom " "intensity:  Moderate  Symptom progression:  Worsening  Had these symptoms before:  No    She eats 2-3 servings of fruits and vegetables daily.She consumes 0 sweetened beverage(s) daily.She exercises with enough effort to increase her heart rate 9 or less minutes per day.  She exercises with enough effort to increase her heart rate 3 or less days per week.   She is taking medications regularly.         Genitourinary - Female  Onset/Duration: 1-2 weeks maybe longer but wasn't bad and now constantly going to the bathroom  Description:   Painful urination (Dysuria): YES- a little           Frequency: YES  Blood in urine (Hematuria): No  Delay in urine (Hesitency): No  Intensity: moderate  Progression of Symptoms:  worsening  Accompanying Signs & Symptoms:  Fever/chills: No  Flank pain: No- right side sometimes but lower back   Nausea and vomiting: No  Vaginal symptoms: itching maybe related to pad and dryness  Abdominal/Pelvic Pain: No  History:   History of frequent UTI s: No  History of kidney stones: No  Sexually Active: No  Precipitating or alleviating factors: None  Therapies tried and outcome: Increase fluid intake and limit caffeine         Review of Systems  Constitutional, HEENT, cardiovascular, pulmonary, gi and gu systems are negative, except as otherwise noted.      Objective    /83   Pulse 88   Temp 98.6  F (37  C)   Ht 1.588 m (5' 2.5\")   Wt 123.4 kg (272 lb)   LMP 10/09/2015 (Approximate)   SpO2 97%   BMI 48.96 kg/m    Body mass index is 48.96 kg/m .  Physical Exam   GENERAL: alert and no distress  RESP: lungs clear to auscultation - no rales, rhonchi or wheezes  CV: regular rate and rhythm, normal S1 S2, no S3 or S4, no murmur, click or rub, no peripheral edema  ABDOMEN: soft, non-tender  BACK: no CVA tenderness, mild right low paralumbar tenderness  PSYCH: mentation appears normal, affect normal/bright    Results for orders placed or performed in visit on 04/09/24 (from the past 24 hour(s)) "   Urinalysis ([facility])   Result Value Ref Range    Color Urine Yellow (A)     pH Urine 5.5 pH    Specific Gravity Urine 1.025     PROTEIN ([facility]) Negative Negative    GLUCOSE ([facility]) Negative Negative    KETONE ([facility]) Negative Negative    LEUKOCYTE ([facility]) Trace (A) Negative    BLOOD ([facility]) Negative Negative    Nitrite ([facility]) Negative Negative, Positive    BILIRUBIN ([facility]) Negative Negative    UROBILINOGEN ([facility]) 0.2 0.2 - 1    WBC ([facility]) 5-10 (A)     RBC ([facility]) Rare     CRYSTAL ([facility]) None     BACTERIA ([facility]) Few (A)     MUCOUS ([facility]) None     CASTS ([facility]) None     EPITHELIAL ([facility]) Few            Signed Electronically by: URSULA Lundy CNP

## 2024-04-11 LAB — BACTERIA UR CULT: NORMAL

## 2024-05-15 ENCOUNTER — OFFICE VISIT (OUTPATIENT)
Dept: FAMILY MEDICINE | Facility: CLINIC | Age: 59
End: 2024-05-15

## 2024-05-15 VITALS
OXYGEN SATURATION: 96 % | SYSTOLIC BLOOD PRESSURE: 139 MMHG | BODY MASS INDEX: 49.39 KG/M2 | DIASTOLIC BLOOD PRESSURE: 103 MMHG | WEIGHT: 274.4 LBS | HEART RATE: 95 BPM

## 2024-05-15 DIAGNOSIS — Z86.79 H/O SINUS TACHYCARDIA: ICD-10-CM

## 2024-05-15 DIAGNOSIS — Z98.84 HISTORY OF ROUX-EN-Y GASTRIC BYPASS: ICD-10-CM

## 2024-05-15 DIAGNOSIS — E66.01 MORBID OBESITY (H): ICD-10-CM

## 2024-05-15 DIAGNOSIS — Z23 NEED FOR TDAP VACCINATION: ICD-10-CM

## 2024-05-15 DIAGNOSIS — I10 ESSENTIAL HYPERTENSION: Primary | ICD-10-CM

## 2024-05-15 PROCEDURE — 90715 TDAP VACCINE 7 YRS/> IM: CPT | Performed by: FAMILY MEDICINE

## 2024-05-15 PROCEDURE — 99214 OFFICE O/P EST MOD 30 MIN: CPT | Mod: 25 | Performed by: FAMILY MEDICINE

## 2024-05-15 PROCEDURE — 90471 IMMUNIZATION ADMIN: CPT | Performed by: FAMILY MEDICINE

## 2024-05-15 RX ORDER — METOPROLOL SUCCINATE 25 MG/1
25 TABLET, EXTENDED RELEASE ORAL DAILY
Qty: 90 TABLET | Refills: 3 | Status: SHIPPED | OUTPATIENT
Start: 2024-05-15

## 2024-05-15 ASSESSMENT — ANXIETY QUESTIONNAIRES
8. IF YOU CHECKED OFF ANY PROBLEMS, HOW DIFFICULT HAVE THESE MADE IT FOR YOU TO DO YOUR WORK, TAKE CARE OF THINGS AT HOME, OR GET ALONG WITH OTHER PEOPLE?: NOT DIFFICULT AT ALL
7. FEELING AFRAID AS IF SOMETHING AWFUL MIGHT HAPPEN: NOT AT ALL
7. FEELING AFRAID AS IF SOMETHING AWFUL MIGHT HAPPEN: NOT AT ALL
GAD7 TOTAL SCORE: 0
1. FEELING NERVOUS, ANXIOUS, OR ON EDGE: NOT AT ALL
3. WORRYING TOO MUCH ABOUT DIFFERENT THINGS: NOT AT ALL
5. BEING SO RESTLESS THAT IT IS HARD TO SIT STILL: NOT AT ALL
4. TROUBLE RELAXING: NOT AT ALL
2. NOT BEING ABLE TO STOP OR CONTROL WORRYING: NOT AT ALL
IF YOU CHECKED OFF ANY PROBLEMS ON THIS QUESTIONNAIRE, HOW DIFFICULT HAVE THESE PROBLEMS MADE IT FOR YOU TO DO YOUR WORK, TAKE CARE OF THINGS AT HOME, OR GET ALONG WITH OTHER PEOPLE: NOT DIFFICULT AT ALL
GAD7 TOTAL SCORE: 0
GAD7 TOTAL SCORE: 0
6. BECOMING EASILY ANNOYED OR IRRITABLE: NOT AT ALL

## 2024-05-15 ASSESSMENT — PATIENT HEALTH QUESTIONNAIRE - PHQ9
SUM OF ALL RESPONSES TO PHQ QUESTIONS 1-9: 2
SUM OF ALL RESPONSES TO PHQ QUESTIONS 1-9: 2
10. IF YOU CHECKED OFF ANY PROBLEMS, HOW DIFFICULT HAVE THESE PROBLEMS MADE IT FOR YOU TO DO YOUR WORK, TAKE CARE OF THINGS AT HOME, OR GET ALONG WITH OTHER PEOPLE: NOT DIFFICULT AT ALL

## 2024-05-15 NOTE — PROGRESS NOTES
PRIMARY CARE WEIGHT MANAGEMENT PROGRESS NOTE    CHIEF COMPLAINT:  Salina Sigala is a 58 year old female who is following up for medical weight management.     S/p Emmy en Y gastric bypass surgery 2014 with resultant weight regain. Relates onset of alcohol dependence to having bariatric surgery related to being in a bad marriage (). No longer following with bariatric clinic.      Alcohol dependence : most recently relapsed March 2024 : laid off related to this. New job which really enjoying. Follows with psychiatry and psychology.     Medications:   New insurance may cover weight loss meds. Interested in Glp 1.     Nutrition:  Aiming for intuitive eating, portion sizes and increased protein.     Breakfast : hard boiled egg, granola, yogurt  Lunch: spaghetti  Dinner: left overs    Behavior:   Working with psychologist. Feels motivated for active weight loss.     Physical Activity:   [place]ives near [facility]. [place].   Working down town : [place] [employer] at lunch.     WEIGHT HISTORY:   Wt Readings from Last 3 Encounters:   05/15/24 124.5 kg (274 lb 6.4 oz)   04/09/24 123.4 kg (272 lb)   11/14/23 119.8 kg (264 lb 3.2 oz)       PHYSICAL EXAM:  VITAL SIGNS:  BP (!) 139/103   Pulse 95   Wt 124.5 kg (274 lb 6.4 oz)   LMP 10/09/2015 (Approximate)   SpO2 96%   BMI 49.39 kg/m      Weight at start of treatment: 269 lb   Weight at last appointment : 264 lb  Weight today: 274 lbs   Weight change since last appointment: +10 lbs lbs  Weight change since start of treatment : +5 lbs      General: no acute distress, cooperative with exam.    Psych: mental status normal, mood and affect appropriate.    BP Readings from Last 6 Encounters:   05/15/24 (!) 139/103   04/09/24 131/83   11/14/23 (!) 147/108   08/11/23 (!) 151/93   06/22/23 (!) 145/99   08/16/22 (!) 148/98       ASSESSMENT/PLAN:  Salina Sigala is a 58 year old female who is following up for medical weight  management.    Morbid obesity (H)  History of Emmy-en-Y gastric bypass 2014  BMI 45.0-49.9, adult (H)  Plan for management includes the following:    -Nutrition: focusing on smaller portions.     -Exercise: discussed seated exercises to start and intentional movement throughout they day.    -medication : new insurance : will try for GLP1. Mechanism of action, common side effects and how to take discussed.    -     Semaglutide-Weight Management (WEGOVY) 0.25 MG/0.5ML pen; Inject 0.25 mg Subcutaneous once a week     Essential hypertension  H/O sinus tachycardia  not at target of <140/90.  lisinopril-hydrochlorothiazide (ZESTORETIC) 20-25 MG tablet,   -metoprolol XL 25 mg daily (out of for last month).  -follow up 1 month recheck once back on BB.  -     metoprolol succinate ER (TOPROL XL) 25 MG 24 hr tablet; Take 1 tablet (25 mg) by mouth daily    Need for Tdap vaccination  -     TDAP 7+ (ADACEL,BOOSTRIX)  -     VACCINE ADMINISTRATION, INITIAL    Follow up : 1 month mwm and blood pressure recheck once back on BB.

## 2024-05-15 NOTE — PATIENT INSTRUCTIONS
Think about ways in incorporate intentional movement throughout your day.     Walking on lunch breaks is an option.     [place] You [place]! Progress not perfection.     ----------------------------------------------    The medication you have been prescribed today will need a prior authorization (PA) before your pharmacy will be able to issue it to you.     Know that our triage team are working on the PA. The turn around time can be up to 1 week on this.     You will be notified of the decision by either our office or your pharmacy. Please only call if you have not heard anything after 7 working days.

## 2024-06-03 ENCOUNTER — TELEPHONE (OUTPATIENT)
Dept: FAMILY MEDICINE | Facility: CLINIC | Age: 59
End: 2024-06-03

## 2024-06-03 DIAGNOSIS — G47.9 SLEEP DIFFICULTIES: Primary | ICD-10-CM

## 2024-06-03 NOTE — TELEPHONE ENCOUNTER
Patient called clinic requesting prescription for trazodone. Patient as been prescribed this through [facility], Renita Cee CNP. [facility] is no longer in patient insurance network and is asking Dr Ramos take over prescribing. Patient last office visit 5/15/24 for MWM, last preventative visit with labs 8/11/2023. Per Epic medication dispense report:    Prescription entered for trazodone 50mg and routed to Dr Ramos for review. Sanaz Coughlin

## 2024-06-03 NOTE — TELEPHONE ENCOUNTER
Prior authorization done via CoverMyMeds for Wegovy. Prior authorization denied. Sanaz Coughlin

## 2024-06-04 RX ORDER — TRAZODONE HYDROCHLORIDE 50 MG/1
50 TABLET, FILM COATED ORAL AT BEDTIME
Qty: 30 TABLET | Refills: 2 | Status: SHIPPED | OUTPATIENT
Start: 2024-06-04 | End: 2024-09-24

## 2024-06-12 ENCOUNTER — OFFICE VISIT (OUTPATIENT)
Dept: FAMILY MEDICINE | Facility: CLINIC | Age: 59
End: 2024-06-12

## 2024-06-12 VITALS
WEIGHT: 273 LBS | BODY MASS INDEX: 49.14 KG/M2 | OXYGEN SATURATION: 96 % | DIASTOLIC BLOOD PRESSURE: 78 MMHG | HEART RATE: 93 BPM | SYSTOLIC BLOOD PRESSURE: 134 MMHG

## 2024-06-12 DIAGNOSIS — Z12.31 BREAST CANCER SCREENING BY MAMMOGRAM: ICD-10-CM

## 2024-06-12 DIAGNOSIS — Z12.11 SCREENING FOR COLORECTAL CANCER: ICD-10-CM

## 2024-06-12 DIAGNOSIS — I10 ESSENTIAL HYPERTENSION: ICD-10-CM

## 2024-06-12 DIAGNOSIS — Z98.84 HISTORY OF ROUX-EN-Y GASTRIC BYPASS: ICD-10-CM

## 2024-06-12 DIAGNOSIS — Z12.12 SCREENING FOR COLORECTAL CANCER: ICD-10-CM

## 2024-06-12 DIAGNOSIS — E66.01 MORBID OBESITY (H): Primary | ICD-10-CM

## 2024-06-12 PROCEDURE — G2211 COMPLEX E/M VISIT ADD ON: HCPCS | Performed by: FAMILY MEDICINE

## 2024-06-12 PROCEDURE — 99214 OFFICE O/P EST MOD 30 MIN: CPT | Performed by: FAMILY MEDICINE

## 2024-06-12 RX ORDER — TOPIRAMATE 50 MG/1
TABLET, FILM COATED ORAL
Qty: 190 TABLET | Refills: 0 | Status: SHIPPED | OUTPATIENT
Start: 2024-06-12 | End: 2024-09-20

## 2024-06-12 NOTE — PROGRESS NOTES
SUBJECTIVE:    Salina Sigala, is a 58 year old female presenting for the below:     1. Hypertension : lisinopril-hydrochlorothiazide (ZESTORETIC) 20-25 MG tablet, metoprolol succinate ER (TOPROL XL) 25 MG 24 hr tablet.     2. BMI 49 : S/p Emmy en Y gastric bypass surgery 2014. Insurance not covering wegovy. Interested in alternatives. Endorses brain chatter about food. Drinks diet sodas.         OBJECTIVE:  Vitals:    06/12/24 1630 06/12/24 1632   BP: (!) 140/78 134/78   Pulse: 93    SpO2: 96%    Weight: 123.8 kg (273 lb)     Body mass index is 49.14 kg/m .  General: no acute distress, cooperative with exam.  Psych: mental status normal, mood and affect appropriate.      ASSESSMENT / PLAN:        Morbid obesity (H)  History of Emmy-en-Y gastric bypass 2014  No PMHx of kidney stones. Mechanism of action, common side effects and how to take discussed. Encouraged to cut out diet sodas (discussed link between consumption of these and excess body weight). Ideally should avoid carbonated beverages s/p bariatric surgery.   -     topiramate (TOPAMAX) 50 MG tablet; Take 0.5 tablets (25 mg) by mouth 2 times daily for 10 days, THEN 1 tablet (50 mg) 2 times daily for 90 days.    Screening for colorectal cancer  -     Colonoscopy Screening  Referral; Future    Breast cancer screening by mammogram  -     MA Screening Bilateral w/ Moise; Future    Essential hypertension  at target of <140/90.  Continue lisinopril-hydrochlorothiazide (ZESTORETIC) 20-25 MG tablet, metoprolol succinate ER (TOPROL XL) 25 MG 24 hr tablet.       Follow up:  2 months annual physical and mwm recheck.

## 2024-09-24 DIAGNOSIS — G47.9 SLEEP DIFFICULTIES: ICD-10-CM

## 2024-09-24 RX ORDER — TRAZODONE HYDROCHLORIDE 50 MG/1
50 TABLET, FILM COATED ORAL AT BEDTIME
Qty: 90 TABLET | Refills: 3 | Status: SHIPPED | OUTPATIENT
Start: 2024-09-24

## 2024-09-24 NOTE — TELEPHONE ENCOUNTER
Med: Trazodone      LOV (related): 8/16/22-cpx      Due for F/U around: due for cpx , Last CPX 8/16/2022      Next Appt: 11/26/24 (cpx) with Rachel

## 2024-11-20 DIAGNOSIS — I10 BENIGN ESSENTIAL HYPERTENSION: ICD-10-CM

## 2024-11-20 RX ORDER — LISINOPRIL AND HYDROCHLOROTHIAZIDE 20; 25 MG/1; MG/1
1 TABLET ORAL DAILY
Qty: 90 TABLET | Refills: 3 | Status: SHIPPED | OUTPATIENT
Start: 2024-11-20

## 2024-11-20 SDOH — HEALTH STABILITY: PHYSICAL HEALTH: ON AVERAGE, HOW MANY DAYS PER WEEK DO YOU ENGAGE IN MODERATE TO STRENUOUS EXERCISE (LIKE A BRISK WALK)?: 0 DAYS

## 2024-11-20 SDOH — HEALTH STABILITY: PHYSICAL HEALTH: ON AVERAGE, HOW MANY MINUTES DO YOU ENGAGE IN EXERCISE AT THIS LEVEL?: 0 MIN

## 2024-11-20 ASSESSMENT — SOCIAL DETERMINANTS OF HEALTH (SDOH): HOW OFTEN DO YOU GET TOGETHER WITH FRIENDS OR RELATIVES?: ONCE A WEEK

## 2024-11-20 NOTE — TELEPHONE ENCOUNTER
Patient is calling and is out of medication for 2 days now.  Has physical set for 11/26/24    Lisinopril/hydrochlorothiazide    Pharmacy:  [facility]--[address]

## 2024-11-25 DIAGNOSIS — Z98.84 HISTORY OF ROUX-EN-Y GASTRIC BYPASS: ICD-10-CM

## 2024-11-25 DIAGNOSIS — E66.01 MORBID OBESITY (H): Primary | ICD-10-CM

## 2024-11-25 NOTE — TELEPHONE ENCOUNTER
Med: Topiramate    LOV (related): 6/12/24      Due for F/U around: 8/12/24    Next Appt: 11/26/24        Wt Readings from Last 2 Encounters:   06/12/24 123.8 kg (273 lb)   05/15/24 124.5 kg (274 lb 6.4 oz)       BMI Readings from Last 2 Encounters:   06/12/24 49.14 kg/m    05/15/24 49.39 kg/m

## 2024-11-26 RX ORDER — TOPIRAMATE 50 MG/1
50 TABLET, FILM COATED ORAL 2 TIMES DAILY
Qty: 180 TABLET | Refills: 0 | Status: SHIPPED | OUTPATIENT
Start: 2024-11-26

## 2025-01-27 RX ORDER — TOPIRAMATE 50 MG/1
50 TABLET, FILM COATED ORAL 2 TIMES DAILY
Qty: 180 TABLET | Refills: 0 | Status: CANCELLED | OUTPATIENT
Start: 2025-01-27

## 2025-01-27 NOTE — PROGRESS NOTES
Preventive Care Visit    Health Care Directive  Patient does not have a Health Care Directive: {ADVANCE_DIRECTIVE_STATUS:194979}   11/20/2024   General Health   How would you rate your overall physical health? Good   Feel stress (tense, anxious, or unable to sleep) Not at all      11/20/2024   Nutrition   Three or more servings of calcium each day? Yes   Diet: Regular (no restrictions)   How many servings of fruit and vegetables per day? (!) 2-3   How many sweetened beverages each day? 0-1      11/20/2024   Exercise   Days per week of moderate/strenous exercise 0 days   Average minutes spent exercising at this level 0 min      11/20/2024   Social Factors   Frequency of gathering with friends or relatives Once a week   Worry food won't last until get money to buy more No   Food not last or not have enough money for food? No   Do you have housing? (Housing is defined as stable permanent housing and does not include staying ouside in a car, in a tent, in an abandoned building, in an overnight shelter, or couch-surfing.) Yes   Are you worried about losing your housing? No   Lack of transportation? No   Unable to get utilities (heat,electricity)? No      11/20/2024   Dental   Dentist two times every year? (!) NO      11/20/2024   TB Screening   Were you born outside of the [place]? No     HEARING FREQUENCY: Pass    Left Ear:   Right Ear:     500 Hz : Pass 500 Hz : Pass   1000 Hz: Pass 1000 Hz: Pass   2000 Hz: Pass 2000 Hz: Pass   4000 Hz: Pass 4000 Hz: Pass     {USE TO PULL IN PHQ RESULTS FOR TODAY:300897}     11/20/2024   Substance Use   Alcohol more than 3/day or more than 7/wk Not Applicable   Do you use any other substances recreationally? No     Social History     Tobacco Use    Smoking status: Never    Smokeless tobacco: Never   Substance Use Topics    Alcohol use: No     Alcohol/week: 0.0 standard drinks of alcohol     Comment: in recovery from alcoholism, attending AA daily and in an outpatient  evening program 3  nights weekly.    Drug use: No     {Mammogram Decision Support (Optional):875807}

## 2025-01-27 NOTE — PATIENT INSTRUCTIONS
Patient Education   Preventive Care Advice   This is general advice given by our system to help you stay healthy. However, your care team may have specific advice just for you. Please talk to your care team about your preventive care needs.  Nutrition  Eat 5 or more servings of fruits and vegetables each day.  Try wheat bread, brown rice and whole grain pasta (instead of white bread, rice, and pasta).  Get enough calcium and vitamin D. Check the label on foods and aim for 100% of the RDA (recommended daily allowance).  Lifestyle  Exercise at least 150 minutes each week  (30 minutes a day, 5 days a week).  Do muscle strengthening activities 2 days a week. These help control your weight and prevent disease.  No smoking.  Wear sunscreen to prevent skin cancer.  Have a dental exam and cleaning every 6 months.  Yearly exams  See your health care team every year to talk about:  Any changes in your health.  Any medicines your care team has prescribed.  Preventive care, family planning, and ways to prevent chronic diseases.  Shots (vaccines)   HPV shots (up to age 26), if you've never had them before.  Hepatitis B shots (up to age 59), if you've never had them before.  COVID-19 shot: Get this shot when it's due.  Flu shot: Get a flu shot every year.  Tetanus shot: Get a tetanus shot every 10 years.  Pneumococcal, hepatitis A, and RSV shots: Ask your care team if you need these based on your risk.  Shingles shot (for age 50 and up)  General health tests  Diabetes screening:  Starting at age 35, Get screened for diabetes at least every 3 years.  If you are younger than age 35, ask your care team if you should be screened for diabetes.  Cholesterol test: At age 39, start having a cholesterol test every 5 years, or more often if advised.  Bone density scan (DEXA): At age 50, ask your care team if you should have this scan for osteoporosis (brittle bones).  Hepatitis C: Get tested at least once in your life.  STIs (sexually  transmitted infections)  Before age 24: Ask your care team if you should be screened for STIs.  After age 24: Get screened for STIs if you're at risk. You are at risk for STIs (including HIV) if:  You are sexually active with more than one person.  You don't use condoms every time.  You or a partner was diagnosed with a sexually transmitted infection.  If you are at risk for HIV, ask about PrEP medicine to prevent HIV.  Get tested for HIV at least once in your life, whether you are at risk for HIV or not.  Cancer screening tests  Cervical cancer screening: If you have a cervix, begin getting regular cervical cancer screening tests starting at age 21.  Breast cancer scan (mammogram): If you've ever had breasts, begin having regular mammograms starting at age 40. This is a scan to check for breast cancer.  Colon cancer screening: It is important to start screening for colon cancer at age 45.  Have a colonoscopy test every 10 years (or more often if you're at risk) Or, ask your provider about stool tests like a FIT test every year or Cologuard test every 3 years.  To learn more about your testing options, visit:   .  For help making a decision, visit:   https://bit.ly/ss81571.  Prostate cancer screening test: If you have a prostate, ask your care team if a prostate cancer screening test (PSA) at age 55 is right for you.  Lung cancer screening: If you are a current or former smoker ages 50 to 80, ask your care team if ongoing lung cancer screenings are right for you.  For informational purposes only. Not to replace the advice of your health care provider. Copyright   2023 [facility]. All rights reserved. Clinically reviewed by the [facility]. [facility] 362862 - REV 01/24.     Have a look at :  [facility]  [address] [address]  Phone: [phone]  Fax: [phone]    To re establish with a bariatric clinic for ongoing weight loss.

## 2025-01-28 ENCOUNTER — OFFICE VISIT (OUTPATIENT)
Dept: FAMILY MEDICINE | Facility: CLINIC | Age: 60
End: 2025-01-28

## 2025-01-28 VITALS
OXYGEN SATURATION: 97 % | BODY MASS INDEX: 49.87 KG/M2 | WEIGHT: 271 LBS | SYSTOLIC BLOOD PRESSURE: 122 MMHG | HEART RATE: 85 BPM | DIASTOLIC BLOOD PRESSURE: 91 MMHG | HEIGHT: 62 IN

## 2025-01-28 DIAGNOSIS — Z98.84 HISTORY OF ROUX-EN-Y GASTRIC BYPASS: ICD-10-CM

## 2025-01-28 DIAGNOSIS — Z12.11 SCREENING FOR COLORECTAL CANCER: ICD-10-CM

## 2025-01-28 DIAGNOSIS — F10.21 ALCOHOL DEPENDENCE IN REMISSION (H): ICD-10-CM

## 2025-01-28 DIAGNOSIS — R63.5 WEIGHT GAIN: ICD-10-CM

## 2025-01-28 DIAGNOSIS — E66.01 MORBID OBESITY (H): ICD-10-CM

## 2025-01-28 DIAGNOSIS — F33.41 RECURRENT MAJOR DEPRESSIVE DISORDER, IN PARTIAL REMISSION: ICD-10-CM

## 2025-01-28 DIAGNOSIS — Z12.31 BREAST CANCER SCREENING BY MAMMOGRAM: ICD-10-CM

## 2025-01-28 DIAGNOSIS — I10 ESSENTIAL HYPERTENSION: ICD-10-CM

## 2025-01-28 DIAGNOSIS — Z12.4 CERVICAL CANCER SCREENING: ICD-10-CM

## 2025-01-28 DIAGNOSIS — Z23 NEEDS FLU SHOT: ICD-10-CM

## 2025-01-28 DIAGNOSIS — F41.1 GAD (GENERALIZED ANXIETY DISORDER): ICD-10-CM

## 2025-01-28 DIAGNOSIS — Z12.12 SCREENING FOR COLORECTAL CANCER: ICD-10-CM

## 2025-01-28 DIAGNOSIS — Z23 NEED FOR COVID-19 VACCINE: ICD-10-CM

## 2025-01-28 DIAGNOSIS — Z00.00 ROUTINE GENERAL MEDICAL EXAMINATION AT A HEALTH CARE FACILITY: Primary | ICD-10-CM

## 2025-01-28 PROBLEM — F10.20 ALCOHOL DEPENDENCE, CONTINUOUS (H): Status: RESOLVED | Noted: 2020-10-16 | Resolved: 2025-01-28

## 2025-01-28 LAB
% GRANULOCYTES: 56.8 % (ref 42.2–75.2)
ALBUMIN SERPL BCG-MCNC: 4 G/DL (ref 3.5–5.2)
ALP SERPL-CCNC: 141 U/L (ref 40–150)
ALT SERPL W P-5'-P-CCNC: 21 U/L (ref 0–50)
ANION GAP SERPL CALCULATED.3IONS-SCNC: 12 MMOL/L (ref 7–15)
AST SERPL W P-5'-P-CCNC: 26 U/L (ref 0–45)
BILIRUB SERPL-MCNC: 0.3 MG/DL
BUN SERPL-MCNC: 22 MG/DL (ref 8–23)
CALCIUM SERPL-MCNC: 8.9 MG/DL (ref 8.8–10.4)
CHLORIDE SERPL-SCNC: 101 MMOL/L (ref 98–107)
CHOLESTEROL: 201 MG/DL (ref 100–199)
CREAT SERPL-MCNC: 0.9 MG/DL (ref 0.51–0.95)
EGFRCR SERPLBLD CKD-EPI 2021: 73 ML/MIN/1.73M2
FASTING STATUS PATIENT QL REPORTED: NO
FASTING?: NO
GLUCOSE SERPL-MCNC: 93 MG/DL (ref 70–99)
HCO3 SERPL-SCNC: 27 MMOL/L (ref 22–29)
HCT VFR BLD AUTO: 39 % (ref 35–46)
HDL (RMG): 90 MG/DL (ref 40–?)
HEMOGLOBIN: 12.9 G/DL (ref 11.8–15.5)
IRON BINDING CAPACITY (ROCHE): 301 UG/DL (ref 240–430)
IRON SATN MFR SERPL: 22 % (ref 15–46)
IRON SERPL-MCNC: 65 UG/DL (ref 37–145)
LDL CALCULATED (RMG): 82 MG/DL (ref 0–130)
LYMPHOCYTES NFR BLD AUTO: 34.4 % (ref 20.5–51.1)
MCH RBC QN AUTO: 30.6 PG (ref 27–31)
MCHC RBC AUTO-ENTMCNC: 33.1 G/DL (ref 33–37)
MCV RBC AUTO: 92.3 FL (ref 80–100)
MONOCYTES NFR BLD AUTO: 8.8 % (ref 1.7–9.3)
PLATELET # BLD AUTO: 261 K/UL (ref 140–450)
POTASSIUM SERPL-SCNC: 3.9 MMOL/L (ref 3.4–5.3)
PROT SERPL-MCNC: 7.1 G/DL (ref 6.4–8.3)
RBC # BLD AUTO: 4.22 X10/CMM (ref 3.7–5.2)
SODIUM SERPL-SCNC: 140 MMOL/L (ref 135–145)
TRIGLYCERIDES (RMG): 143 MG/DL (ref 0–149)
WBC # BLD AUTO: 5.1 X10/CMM (ref 3.8–11)

## 2025-01-28 PROCEDURE — 99396 PREV VISIT EST AGE 40-64: CPT | Mod: 25 | Performed by: FAMILY MEDICINE

## 2025-01-28 PROCEDURE — 85025 COMPLETE CBC W/AUTO DIFF WBC: CPT | Performed by: FAMILY MEDICINE

## 2025-01-28 PROCEDURE — 99214 OFFICE O/P EST MOD 30 MIN: CPT | Mod: 25 | Performed by: FAMILY MEDICINE

## 2025-01-28 PROCEDURE — 82728 ASSAY OF FERRITIN: CPT | Mod: ORL | Performed by: FAMILY MEDICINE

## 2025-01-28 PROCEDURE — 80061 LIPID PANEL: CPT | Mod: QW | Performed by: FAMILY MEDICINE

## 2025-01-28 PROCEDURE — 91320 SARSCV2 VAC 30MCG TRS-SUC IM: CPT | Performed by: FAMILY MEDICINE

## 2025-01-28 PROCEDURE — 82607 VITAMIN B-12: CPT | Mod: ORL | Performed by: FAMILY MEDICINE

## 2025-01-28 PROCEDURE — 83550 IRON BINDING TEST: CPT | Mod: ORL | Performed by: FAMILY MEDICINE

## 2025-01-28 PROCEDURE — 90661 CCIIV3 VAC ABX FR 0.5 ML IM: CPT | Performed by: FAMILY MEDICINE

## 2025-01-28 PROCEDURE — 82040 ASSAY OF SERUM ALBUMIN: CPT | Mod: ORL | Performed by: FAMILY MEDICINE

## 2025-01-28 PROCEDURE — 90471 IMMUNIZATION ADMIN: CPT | Performed by: FAMILY MEDICINE

## 2025-01-28 PROCEDURE — 90480 ADMN SARSCOV2 VAC 1/ONLY CMP: CPT | Performed by: FAMILY MEDICINE

## 2025-01-28 PROCEDURE — 80053 COMPREHEN METABOLIC PANEL: CPT | Mod: ORL | Performed by: FAMILY MEDICINE

## 2025-01-28 PROCEDURE — 87624 HPV HI-RISK TYP POOLED RSLT: CPT | Mod: ORL | Performed by: FAMILY MEDICINE

## 2025-01-28 PROCEDURE — 36415 COLL VENOUS BLD VENIPUNCTURE: CPT | Performed by: FAMILY MEDICINE

## 2025-01-28 PROCEDURE — 82306 VITAMIN D 25 HYDROXY: CPT | Mod: ORL | Performed by: FAMILY MEDICINE

## 2025-01-28 PROCEDURE — G0145 SCR C/V CYTO,THINLAYER,RESCR: HCPCS | Mod: ORL | Performed by: FAMILY MEDICINE

## 2025-01-28 PROCEDURE — 83540 ASSAY OF IRON: CPT | Mod: ORL | Performed by: FAMILY MEDICINE

## 2025-01-28 SDOH — HEALTH STABILITY: PHYSICAL HEALTH: ON AVERAGE, HOW MANY MINUTES DO YOU ENGAGE IN EXERCISE AT THIS LEVEL?: 0 MIN

## 2025-01-28 SDOH — HEALTH STABILITY: PHYSICAL HEALTH: ON AVERAGE, HOW MANY DAYS PER WEEK DO YOU ENGAGE IN MODERATE TO STRENUOUS EXERCISE (LIKE A BRISK WALK)?: 0 DAYS

## 2025-01-28 ASSESSMENT — PATIENT HEALTH QUESTIONNAIRE - PHQ9: SUM OF ALL RESPONSES TO PHQ QUESTIONS 1-9: 3

## 2025-01-28 ASSESSMENT — SOCIAL DETERMINANTS OF HEALTH (SDOH): HOW OFTEN DO YOU GET TOGETHER WITH FRIENDS OR RELATIVES?: ONCE A WEEK

## 2025-01-28 NOTE — PROGRESS NOTES
"Female Preventive Health Visit    SUBJECTIVE:    This 59 year old female presents for a routine preventive physical exam.    The patient has the following concerns:     BMI 49 : weight regain after bariatric surgery and quitting alcohol : \"I've replaced alcohol with food\".  Not following with bariatric clinic.     H/o alcohol dependence : in remission : Last alcohol intake Jan 30th 2024. Attends AA. Has sponsor.    Patient's medications, allergies, past medical, surgical and family histories were reviewed and updated as appropriate.    OB/Gyn History:      Last Pap Smear:  due    Health Maintenance:  Health maintenance alerts were reviewed and updated as appropriate.  Breast cancer screening: due  Colorectal cancer screening: due          1/28/2025   General Health   How would you rate your overall physical health? Good   Feel stress (tense, anxious, or unable to sleep) Only a little   (!) STRESS CONCERN      1/28/2025   Nutrition   Three or more servings of calcium each day? Yes   Diet: Regular (no restrictions)   How many servings of fruit and vegetables per day? (!) 2-3   How many sweetened beverages each day? 0-1         1/28/2025   Exercise   Days per week of moderate/strenous exercise 0 days   Average minutes spent exercising at this level 0 min   (!) EXERCISE CONCERN      1/28/2025   Social Factors   Frequency of gathering with friends or relatives Once a week   Worry food won't last until get money to buy more No   Food not last or not have enough money for food? No   Do you have housing? (Housing is defined as stable permanent housing and does not include staying ouside in a car, in a tent, in an abandoned building, in an overnight shelter, or couch-surfing.) Yes   Are you worried about losing your housing? No   Lack of transportation? No   Unable to get utilities (heat,electricity)? No         1/28/2025   Fall Risk   Fallen 2 or more times in the past year? No   Trouble with walking or balance? No          " "1/28/2025   Dental   Dentist two times every year? (!) NO         11/20/2024   TB Screening   Were you born outside of the [place]? No     Hearing Screening     Left:  500Hz: Pass  1000Hz: Pass  2000Hz: Pass  4000Hz: Pass    Right:  500Hz: Pass  1000Hz: Pass  2000Hz: Pass  4000Hz: Pass           1/28/2025   Substance Use   Alcohol more than 3/day or more than 7/wk Not Applicable   Do you use any other substances recreationally? No           1/28/2025   STI Screening   New sexual partner(s) since last STI/HIV test? No       OBJECTIVE:  Vitals:    01/28/25 1556   BP: (!) 122/91   Pulse: 85   SpO2: 97%   Weight: 122.9 kg (271 lb)   Height: 1.575 m (5' 2\")    Body mass index is 49.57 kg/m .  General: no acute distress, cooperative with exam.  HEENT:  PERRLA. Bilateral TM's, external canals, oropharynx normal.    Neck:  Supple, no lymphadenopathy or thyromegaly   Lungs: clear to auscultation bilaterally, normal respiratory effort.  Heart:  RRR without murmurs, rubs or gallops.  Normal S1 and S2.  Abdomen: normal bowel sounds, nontender, no palpable organomegaly.  Genitourinary: normal external genitalia, vulva, vagina.   Skin:  No lesions.  No rashes.  Extremities: warm, perfused, no swelling or edema.  Neuro:  CN II-XII intact, motor & sensory function all intact.    Psych: mental status normal, mood and affect appropriate.        8/11/2023     4:26 PM 5/15/2024     6:36 AM 1/28/2025     4:05 PM   PHQ   PHQ-9 Total Score 2 2 3   Q9: Thoughts of better off dead/self-harm past 2 weeks Not at all Not at all Not at all       ASSESSMENT / PLAN:  This 59 year old female presents for a routine preventive physical exam. Preventive health topics discussed including adequate exercise and healthy diet. Return to clinic in one year for preventative exam or sooner with any other concerns.  Other issues discussed as noted below.    Routine general medical examination at a health care facility  -     Lipid Profile ([facility])  -     VENOUS " "COLLECTION    Morbid obesity (H)  History of Emmy-en-Y gastric bypass 2014  Weight gain  S/p bariatric surgery (Emmy en Y) in 2014 at [facility].  Takes post briatric vitamins and supplements.   Strongly encouraged to establish with bariatric program for post bariatric care and management of weight regain. Discussed needs multidisciplinary approach. Confounded by h/o alcohol dependence.  Would greatly benefit from bariatric nutritionist, psychologist and bariatrician. Invited to look into [facility] bariatric team / [facility]wm.  -     CBC with Diff/Plt ([facility])  -     Comprehensive metabolic panel; Future  -     Vitamin B12; Future  -     Vitamin D Deficiency; Future  -     Iron & Iron Binding Capacity; Future  -     Ferritin; Future    Cervical cancer screening  -     HPV and Gynecologic Cytology Panel; Future    Need for COVID-19 vaccine  -     VACCINE ADMINISTRATION, INITIAL  -     COVID-19 12+ (PFIZER)    Needs flu shot  -     IMMUNIATION ADMIN EACH ADDT'  -     INFLUENZA TRIVALENT VACCINE (FLUCELVAX)    Screening for colorectal cancer  -     Colonoscopy Screening  Referral; Future    Breast cancer screening by mammogram  -     MA Screen Bilateral w/Moise; Future    Essential hypertension  lisinopril-hydrochlorothiazide (ZESTORETIC) 20-25 MG tablet   Omitted few doses over weekend. Wonders if affecting blood pressure reading today.     Alcohol dependence in remission (H)  Admission to rehab 8/22/24 ([facility]).  3rd in patient rehabs in past. Active with AA. Has a great sponsor. Boss is supportive. Last drink Jan 30th 2024.      JASON (generalized anxiety disorder)  Recurrent major depressive disorder, in partial remission  [facility] Psychiatrist there prescribes trazodone, Effexor XR.      The longitudinal plan of care for the diagnosis(es)/condition(s) as documented were addressed during this visit. Due to the added complexity in care, I will continue to " support Salina in the subsequent management and with ongoing continuity of care.

## 2025-01-29 ENCOUNTER — PATIENT OUTREACH (OUTPATIENT)
Dept: CARE COORDINATION | Facility: CLINIC | Age: 60
End: 2025-01-29
Payer: COMMERCIAL

## 2025-01-29 LAB
FERRITIN SERPL-MCNC: 114 NG/ML (ref 11–328)
VIT B12 SERPL-MCNC: 688 PG/ML (ref 232–1245)
VIT D+METAB SERPL-MCNC: 18 NG/ML (ref 20–50)

## 2025-02-03 LAB
BKR AP ASSOCIATED HPV REPORT: NORMAL
BKR LAB AP GYN ADEQUACY: NORMAL
BKR LAB AP GYN INTERPRETATION: NORMAL
BKR LAB AP PREVIOUS ABNORMAL: NORMAL
PATH REPORT.COMMENTS IMP SPEC: NORMAL
PATH REPORT.COMMENTS IMP SPEC: NORMAL
PATH REPORT.RELEVANT HX SPEC: NORMAL

## 2025-06-05 DIAGNOSIS — Z86.79 H/O SINUS TACHYCARDIA: ICD-10-CM

## 2025-06-05 RX ORDER — METOPROLOL SUCCINATE 25 MG/1
25 TABLET, EXTENDED RELEASE ORAL DAILY
Qty: 90 TABLET | Refills: 3 | Status: SHIPPED | OUTPATIENT
Start: 2025-06-05

## 2025-06-05 NOTE — TELEPHONE ENCOUNTER
Med: metoprolol succinate    LOV (related): 1/28/2025    Last Lab: 1/28/2025      Due for F/U around: 1/2026    Next Appt: none        BP Readings from Last 3 Encounters:   01/28/25 (!) 122/91   06/12/24 134/78   05/15/24 (!) 139/103       Last Comprehensive Metabolic Panel:  Lab Results   Component Value Date     01/28/2025    POTASSIUM 3.9 01/28/2025    CHLORIDE 101 01/28/2025    CO2 27 01/28/2025    ANIONGAP 12 01/28/2025    GLC 93 01/28/2025    BUN 22.0 01/28/2025    CR 0.90 01/28/2025    GFRESTIMATED 73 01/28/2025    ROSAURA 8.9 01/28/2025